# Patient Record
Sex: FEMALE | Race: WHITE | NOT HISPANIC OR LATINO | Employment: FULL TIME | URBAN - NONMETROPOLITAN AREA
[De-identification: names, ages, dates, MRNs, and addresses within clinical notes are randomized per-mention and may not be internally consistent; named-entity substitution may affect disease eponyms.]

---

## 2017-03-06 ENCOUNTER — HOSPITAL ENCOUNTER (EMERGENCY)
Facility: HOSPITAL | Age: 37
Discharge: HOME | End: 2017-03-06
Attending: NURSE PRACTITIONER | Admitting: NURSE PRACTITIONER
Payer: COMMERCIAL

## 2017-03-06 VITALS
DIASTOLIC BLOOD PRESSURE: 105 MMHG | TEMPERATURE: 97 F | HEART RATE: 95 BPM | OXYGEN SATURATION: 98 % | SYSTOLIC BLOOD PRESSURE: 152 MMHG | RESPIRATION RATE: 20 BRPM

## 2017-03-06 DIAGNOSIS — J01.10 ACUTE NON-RECURRENT FRONTAL SINUSITIS: ICD-10-CM

## 2017-03-06 LAB
DEPRECATED S PYO AG THROAT QL EIA: NORMAL
FLUAV+FLUBV AG SPEC QL: NEGATIVE
FLUAV+FLUBV AG SPEC QL: NORMAL
MICRO REPORT STATUS: NORMAL
SPECIMEN SOURCE: NORMAL
SPECIMEN SOURCE: NORMAL

## 2017-03-06 PROCEDURE — 87804 INFLUENZA ASSAY W/OPTIC: CPT | Mod: 59 | Performed by: NURSE PRACTITIONER

## 2017-03-06 PROCEDURE — 87880 STREP A ASSAY W/OPTIC: CPT | Performed by: FAMILY MEDICINE

## 2017-03-06 PROCEDURE — 87081 CULTURE SCREEN ONLY: CPT | Performed by: FAMILY MEDICINE

## 2017-03-06 PROCEDURE — 99213 OFFICE O/P EST LOW 20 MIN: CPT | Performed by: NURSE PRACTITIONER

## 2017-03-06 PROCEDURE — 99213 OFFICE O/P EST LOW 20 MIN: CPT

## 2017-03-06 ASSESSMENT — ENCOUNTER SYMPTOMS
VOMITING: 0
RHINORRHEA: 1
TROUBLE SWALLOWING: 0
PSYCHIATRIC NEGATIVE: 1
WHEEZING: 0
SORE THROAT: 1
APPETITE CHANGE: 0
FEVER: 1
CHILLS: 1
NAUSEA: 0
SHORTNESS OF BREATH: 0
COUGH: 1
ACTIVITY CHANGE: 0
STRIDOR: 0
DYSURIA: 0
HEADACHES: 1
SINUS PRESSURE: 1

## 2017-03-06 NOTE — ED AVS SNAPSHOT
[facility]    [address]    [facility] [address]    Phone:  [phone]                                       Elisha Nunez   MRN: [id number]    Department:  [facility]   Date of Visit:  3/6/2017           Patient Information     Date Of Birth          1980        Your diagnoses for this visit were:     Acute non-recurrent frontal sinusitis        You were seen by Nilsa Rodriguez NP.      Follow-up Information     Follow up with Julia Beltran.    Why:  As needed, If symptoms worsen    Contact information:    [facility]  [address] [place] [address] [phone]          Follow up with [facility].    Specialty:  EMERGENCY MEDICINE    Why:  As needed, If symptoms worsen    Contact information:    [address]  [facility] [address] [phone]    Additional information:    From [facility] ([place], [place]): Take [address]. Turn left at [address]. Turn left at the first stoplight on [address]. At the first stop sign, take a right onto [address]. Take a left into the parking lot and continue through until you reach the North enterance of the building.       From [place]: Take [address]. Take the [address] ramp towards [place]. Turn left onto [address]. Take a slight right onto [address]. Turn left at the first stoplight on [address]. At the first stop sign, take a right onto [address]. Take a left into the parking lot and continue through until you reach the North enterance of the building.       From [place]: Take [address]. Take a right at [address]. At the first stop sign, take a right onto [address]. Take a left into the parking lot and continue through until you reach the North enterance of the building.         Discharge Instructions       Take antibiotics as directed.   Eat a yogurt a day while taking antibiotics.   Take Zyrtec  daily.   Use Flonase 1 spray to each nostril daily for 10 days.   Increase fluid intake.   Sleep with extra pillows under your head or sleep in a recliner until symptoms resolve.   Follow up with PCP with any increase in symptoms or concerns.   Return to urgent care or emergency department with any increase in symptoms or concerns.     Discharge References/Attachments     SINUSITIS (ANTIBIOTIC TREATMENT) (ENGLISH)         Review of your medicines      START taking        Dose / Directions Last dose taken    amoxicillin-clavulanate 875-125 MG per tablet   Commonly known as:  AUGMENTIN   Dose:  1 tablet   Quantity:  20 tablet        Take 1 tablet by mouth 2 times daily for 10 days   Refills:  0          Our records show that you are taking the medicines listed below. If these are incorrect, please call your family doctor or clinic.        Dose / Directions Last dose taken    CITALOPRAM HYDROBROMIDE PO   Dose:  40 mg        Take 40 mg by mouth daily   Refills:  0        etonogestrel-ethinyl estradiol 0.12-0.015 MG/24HR vaginal ring   Commonly known as:  NUVARING   Dose:  1 each        Place 1 each vaginally every 28 days Place for 3 weeks and then remove for one week   Refills:  0        HYDROXYZINE HCL PO   Dose:  25 mg        Take 25 mg by mouth daily as needed for itching   Refills:  0        oxyCODONE-acetaminophen 5-325 MG per tablet   Commonly known as:  PERCOCET   Dose:  1-2 tablet   Quantity:  15 tablet        Take 1-2 tablets by mouth every 4 hours as needed for pain   Refills:  0        TYLENOL PO   Dose:  650 mg        Take 650 mg by mouth every 8 hours as needed for mild pain or fever   Refills:  0                Prescriptions were sent or printed at these locations (1 Prescription)                   [facility] [id number]  [place] - [address] AT [facility] [address] [place] [address]    Telephone:  [phone]   Fax:  [phone]   Hours:                  E-Prescribed  "(1 of 1)         amoxicillin-clavulanate (AUGMENTIN) 875-125 MG per tablet                Procedures and tests performed during your visit     Beta strep group A culture    Influenza A/B antigen    Rapid strep screen      Orders Needing Specimen Collection     None      Pending Results     Date and Time Order Name Status Description    3/6/2017 1153 Beta strep group A culture In process             Pending Culture Results     Date and Time Order Name Status Description    3/6/2017 1153 Beta strep group A culture In process             Thank you for choosing [facility]       Thank you for choosing [facility] for your care. Our goal is always to provide you with excellent care. Hearing back from our patients is one way we can continue to improve our services. Please take a few minutes to complete the written survey that you may receive in the mail after you visit with us. Thank you!        [facility] Information     [employer] lets you send messages to your doctor, view your test results, renew your prescriptions, schedule appointments and more. To sign up, go to [email or web address] . Click on \"Log in\" on the left side of the screen, which will take you to the Welcome page. Then click on \"Sign up Now\" on the right side of the page.     You will be asked to enter the access code listed below, as well as some personal information. Please follow the directions to create your username and password.     Your access code is: [email or web address]  Expires: 2017 12:57 PM     Your access code will  in 90 days. If you need help or a new code, please call your [facility] clinic or [phone].        Care EveryWhere ID     This is your Care EveryWhere ID. This could be used by other organizations to access your [facility] medical records  [id number]        After Visit Summary       This is your record. Keep this with you and show to your community pharmacist(s) and doctor(s) at your next visit.                  "

## 2017-03-06 NOTE — ED PROVIDER NOTES
History     Chief Complaint   Patient presents with     Pharyngitis     today     The history is provided by the patient. No  was used.     Elisha Nunez is a 36 year old female who presents with a headache, sinus pressure, and sore throat for the past 6 days. She has taken [place] cold with mild effectiveness. Positive for fever, chills, and night sweats. Eating and drinking well. Bowel and bladder are working well.     I have reviewed the Medications, Allergies, Past Medical and Surgical History, and Social History in the Epic system.    Review of Systems   Constitutional: Positive for chills and fever. Negative for activity change and appetite change.   HENT: Positive for congestion, postnasal drip, rhinorrhea, sinus pressure and sore throat. Negative for ear discharge, ear pain and trouble swallowing.    Respiratory: Positive for cough. Negative for shortness of breath, wheezing and stridor.    Gastrointestinal: Negative for nausea and vomiting.   Genitourinary: Negative for dysuria.   Skin: Negative for rash.   Neurological: Positive for headaches.        Frontal sinus headache. Headache feels like a throb and pressure.    Psychiatric/Behavioral: Negative.        Physical Exam   BP: (!) 152/105  Pulse: 95  Temp: 97  F (36.1  C)  Resp: 20  SpO2: 98 %  Physical Exam   Constitutional: She is oriented to person, place, and time. She appears well-developed and well-nourished. No distress.   HENT:   Right Ear: External ear normal.   Left Ear: External ear normal.   Mouth/Throat: Oropharynx is clear and moist. No oropharyngeal exudate.   Frontal sinus tenderness and fullness on palpation.    Neck: Normal range of motion. Neck supple.   Cardiovascular: Normal rate, regular rhythm and normal heart sounds.    Pulmonary/Chest: Effort normal. No respiratory distress. She has no wheezes. She has no rales.   Abdominal: Soft. She exhibits no distension.   Musculoskeletal: Normal range of motion.    Lymphadenopathy:     She has no cervical adenopathy.   Neurological: She is alert and oriented to person, place, and time.   Skin: Skin is warm and dry. No rash noted. She is not diaphoretic.   Psychiatric: She has a normal mood and affect. Her behavior is normal.   Nursing note and vitals reviewed.      ED Course     ED Course     Procedures    Labs Ordered and Resulted from Time of ED Arrival Up to the Time of Departure from the ED   RAPID STREP SCREEN   BETA STREP GROUP A CULTURE   INFLUENZA A/B ANTIGEN     Results for orders placed or performed during the hospital encounter of 03/06/17   Rapid strep screen   Result Value Ref Range    Specimen Description Throat     Rapid Strep A Screen       NEGATIVE: No Group A streptococcal antigen detected by immunoassay, await   culture report.      Micro Report Status FINAL 03/06/2017    Influenza A/B antigen   Result Value Ref Range    Influenza A/B Agn Specimen Nares     Influenza A Negative NEG    Influenza B  NEG     Negative   Test results must be correlated with clinical data. If necessary, results   should be confirmed by a molecular assay or viral culture.         Assessments & Plan (with Medical Decision Making)     Discussed plan of care. She verbalized understanding. All questions answered.     I have reviewed the nursing notes.    I have reviewed the findings, diagnosis, plan and need for follow up with the patient.  Discharged in stable condition.     New Prescriptions    AMOXICILLIN-CLAVULANATE (AUGMENTIN) 875-125 MG PER TABLET    Take 1 tablet by mouth 2 times daily for 10 days       Final diagnoses:   Acute non-recurrent frontal sinusitis     Take antibiotics as directed.   Eat a yogurt a day while taking antibiotics.   Take Zyrtec daily.   Use Flonase 1 spray to each nostril daily for 10 days.   Increase fluid intake.   Sleep with extra pillows under your head or sleep in a recliner until symptoms resolve.   Follow up with PCP with any increase in  symptoms or concerns.   Return to urgent care or emergency department with any increase in symptoms or concerns.     PRAVEZ Carney  3/6/2017  12:12 PM  [facility]       Nilsa Rodriguez NP  03/07/17 8839

## 2017-03-06 NOTE — DISCHARGE INSTRUCTIONS
Take antibiotics as directed.   Eat a yogurt a day while taking antibiotics.   Take Zyrtec daily.   Use Flonase 1 spray to each nostril daily for 10 days.   Increase fluid intake.   Sleep with extra pillows under your head or sleep in a recliner until symptoms resolve.   Follow up with PCP with any increase in symptoms or concerns.   Return to urgent care or emergency department with any increase in symptoms or concerns.

## 2017-03-06 NOTE — ED NOTES
Pt presents today alone for c/o sore throat and exposure to influenza, she has not been feeling good since Wednesday or Thursday of last week.

## 2017-03-06 NOTE — ED AVS SNAPSHOT
[facility]    [address] [address]    Phone:  [phone]                                       Elisha Nunez   MRN: [id number]    Department:  [facility]   Date of Visit:  3/6/2017           After Visit Summary Signature Page     I have received my discharge instructions, and my questions have been answered. I have discussed any challenges I see with this plan with the nurse or doctor.    ..........................................................................................................................................  Patient/Patient Representative Signature      ..........................................................................................................................................  Patient Representative Print Name and Relationship to Patient    ..................................................               ................................................  Date                                            Time    ..........................................................................................................................................  Reviewed by Signature/Title    ...................................................              ..............................................  Date                                                            Time

## 2017-03-08 LAB
BACTERIA SPEC CULT: NORMAL
MICRO REPORT STATUS: NORMAL
SPECIMEN SOURCE: NORMAL

## 2017-08-22 ENCOUNTER — TRANSFERRED RECORDS (OUTPATIENT)
Dept: HEALTH INFORMATION MANAGEMENT | Facility: CLINIC | Age: 37
End: 2017-08-22

## 2017-08-24 ENCOUNTER — HOSPITAL ENCOUNTER (OUTPATIENT)
Dept: CT IMAGING | Facility: CLINIC | Age: 37
Discharge: HOME | End: 2017-08-24
Attending: OTOLARYNGOLOGY | Admitting: OTOLARYNGOLOGY
Payer: COMMERCIAL

## 2017-08-24 ENCOUNTER — HOSPITAL ENCOUNTER (OUTPATIENT)
Dept: GENERAL RADIOLOGY | Facility: CLINIC | Age: 37
End: 2017-08-24
Attending: OTOLARYNGOLOGY
Payer: COMMERCIAL

## 2017-08-24 DIAGNOSIS — H91.90 HEARING LOSS, UNSPECIFIED HEARING LOSS TYPE, UNSPECIFIED LATERALITY: ICD-10-CM

## 2017-08-24 DIAGNOSIS — H91.90 HEARING LOSS: ICD-10-CM

## 2017-08-24 PROCEDURE — 70120 X-RAY EXAM OF MASTOIDS: CPT

## 2017-08-24 PROCEDURE — 70480 CT ORBIT/EAR/FOSSA W/O DYE: CPT

## 2017-10-11 ENCOUNTER — ANESTHESIA EVENT (OUTPATIENT)
Dept: SURGERY | Facility: CLINIC | Age: 37
End: 2017-10-11
Payer: COMMERCIAL

## 2017-10-12 ENCOUNTER — ANESTHESIA (OUTPATIENT)
Dept: SURGERY | Facility: CLINIC | Age: 37
End: 2017-10-12
Payer: COMMERCIAL

## 2017-10-12 ENCOUNTER — HOSPITAL ENCOUNTER (OUTPATIENT)
Facility: CLINIC | Age: 37
Discharge: HOME | End: 2017-10-12
Attending: OTOLARYNGOLOGY | Admitting: OTOLARYNGOLOGY
Payer: COMMERCIAL

## 2017-10-12 VITALS
HEART RATE: 105 BPM | TEMPERATURE: 98.1 F | DIASTOLIC BLOOD PRESSURE: 77 MMHG | RESPIRATION RATE: 16 BRPM | BODY MASS INDEX: 50.59 KG/M2 | OXYGEN SATURATION: 95 % | WEIGHT: 285.5 LBS | SYSTOLIC BLOOD PRESSURE: 139 MMHG | HEIGHT: 63 IN

## 2017-10-12 DIAGNOSIS — H66.3X1 CHRONIC SUPPURATIVE OTITIS MEDIA OF RIGHT EAR, UNSPECIFIED OTITIS MEDIA LOCATION: Primary | Chronic | ICD-10-CM

## 2017-10-12 LAB
GLUCOSE BLDC GLUCOMTR-MCNC: 135 MG/DL (ref 70–99)
HCG UR QL: NEGATIVE

## 2017-10-12 PROCEDURE — 71000015 ZZH RECOVERY PHASE 1 LEVEL 2 EA ADDTL HR: Performed by: OTOLARYNGOLOGY

## 2017-10-12 PROCEDURE — 27210794 ZZH OR GENERAL SUPPLY STERILE: Performed by: OTOLARYNGOLOGY

## 2017-10-12 PROCEDURE — 25000128 H RX IP 250 OP 636: Performed by: ANESTHESIOLOGY

## 2017-10-12 PROCEDURE — 36000062 ZZH SURGERY LEVEL 4 1ST 30 MIN - UMMC: Performed by: OTOLARYNGOLOGY

## 2017-10-12 PROCEDURE — 25000132 ZZH RX MED GY IP 250 OP 250 PS 637: Performed by: NURSE ANESTHETIST, CERTIFIED REGISTERED

## 2017-10-12 PROCEDURE — 25000125 ZZHC RX 250: Performed by: NURSE ANESTHETIST, CERTIFIED REGISTERED

## 2017-10-12 PROCEDURE — 25000128 H RX IP 250 OP 636: Performed by: NURSE ANESTHETIST, CERTIFIED REGISTERED

## 2017-10-12 PROCEDURE — 71000014 ZZH RECOVERY PHASE 1 LEVEL 2 FIRST HR: Performed by: OTOLARYNGOLOGY

## 2017-10-12 PROCEDURE — 25000132 ZZH RX MED GY IP 250 OP 250 PS 637: Performed by: OTOLARYNGOLOGY

## 2017-10-12 PROCEDURE — 40000170 ZZH STATISTIC PRE-PROCEDURE ASSESSMENT II: Performed by: OTOLARYNGOLOGY

## 2017-10-12 PROCEDURE — 25000566 ZZH SEVOFLURANE, EA 15 MIN: Performed by: OTOLARYNGOLOGY

## 2017-10-12 PROCEDURE — 25000128 H RX IP 250 OP 636: Performed by: OTOLARYNGOLOGY

## 2017-10-12 PROCEDURE — 82962 GLUCOSE BLOOD TEST: CPT

## 2017-10-12 PROCEDURE — C9399 UNCLASSIFIED DRUGS OR BIOLOG: HCPCS | Performed by: NURSE ANESTHETIST, CERTIFIED REGISTERED

## 2017-10-12 PROCEDURE — 37000009 ZZH ANESTHESIA TECHNICAL FEE, EACH ADDTL 15 MIN: Performed by: OTOLARYNGOLOGY

## 2017-10-12 PROCEDURE — 71000027 ZZH RECOVERY PHASE 2 EACH 15 MINS: Performed by: OTOLARYNGOLOGY

## 2017-10-12 PROCEDURE — 37000008 ZZH ANESTHESIA TECHNICAL FEE, 1ST 30 MIN: Performed by: OTOLARYNGOLOGY

## 2017-10-12 PROCEDURE — 81025 URINE PREGNANCY TEST: CPT | Performed by: ANESTHESIOLOGY

## 2017-10-12 PROCEDURE — 25000125 ZZHC RX 250: Performed by: ANESTHESIOLOGY

## 2017-10-12 PROCEDURE — 27210995 ZZH RX 272: Performed by: OTOLARYNGOLOGY

## 2017-10-12 PROCEDURE — 25000125 ZZHC RX 250: Performed by: OTOLARYNGOLOGY

## 2017-10-12 PROCEDURE — 36000064 ZZH SURGERY LEVEL 4 EA 15 ADDTL MIN - UMMC: Performed by: OTOLARYNGOLOGY

## 2017-10-12 DEVICE — SHEET SILICONE 38MMX51MMX0.13MM  20-10685: Type: IMPLANTABLE DEVICE | Site: EAR | Status: FUNCTIONAL

## 2017-10-12 RX ORDER — NALOXONE HYDROCHLORIDE 0.4 MG/ML
.1-.4 INJECTION, SOLUTION INTRAMUSCULAR; INTRAVENOUS; SUBCUTANEOUS
Status: DISCONTINUED | OUTPATIENT
Start: 2017-10-12 | End: 2017-10-12 | Stop reason: HOSPADM

## 2017-10-12 RX ORDER — OXYCODONE HYDROCHLORIDE 5 MG/1
5-10 TABLET ORAL EVERY 4 HOURS PRN
Qty: 70 TABLET | Refills: 0 | Status: ON HOLD | OUTPATIENT
Start: 2017-10-12 | End: 2017-10-23

## 2017-10-12 RX ORDER — FENTANYL CITRATE 50 UG/ML
25-50 INJECTION, SOLUTION INTRAMUSCULAR; INTRAVENOUS
Status: DISCONTINUED | OUTPATIENT
Start: 2017-10-12 | End: 2017-10-12 | Stop reason: HOSPADM

## 2017-10-12 RX ORDER — SODIUM CHLORIDE, SODIUM LACTATE, POTASSIUM CHLORIDE, AND CALCIUM CHLORIDE .6; .31; .03; .02 G/100ML; G/100ML; G/100ML; G/100ML
IRRIGANT IRRIGATION PRN
Status: DISCONTINUED | OUTPATIENT
Start: 2017-10-12 | End: 2017-10-12 | Stop reason: HOSPADM

## 2017-10-12 RX ORDER — ONDANSETRON 4 MG/1
4 TABLET, ORALLY DISINTEGRATING ORAL EVERY 30 MIN PRN
Status: DISCONTINUED | OUTPATIENT
Start: 2017-10-12 | End: 2017-10-12 | Stop reason: HOSPADM

## 2017-10-12 RX ORDER — SULFACETAMIDE SODIUM AND PREDNISOLONE SODIUM PHOSPHATE 100; 2.3 MG/ML; MG/ML
SOLUTION/ DROPS OPHTHALMIC PRN
Status: DISCONTINUED | OUTPATIENT
Start: 2017-10-12 | End: 2017-10-12 | Stop reason: HOSPADM

## 2017-10-12 RX ORDER — SCOLOPAMINE TRANSDERMAL SYSTEM 1 MG/1
1 PATCH, EXTENDED RELEASE TRANSDERMAL ONCE
Status: COMPLETED | OUTPATIENT
Start: 2017-10-12 | End: 2017-10-12

## 2017-10-12 RX ORDER — GLYCOPYRROLATE 0.2 MG/ML
INJECTION, SOLUTION INTRAMUSCULAR; INTRAVENOUS PRN
Status: DISCONTINUED | OUTPATIENT
Start: 2017-10-12 | End: 2017-10-12

## 2017-10-12 RX ORDER — ONDANSETRON 2 MG/ML
INJECTION INTRAMUSCULAR; INTRAVENOUS PRN
Status: DISCONTINUED | OUTPATIENT
Start: 2017-10-12 | End: 2017-10-12

## 2017-10-12 RX ORDER — AMOXICILLIN 875 MG
875 TABLET ORAL 2 TIMES DAILY
Qty: 20 TABLET | Refills: 0 | Status: ON HOLD | OUTPATIENT
Start: 2017-10-12 | End: 2017-10-23

## 2017-10-12 RX ORDER — CEFAZOLIN SODIUM 2 G/100ML
2 INJECTION, SOLUTION INTRAVENOUS
Status: COMPLETED | OUTPATIENT
Start: 2017-10-12 | End: 2017-10-12

## 2017-10-12 RX ORDER — ALBUTEROL SULFATE 90 UG/1
AEROSOL, METERED RESPIRATORY (INHALATION) PRN
Status: DISCONTINUED | OUTPATIENT
Start: 2017-10-12 | End: 2017-10-12

## 2017-10-12 RX ORDER — BACITRACIN ZINC 500 [USP'U]/G
OINTMENT TOPICAL PRN
Status: DISCONTINUED | OUTPATIENT
Start: 2017-10-12 | End: 2017-10-12 | Stop reason: HOSPADM

## 2017-10-12 RX ORDER — LIDOCAINE 40 MG/G
CREAM TOPICAL
Status: DISCONTINUED | OUTPATIENT
Start: 2017-10-12 | End: 2017-10-12 | Stop reason: HOSPADM

## 2017-10-12 RX ORDER — CEFAZOLIN SODIUM 1 G/50ML
3 SOLUTION INTRAVENOUS
Status: DISCONTINUED | OUTPATIENT
Start: 2017-10-12 | End: 2017-10-12 | Stop reason: HOSPADM

## 2017-10-12 RX ORDER — DEXAMETHASONE SODIUM PHOSPHATE 4 MG/ML
INJECTION, SOLUTION INTRA-ARTICULAR; INTRALESIONAL; INTRAMUSCULAR; INTRAVENOUS; SOFT TISSUE PRN
Status: DISCONTINUED | OUTPATIENT
Start: 2017-10-12 | End: 2017-10-12

## 2017-10-12 RX ORDER — ONDANSETRON 2 MG/ML
4 INJECTION INTRAMUSCULAR; INTRAVENOUS EVERY 30 MIN PRN
Status: DISCONTINUED | OUTPATIENT
Start: 2017-10-12 | End: 2017-10-12 | Stop reason: HOSPADM

## 2017-10-12 RX ORDER — ALBUTEROL SULFATE 0.83 MG/ML
2.5 SOLUTION RESPIRATORY (INHALATION) EVERY 4 HOURS PRN
Status: DISCONTINUED | OUTPATIENT
Start: 2017-10-12 | End: 2017-10-12 | Stop reason: HOSPADM

## 2017-10-12 RX ORDER — KETOROLAC TROMETHAMINE 30 MG/ML
INJECTION, SOLUTION INTRAMUSCULAR; INTRAVENOUS PRN
Status: DISCONTINUED | OUTPATIENT
Start: 2017-10-12 | End: 2017-10-12

## 2017-10-12 RX ORDER — MEPERIDINE HYDROCHLORIDE 25 MG/ML
12.5 INJECTION INTRAMUSCULAR; INTRAVENOUS; SUBCUTANEOUS
Status: DISCONTINUED | OUTPATIENT
Start: 2017-10-12 | End: 2017-10-12 | Stop reason: HOSPADM

## 2017-10-12 RX ORDER — HYDRALAZINE HYDROCHLORIDE 20 MG/ML
2.5-5 INJECTION INTRAMUSCULAR; INTRAVENOUS EVERY 10 MIN PRN
Status: DISCONTINUED | OUTPATIENT
Start: 2017-10-12 | End: 2017-10-12 | Stop reason: HOSPADM

## 2017-10-12 RX ORDER — EPHEDRINE SULFATE 50 MG/ML
INJECTION, SOLUTION INTRAMUSCULAR; INTRAVENOUS; SUBCUTANEOUS PRN
Status: DISCONTINUED | OUTPATIENT
Start: 2017-10-12 | End: 2017-10-12

## 2017-10-12 RX ORDER — EPINEPHRINE NASAL SOLUTION 1 MG/ML
SOLUTION NASAL PRN
Status: DISCONTINUED | OUTPATIENT
Start: 2017-10-12 | End: 2017-10-12 | Stop reason: HOSPADM

## 2017-10-12 RX ORDER — LIDOCAINE HYDROCHLORIDE 20 MG/ML
INJECTION, SOLUTION INFILTRATION; PERINEURAL PRN
Status: DISCONTINUED | OUTPATIENT
Start: 2017-10-12 | End: 2017-10-12

## 2017-10-12 RX ORDER — AMOXICILLIN 250 MG
1-2 CAPSULE ORAL 2 TIMES DAILY
Qty: 30 TABLET | Refills: 0 | Status: SHIPPED | OUTPATIENT
Start: 2017-10-12 | End: 2018-09-14

## 2017-10-12 RX ORDER — FENTANYL CITRATE 50 UG/ML
INJECTION, SOLUTION INTRAMUSCULAR; INTRAVENOUS PRN
Status: DISCONTINUED | OUTPATIENT
Start: 2017-10-12 | End: 2017-10-12

## 2017-10-12 RX ORDER — BUPIVACAINE HYDROCHLORIDE AND EPINEPHRINE 5; 5 MG/ML; UG/ML
INJECTION, SOLUTION PERINEURAL PRN
Status: DISCONTINUED | OUTPATIENT
Start: 2017-10-12 | End: 2017-10-12 | Stop reason: HOSPADM

## 2017-10-12 RX ORDER — CEFAZOLIN SODIUM 1 G/3ML
1 INJECTION, POWDER, FOR SOLUTION INTRAMUSCULAR; INTRAVENOUS SEE ADMIN INSTRUCTIONS
Status: DISCONTINUED | OUTPATIENT
Start: 2017-10-12 | End: 2017-10-12 | Stop reason: HOSPADM

## 2017-10-12 RX ORDER — SODIUM CHLORIDE, SODIUM LACTATE, POTASSIUM CHLORIDE, CALCIUM CHLORIDE 600; 310; 30; 20 MG/100ML; MG/100ML; MG/100ML; MG/100ML
INJECTION, SOLUTION INTRAVENOUS CONTINUOUS
Status: DISCONTINUED | OUTPATIENT
Start: 2017-10-12 | End: 2017-10-12 | Stop reason: HOSPADM

## 2017-10-12 RX ORDER — LABETALOL HYDROCHLORIDE 5 MG/ML
10 INJECTION, SOLUTION INTRAVENOUS
Status: DISCONTINUED | OUTPATIENT
Start: 2017-10-12 | End: 2017-10-12 | Stop reason: HOSPADM

## 2017-10-12 RX ORDER — PROPOFOL 10 MG/ML
INJECTION, EMULSION INTRAVENOUS PRN
Status: DISCONTINUED | OUTPATIENT
Start: 2017-10-12 | End: 2017-10-12

## 2017-10-12 RX ADMIN — HYDROMORPHONE HYDROCHLORIDE 0.3 MG: 1 INJECTION, SOLUTION INTRAMUSCULAR; INTRAVENOUS; SUBCUTANEOUS at 12:24

## 2017-10-12 RX ADMIN — Medication: at 14:39

## 2017-10-12 RX ADMIN — HYDROMORPHONE HYDROCHLORIDE 0.25 MG: 1 INJECTION, SOLUTION INTRAMUSCULAR; INTRAVENOUS; SUBCUTANEOUS at 09:44

## 2017-10-12 RX ADMIN — FENTANYL CITRATE 25 MCG: 50 INJECTION, SOLUTION INTRAMUSCULAR; INTRAVENOUS at 12:11

## 2017-10-12 RX ADMIN — Medication: at 13:13

## 2017-10-12 RX ADMIN — PHENYLEPHRINE HYDROCHLORIDE 100 MCG: 10 INJECTION, SOLUTION INTRAMUSCULAR; INTRAVENOUS; SUBCUTANEOUS at 08:53

## 2017-10-12 RX ADMIN — HYDROMORPHONE HYDROCHLORIDE 0.25 MG: 1 INJECTION, SOLUTION INTRAMUSCULAR; INTRAVENOUS; SUBCUTANEOUS at 11:27

## 2017-10-12 RX ADMIN — PHENYLEPHRINE HYDROCHLORIDE 200 MCG: 10 INJECTION, SOLUTION INTRAMUSCULAR; INTRAVENOUS; SUBCUTANEOUS at 09:02

## 2017-10-12 RX ADMIN — Medication 10 MG: at 09:48

## 2017-10-12 RX ADMIN — PHENYLEPHRINE HYDROCHLORIDE 100 MCG: 10 INJECTION, SOLUTION INTRAMUSCULAR; INTRAVENOUS; SUBCUTANEOUS at 09:15

## 2017-10-12 RX ADMIN — DEXAMETHASONE SODIUM PHOSPHATE 10 MG: 4 INJECTION, SOLUTION INTRAMUSCULAR; INTRAVENOUS at 09:06

## 2017-10-12 RX ADMIN — PHENYLEPHRINE HYDROCHLORIDE 100 MCG: 10 INJECTION, SOLUTION INTRAMUSCULAR; INTRAVENOUS; SUBCUTANEOUS at 09:08

## 2017-10-12 RX ADMIN — CEFAZOLIN SODIUM 2 G: 2 INJECTION, SOLUTION INTRAVENOUS at 09:09

## 2017-10-12 RX ADMIN — PHENYLEPHRINE HYDROCHLORIDE 100 MCG: 10 INJECTION, SOLUTION INTRAMUSCULAR; INTRAVENOUS; SUBCUTANEOUS at 08:50

## 2017-10-12 RX ADMIN — KETOROLAC TROMETHAMINE 30 MG: 30 INJECTION, SOLUTION INTRAMUSCULAR at 11:25

## 2017-10-12 RX ADMIN — HYDROMORPHONE HYDROCHLORIDE 0.3 MG: 1 INJECTION, SOLUTION INTRAMUSCULAR; INTRAVENOUS; SUBCUTANEOUS at 12:46

## 2017-10-12 RX ADMIN — Medication 100 MG: at 08:36

## 2017-10-12 RX ADMIN — Medication 10 MG: at 09:15

## 2017-10-12 RX ADMIN — HYDROMORPHONE HYDROCHLORIDE 0.25 MG: 1 INJECTION, SOLUTION INTRAMUSCULAR; INTRAVENOUS; SUBCUTANEOUS at 09:47

## 2017-10-12 RX ADMIN — PHENYLEPHRINE HYDROCHLORIDE 100 MCG: 10 INJECTION, SOLUTION INTRAMUSCULAR; INTRAVENOUS; SUBCUTANEOUS at 08:56

## 2017-10-12 RX ADMIN — MIDAZOLAM HYDROCHLORIDE 2 MG: 1 INJECTION, SOLUTION INTRAMUSCULAR; INTRAVENOUS at 08:27

## 2017-10-12 RX ADMIN — ALBUTEROL SULFATE 8 PUFF: 90 AEROSOL, METERED RESPIRATORY (INHALATION) at 08:47

## 2017-10-12 RX ADMIN — PROPOFOL 50 MG: 10 INJECTION, EMULSION INTRAVENOUS at 08:47

## 2017-10-12 RX ADMIN — Medication 5 MG: at 08:56

## 2017-10-12 RX ADMIN — FENTANYL CITRATE 50 MCG: 50 INJECTION, SOLUTION INTRAMUSCULAR; INTRAVENOUS at 09:19

## 2017-10-12 RX ADMIN — Medication 5 MG: at 09:02

## 2017-10-12 RX ADMIN — SODIUM CHLORIDE, POTASSIUM CHLORIDE, SODIUM LACTATE AND CALCIUM CHLORIDE: 600; 310; 30; 20 INJECTION, SOLUTION INTRAVENOUS at 08:30

## 2017-10-12 RX ADMIN — PROPOFOL 200 MG: 10 INJECTION, EMULSION INTRAVENOUS at 08:36

## 2017-10-12 RX ADMIN — FENTANYL CITRATE 50 MCG: 50 INJECTION, SOLUTION INTRAMUSCULAR; INTRAVENOUS at 12:16

## 2017-10-12 RX ADMIN — HYDROMORPHONE HYDROCHLORIDE 0.3 MG: 1 INJECTION, SOLUTION INTRAMUSCULAR; INTRAVENOUS; SUBCUTANEOUS at 12:57

## 2017-10-12 RX ADMIN — HYDROMORPHONE HYDROCHLORIDE 0.2 MG: 1 INJECTION, SOLUTION INTRAMUSCULAR; INTRAVENOUS; SUBCUTANEOUS at 13:23

## 2017-10-12 RX ADMIN — PHENYLEPHRINE HYDROCHLORIDE 200 MCG: 10 INJECTION, SOLUTION INTRAMUSCULAR; INTRAVENOUS; SUBCUTANEOUS at 09:11

## 2017-10-12 RX ADMIN — FENTANYL CITRATE 25 MCG: 50 INJECTION, SOLUTION INTRAMUSCULAR; INTRAVENOUS at 12:23

## 2017-10-12 RX ADMIN — ALBUTEROL SULFATE 6 PUFF: 90 AEROSOL, METERED RESPIRATORY (INHALATION) at 11:54

## 2017-10-12 RX ADMIN — Medication 0.2 MG: at 08:53

## 2017-10-12 RX ADMIN — HYDROMORPHONE HYDROCHLORIDE 0.4 MG: 1 INJECTION, SOLUTION INTRAMUSCULAR; INTRAVENOUS; SUBCUTANEOUS at 12:33

## 2017-10-12 RX ADMIN — SCOLOPAMINE TRANSDERMAL SYSTEM 1 PATCH: 1 PATCH, EXTENDED RELEASE TRANSDERMAL at 07:58

## 2017-10-12 RX ADMIN — SUGAMMADEX 160 MG: 100 INJECTION, SOLUTION INTRAVENOUS at 11:34

## 2017-10-12 RX ADMIN — Medication 5 MG: at 08:53

## 2017-10-12 RX ADMIN — Medication 20 MG: at 08:56

## 2017-10-12 RX ADMIN — PROPOFOL 50 MG: 10 INJECTION, EMULSION INTRAVENOUS at 08:50

## 2017-10-12 RX ADMIN — Medication 5 MG: at 09:16

## 2017-10-12 RX ADMIN — ONDANSETRON 4 MG: 2 INJECTION INTRAMUSCULAR; INTRAVENOUS at 12:38

## 2017-10-12 RX ADMIN — PHENYLEPHRINE HYDROCHLORIDE 0.4 MCG/KG/MIN: 10 INJECTION, SOLUTION INTRAMUSCULAR; INTRAVENOUS; SUBCUTANEOUS at 09:11

## 2017-10-12 RX ADMIN — SODIUM CHLORIDE, POTASSIUM CHLORIDE, SODIUM LACTATE AND CALCIUM CHLORIDE: 600; 310; 30; 20 INJECTION, SOLUTION INTRAVENOUS at 11:26

## 2017-10-12 RX ADMIN — CEFAZOLIN SODIUM 1 G: 2 INJECTION, SOLUTION INTRAVENOUS at 11:09

## 2017-10-12 RX ADMIN — LIDOCAINE HYDROCHLORIDE 60 MG: 20 INJECTION, SOLUTION INFILTRATION; PERINEURAL at 08:36

## 2017-10-12 RX ADMIN — ALBUTEROL SULFATE 6 PUFF: 90 AEROSOL, METERED RESPIRATORY (INHALATION) at 11:41

## 2017-10-12 RX ADMIN — ONDANSETRON 4 MG: 2 INJECTION INTRAMUSCULAR; INTRAVENOUS at 11:28

## 2017-10-12 RX ADMIN — FENTANYL CITRATE 150 MCG: 50 INJECTION, SOLUTION INTRAMUSCULAR; INTRAVENOUS at 08:36

## 2017-10-12 NOTE — BRIEF OP NOTE
[facility] Otolaryngology Brief Operative Note    Pre-operative diagnosis: Otitis Media Right Ear    Post-operative diagnosis: Same   Procedure: Right flexible endaural tympanoplasty; canalplasty; meatoplasty; insertion of Paparella #2 tube   Surgeon: Stephan Morgan MD; Stephan Crowley MD   Assistant(s): None   Anesthesia: General endotracheal anesthesia   Estimated blood loss: * No values recorded between 10/12/2017  9:17 AM and 10/12/2017 11:39 AM *   Total IV fluids: (See anesthesia record)   Blood transfusion: No transfusion was given during surgery   Total urine output: (See anesthesia record)   Drains: None   Specimens: None   Implants: None   Findings: 1. 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 Granulation tissue fills middle ear cleft, obstructs eustachian tube, extends to attic region; 3. Ossicular chain intact; 4. Chorda sacrificed for exposure; 5. Tensor lysed; 6. Paparella #2 tube inserted; 7. Silastic on promontory   Complications: None   Condition: Stable   Comments: See dictated operative report for full details

## 2017-10-12 NOTE — IP AVS SNAPSHOT
[facility]    [address] [address]    Phone:  [phone]                                       After Visit Summary   10/12/2017    Elisha Nunez    MRN: [id number]           After Visit Summary Signature Page     I have received my discharge instructions, and my questions have been answered. I have discussed any challenges I see with this plan with the nurse or doctor.    ..........................................................................................................................................  Patient/Patient Representative Signature      ..........................................................................................................................................  Patient Representative Print Name and Relationship to Patient    ..................................................               ................................................  Date                                            Time    ..........................................................................................................................................  Reviewed by Signature/Title    ...................................................              ..............................................  Date                                                            Time

## 2017-10-12 NOTE — OP NOTE
DATE OF VISIT:  10/12/2017.      ATTENDING SURGEON:  Stephan Crowley MD        FELLOW:  Stephan Morgan MD       NAME OF PROCEDURE:   1.  Right flexible endaural tympanoplasty.   2.  Canaloplasty.   3.  Meatoplasty.   4.  Insertion of Paparella #2 tube.      PREOPERATIVE DIAGNOSIS:  Chronic otitis media.      POSTOPERATIVE DIAGNOSIS:  Chronic otitis media.      INDICATIONS:  Elisha Nunez is a 37-year-old woman with history of chronic otitis media of the right ear.  She has undergone medical treatment, has not responded adequately.  The risks, benefits and alternatives of exploratory tympanotomy with treatment of the granulation tissue in the middle ear were discussed and she elects to proceed.      OPERATIVE FINDINGS:  The patient has a stenotic canal on the right with a narrow meatus.  There is granulation tissue that fills the middle ear cleft and obstructs the eustachian tube.  This extends to the attic region and encases the tube.  The ossicular chain was palpated and intact.  The chorda tympani was sacrificed for exposure.  The tensor tympani was lysed.  The Paparella #2 tube was inserted and Silastic was placed on the promontory.  A limited atticotomy was performed.      ANESTHESIA:  General inhalational.      DESCRIPTION OF PROCEDURE:  Informed consent was obtained, the patient was taken to the operative suite.  Surgical site was prepped and draped in the usual standard sterile fashion.  A timeout was conducted per protocol.  Lempert 1 and 2 incisions were conducted.  The skin flaps were then elevated.  Incisions were made down to the tympanic membrane and the retractors were inserted.  The canal was aggressively drilled with a series of cutting burs to widely open the canal.  The burs were used to drill the tympanic bone in the attic region to facilitate view of the  attic region.  The middle ear space was then carefully examined.  Chorda tympani nerve was sacrificed and there was found to be thick  scar tissue as well as granulation within the middle ear cleft.  This was carefully dissected and removed.  The preexisting tube was removed and granulation tissue was removed from the tract.  Granulation tissue was all around the incus and the malleus and the promontory into the eustachian tube.  This was all aggressively debrided.  The drilling continued a little bit further posteriorly.  We did not appreciate significant granular disease heading back towards the antrum of the mastoid and so we left this alone at this present time.  Once this was completed, a Gelfoam was placed over the round window and Silastic sheath was inserted in the middle ear cleft.  The tympanomeatal flap was laid back into position and packing was applied.  The meatus was advanced to widen it and a packing was used to stent it.  She was originally set up for a Thiersch graft and will determine the necessity of this at the next followup visit.  Will see her on Friday to make a decision.         ESTEPHANIA KNOWLES MD       As dictated by ESTEPHANIA MARTINS MD            D: 10/12/2017 12:27   T: 10/12/2017 12:58   MT: PATRICK      Name:     INGRIS GUTIERREZ   MRN:      [id number]        Account:        [id number]   :      1980           Procedure Date: 10/12/2017      Document: [id number]

## 2017-10-12 NOTE — ANESTHESIA POSTPROCEDURE EVALUATION
Patient: Elisha Nunez    Procedure(s):  Right Endural Flexiable Tympanoplasty, Middle Ear Reconstruction,Mastoidectomy, Myringotomy and Tube (#2 Paparella tube), Meatoplasty  - Wound Class: II-Clean Contaminated   - Wound Class: II-Clean Contaminated   - Wound Class: I-Clean    Diagnosis:Otitis Media Right Ear   Diagnosis Additional Information: No value filed.    Anesthesia Type:  General, RSI, ETT    Note:  Anesthesia Post Evaluation    Patient location during evaluation: PACU  Patient participation: Able to fully participate in evaluation  Level of consciousness: awake and alert  Pain management: adequate  Airway patency: patent  Cardiovascular status: acceptable  Respiratory status: acceptable  Hydration status: acceptable  PONV: none     Anesthetic complications: None          Last vitals:  Vitals:    10/12/17 1200 10/12/17 1230 10/12/17 1300   BP: 154/66  130/76   Pulse: 105     Resp: 14  10   Temp: 37.6  C (99.7  F) 36.9  C (98.4  F) 36.8  C (98.2  F)   SpO2:            Electronically Signed By: Xenia Avalos MD  October 12, 2017  1:31 PM

## 2017-10-12 NOTE — IP AVS SNAPSHOT
MRN:[id number]                      After Visit Summary   10/12/2017    Elisha Nunez    MRN: [id number]           Thank you!     Thank you for choosing [facility] for your care. Our goal is always to provide you with excellent care. Hearing back from our patients is one way we can continue to improve our services. Please take a few minutes to complete the written survey that you may receive in the mail after you visit with us. Thank you!        Patient Information     Date Of Birth          1980        About your hospital stay     You were admitted on:  October 12, 2017 You last received care in the:   PACU    You were discharged on:  October 12, 2017       Who to Call     For medical emergencies, please call 911.  For non-urgent questions about your medical care, please call your primary care provider or clinic, [phone]  For questions related to your surgery, please call your surgery clinic        Attending Provider     Provider Specialty    Stephan Crowley MD Otolaryngology       Primary Care Provider Office Phone # Fax #    Julia Beltran [phone]      After Care Instructions     Check with Provider when to start anticoagulants           Diet Instructions       Resume pre procedure diet            Discharge Instructions       Patient to follow up with surgeon in 8 days            Discharge Instructions - Lifting restrictions       Lifting Restrictions 10 pounds until seen at Post-op follow up appointment            No Alcohol       For 24 hours following procedure            No Aspirin, Ibuprofen or Naproxen products       for 7 - 10 days following surgery            No blowing nose       No popping of the ears or airline travel until approved by your doctor.            No driving or operating machinery       until the day after procedure            Notify Physician        If bleeding or dressing becomes loose or dislodged            Wound care       Keep  dressing clean and dry and intact. Ok to remove large dressing the day after surgery. Replace the cotton ball as needed for drainage.                  Your next 10 appointments already scheduled     Oct 23, 2017   Procedure with Stephan Crowley MD   [facility], [place], Same Day Surgery (--)    [address]  [place] [id number] [phone]              Further instructions from your care team            INFORMATION FOR PATIENTS WHO HAVE HAD EAR SURGERY  DO NOT ALLOW WATER OR MOISTURE IN OR AROUND THE EAR CANAL    OR INCISION  DO NOT WASH YOUR HAIR UNTIL AFTER YOUR FIRST VISIT FOLLOWING SURGERY unless your doctor gives you approval. Precautions must be taken to avoid any kind of water or moisture in your ear incision.   DO NOT BLOW YOUR NOSE. Avoid sneezing, if unavoidable, sneeze with your  mouth open.  SLEEP WITH YOUR HEAD PROPPED UP on two pillows for the first few nights after surgery or until the sutures are removed. This will help reduce swelling and promote drainage. Good sleep also promotes rapid healing.  Firm pillows give the best comfort for sleeping. Some find non-rocking, overstuffed chairs provide comfort. As long as your head is above your feet, a comfortable chair can be used for sleeping.  ACTIVITY:     DO NOT LIFT ANYTHING heavier than 5-10 pounds for 1 week.     Then you may increase on a weekly basis. Example: 2nd week - 10-20 pounds; 3rd week - 20-30 pounds; 4th week - usual normal activity. STOP physical exercises such as aerobics, push-ups, sit-ups, etc, depending on the nature of the surgery performed.      Usually normal activity can be resumed after 1 month.   DO NOT STRAIN. If constipation is a problem, use a laxative.   AVOID QUICK HEAD AND BODY MOVEMENTS, THEY CAN CAUSE DIZZINESS. Some imbalance after surgery is normal, however spinning dizziness (vertigo) should be reported.   IF THE EAR IS PACKED, DO NOT REMOVE THE PACKING   CHANGE THE DRESSING OVER THE INCISION at least twice a  day. Make sure you have CLEAN, DRY HANDS when changing the dressing (you will be given supplies including dressings and tape).     Use a cotton-tipped applicator to clean the incision, first with hydrogen peroxide followed by 70% alcohol solution.     Apply a thin layer of Bacitracin ointment twice daily.     Cover with the appropriate dressing. A nurse or physician will advise you on the proper dressing.   LOOK FOR SIGNS OF INFECTION if you have an incision, inspect it daily. Report increased pain, redness, swelling, or drainage to your doctor.   SOME DRAINAGE FROM YOUR EAR IS NORMAL AFTER SURGERY.  You will probably hear unusual sounds until total hearing is complete, generally 4-6 weeks after surgery.    AIRPLANE TRAVEL IS USUALLY RESTRICTED FOR 1 MONTH. Discuss  flying plans with your doctor to discuss measures to protect your ears.    CALL YOUR DOCTOR IF:     Temperature rises to 101 degrees or greater     Ear drainage increases rather than decreases     If ear drainage develops an odor  FOLLOW UP APPOINTMENT: Unless otherwise instructed by your doctor, return to the clinic in 1 week for packing and/or suture removal. You should be given an appointment when you leave the hospital after surgery, If not, please call for an appointment at [phone]. Depending on the nature of the ear procedure, post-operative appointments may be scheduled every 2-3 weeks after surgery to insure proper healing. Our staff doctors are assisted by two associate doctors to promote quality care.    GENERAL LONG TERM INFORMATION  o Do not fly if you have an upper respiratory infection.  o Patients who have had mastoidectomy or canalplasty will need appointments every 6-12 months for ear cleanings for the remainder of their lives as their ears are no longer self cleaning as normal ears usually are.   o All patients who have had an ear surgery should come for appointments at least once yearly.       IF YOU HAVE QUESTIONS OR CONCERNS,  PLEASE CALL OUR OFFICE  AT [phone] (24 hours/day, 7 days/week)             Rev. 2014      Same-Day Surgery   Adult Discharge Orders & Instructions     For 24 hours after surgery:  1. Get plenty of rest.  A responsible adult must stay with you for at least 24 hours after you leave the hospital.   2. Pain medication can slow your reflexes. Do not drive or use heavy equipment.  If you have weakness or tingling, don't drive or use heavy equipment until this feeling goes away.  3. Mixing alcohol and pain medication can cause dizziness and slow your breathing. It can even be fatal. Do not drink alcohol while taking pain medication.  4. Avoid strenuous or risky activities.  Ask for help when climbing stairs.   5. You may feel lightheaded.  If so, sit for a few minutes before standing.  Have someone help you get up.   6. If you have nausea (feel sick to your stomach), drink only clear liquids such as apple juice, ginger ale, broth or 7-Up.  Rest may also help.  Be sure to drink enough fluids.  Move to a regular diet as you feel able. Take pain medications with a small amount of solid food, such as toast or crackers, to avoid nausea.   7. A slight fever is normal. Call the doctor if your fever is over 100 F (37.7 C) (taken under the tongue) or lasts longer than 24 hours.  8. You may have a dry mouth, muscle aches, trouble sleeping or a sore throat.  These symptoms should go away after 24 hours.  9. Do not make important or legal decisions.   Pain Management:      1. Take pain medication (if prescribed) for pain as directed by your physician.        2. WARNING: If the pain medication you have been prescribed contains Tylenol  (acetaminophen), DO NOT take additional doses of Tylenol (acetaminophen).     Call your doctor for any of the followin.  Signs of infection (fever, growing tenderness at the surgery site, severe pain, a large amount of drainage or bleeding, foul-smelling drainage, redness, swelling).    2.   "It has been over 8 to 10 hours since surgery and you are still not able to urinate (pee).    3.  Headache for over 24 hours.    4.  Numbness, tingling or weakness the day after surgery (if you had spinal anesthesia).  To contact a doctor, call _____________________________________ or:      [phone] and ask for the Resident On Call for:          __________________________________________ (answered 24 hours a day)      Emergency Department:  [facility]: [phone]  [facility]: [phone]               Rev. 10/2014                             Additional Information     If you use hormonal birth control (such as the pill, patch, ring or implants): You'll need a second form of birth control for 7 days (condoms, a diaphragm or contraceptive foam). While in the hospital, you received a medicine called Bridion. Your normal birth control will not work as well for a week after taking this medicine.          Pending Results     No orders found from 10/10/2017 to 10/13/2017.            Admission Information     Date & Time Provider Department Dept. Phone    10/12/2017 Stephan Crowley MD  PACU [phone]      Your Vitals Were     Blood Pressure Pulse Temperature Respirations Height Weight    130/76 105 98.2  F (36.8  C) (Oral) 10 1.6 m (5' 3\") 129.5 kg (285 lb 7.9 oz)    Last Period Pulse Oximetry BMI (Body Mass Index)             10/07/2017 (Exact Date) 97% 50.57 kg/m2         [employer] Information     [employer] lets you send messages to your doctor, view your test results, renew your prescriptions, schedule appointments and more. To sign up, go to [email or web address] . Click on \"Log in\" on the left side of the screen, which will take you to the Welcome page. Then click on \"Sign up Now\" on the right side of the page.     You will be asked to enter the access code listed below, as well as some personal information. Please follow the directions to create your username and " password.     Your access code is: [email or web address]  Expires: 1/10/2018  1:05 PM     Your access code will  in 90 days. If you need help or a new code, please call your [facility] clinic or [phone].        Care EveryWhere ID     This is your Care EveryWhere ID. This could be used by other organizations to access your [facility] medical records  [id number]        Equal Access to Services     CYN JAMES : Hadii roger rodriguez hadasho Soomaali, waaxda luqadaha, qaybta kaalmada adeegyada, waxkeysha jer farnazkhang caputo estella lakeisha . So [facility] [phone].    ATENCIÓN: Si normanla espnathan, tiene a sharif disposición servicios gratuitos de asistencia lingüística. Llame al [phone].    We comply with applicable federal civil rights laws and [place] laws. We do not discriminate on the basis of race, color, national origin, age, disability, sex, sexual orientation, or gender identity.               Review of your medicines      START taking        Dose / Directions    amoxicillin 875 MG tablet   Commonly known as:  AMOXIL        Dose:  875 mg   Take 1 tablet (875 mg) by mouth 2 times daily   Quantity:  20 tablet   Refills:  0       oxyCODONE 5 MG IR tablet   Commonly known as:  ROXICODONE        Dose:  5-10 mg   Take 1-2 tablets (5-10 mg) by mouth every 4 hours as needed for pain   Quantity:  70 tablet   Refills:  0       senna-docusate 8.6-50 MG per tablet   Commonly known as:  SENOKOT-S;PERICOLACE        Dose:  1-2 tablet   Take 1-2 tablets by mouth 2 times daily Take while on oral narcotics to prevent or treat constipation.   Quantity:  30 tablet   Refills:  0         CONTINUE these medicines which have NOT CHANGED        Dose / Directions    BENADRYL PO        Dose:  50 mg   Take 50 mg by mouth nightly as needed   Refills:  0       CITALOPRAM HYDROBROMIDE PO        Dose:  40 mg   Take 40 mg by mouth daily   Refills:  0       etonogestrel-ethinyl estradiol 0.12-0.015 MG/24HR vaginal ring   Commonly known as:  NUVARING         Dose:  1 each   Place 1 each vaginally every 28 days Place for 3 weeks and then remove for one week   Refills:  0       HYDROXYZINE HCL PO        Dose:  25 mg   Take 25 mg by mouth daily as needed for itching   Refills:  0       TYLENOL PO        Dose:  650 mg   Take 650 mg by mouth every 8 hours as needed for mild pain or fever   Refills:  0         STOP taking     oxyCODONE-acetaminophen 5-325 MG per tablet   Commonly known as:  PERCOCET                Where to get your medicines      These medications were sent to [place], [place] - [address]  [address] [facility] [address], [facility] [address]     Phone:  [phone]     amoxicillin 875 MG tablet    senna-docusate 8.6-50 MG per tablet         Some of these will need a paper prescription and others can be bought over the counter. Ask your nurse if you have questions.     Bring a paper prescription for each of these medications     oxyCODONE 5 MG IR tablet               ANTIBIOTIC INSTRUCTION     You've Been Prescribed an Antibiotic - Now What?  Your healthcare team thinks that you or your loved one might have an infection. Some infections can be treated with antibiotics, which are powerful, life-saving drugs. Like all medications, antibiotics have side effects and should only be used when necessary. There are some important things you should know about your antibiotic treatment.      Your healthcare team may run tests before you start taking an antibiotic.    Your team may take samples (e.g., from your blood, urine or other areas) to run tests to look for bacteria. These test can be important to determine if you need an antibiotic at all and, if you do, which antibiotic will work best.      Within a few days, your healthcare team might change or even stop your antibiotic.    Your team may start you on an antibiotic while they are working to find out what is making you sick.    Your team might change your antibiotic because test results  show that a different antibiotic would be better to treat your infection.    In some cases, once your team has more information, they learn that you do not need an antibiotic at all. They may find out that you don't have an infection, or that the antibiotic you're taking won't work against your infection. For example, an infection caused by a virus can't be treated with antibiotics. Staying on an antibiotic when you don't need it is more likely to be harmful than helpful.      You may experience side effects from your antibiotic.    Like all medications, antibiotics have side effects. Some of these can be serious.    Let you healthcare team know if you have any known allergies when you are admitted to the hospital.    One significant side effect of nearly all antibiotics is the risk of severe and sometimes deadly diarrhea caused by Clostridium difficile (C. Difficile). This occurs when a person takes antibiotics because some good germs are destroyed. Antibiotic use allows C. diificile to take over, putting patients at high risk for this serious infection.    As a patient or caregiver, it is important to understand your or your loved one's antibiotic treatment. It is especially important for caregivers to speak up when patients can't speak for themselves. Here are some important questions to ask your healthcare team.    What infection is this antibiotic treating and how do you know I have that infection?    What side effects might occur from this antibiotic?    How long will I need to take this antibiotic?    Is it safe to take this antibiotic with other medications or supplements (e.g., vitamins) that I am taking?     Are there any special directions I need to know about taking this antibiotic? For example, should I take it with food?    How will I be monitored to know whether my infection is responding to the antibiotic?    What tests may help to make sure the right antibiotic is prescribed for me?      Information  provided by:  www.cdc.gov/getsmart  U.S. Department of Health and Human Services  Centers for disease Control and Prevention  National Center for Emerging and Zoonotic Infectious Diseases  Division of Healthcare Quality Promotion         Protect others around you: Learn how to safely use, store and throw away your medicines at www.disposemymeds.org.             Medication List: This is a list of all your medications and when to take them. Check marks below indicate your daily home schedule. Keep this list as a reference.      Medications           Morning Afternoon Evening Bedtime As Needed    amoxicillin 875 MG tablet   Commonly known as:  AMOXIL   Take 1 tablet (875 mg) by mouth 2 times daily                                BENADRYL PO   Take 50 mg by mouth nightly as needed                                CITALOPRAM HYDROBROMIDE PO   Take 40 mg by mouth daily                                etonogestrel-ethinyl estradiol 0.12-0.015 MG/24HR vaginal ring   Commonly known as:  NUVARING   Place 1 each vaginally every 28 days Place for 3 weeks and then remove for one week                                HYDROXYZINE HCL PO   Take 25 mg by mouth daily as needed for itching                                oxyCODONE 5 MG IR tablet   Commonly known as:  ROXICODONE   Take 1-2 tablets (5-10 mg) by mouth every 4 hours as needed for pain                                senna-docusate 8.6-50 MG per tablet   Commonly known as:  SENOKOT-S;PERICOLACE   Take 1-2 tablets by mouth 2 times daily Take while on oral narcotics to prevent or treat constipation.                                TYLENOL PO   Take 650 mg by mouth every 8 hours as needed for mild pain or fever                                          More Information             [facility]  INFORMATION FOR PATIENTS WHO HAVE HAD EAR SURGERY  Dr. Salgado    DO NOT ALLOW WATER OR MOISTURE IN OR AROUND THE EAR CANAL OR INCISION.    DO NOT WASH YOUR  HAIR UNTIL AFTER YOUR FIRST VISIT, FOLLOWING SURGERY, unless your physician gives you approval. Precautions must be taken to avoid any kind of water or moisture in your ear or incision. Keep your hair away from your incision.    DO NOT BLOW YOUR NOSE.  AVOID SNEEZING, IF UNAVOIDABLE, SNEEZE WITH YOUR MOUTH OPEN.    SLEEP WITH YOUR HEAD PROPPED UP on 2 pillows for the first few nights after surgery or until sutures are removed.  This will help reduce swelling and promote drainage.  Good sleep also promotes rapid healing.  *Firm pillows give the best comfort for sleeping.  Some find a non-rocking, overstuffed chair provides comfort.  As long as your head is above your feet a comfortable chair can be used for sleeping.    DO NOT LIFT ANYTHING heavier than 5 to 10 pounds for one week.  Then you may increase on a weekly basis, for example, 2nd week, 10 to 20 pounds; 3rd week 20 to 30 pounds; 4th week usually normal activity.  Stop physical exercises such as aerobics, push ups, sit ups, etc., depending on the nature of the surgery performed.  Usually normal activity can be resumed after one month.    DO NOT STRAIN:  If constipation is a problem, use a laxative.    AVOID QUICK HEAD AND BODY MOVEMENTS, THEY CAN CAUSE DIZZINESS.  Some imbalance after ear surgery is normal, however, spinning dizziness (vertigo) should be reported.    IF THE EAR IS PACKED, DO NOT REMOVE PACKING.    AIRPLANE TRAVEL IS USUALLY RESTRICTED FOR ONE MONTH.  Discuss flying plans with your doctor and measures to protect your ears.    CHANGE THE DRESSING OVER YOUR EAR OR INCISION at least twice each day.  Make sure you or your assistant have CLEAN, DRY HANDS when changing dressing.  Use a cotton tipped applicator to clean incision, first with hydrogen peroxide followed by 70% alcohol.  Then apply a thin layer of Bacitracin Ointment twice daily.  Cover with appropriate dressing, nurse or physician will advise.    LOOK FOR SIGNS OF INFECTION if you have  an incision.  Inspect it each day.  Report increased pain, redness, swelling of drainage to your physician.    SOME DRAINAGE FROM YOUR EAR IS NORMAL AFTER SURGERY.  You will probably hear unusual sounds until total healing is complete, generally in 4 to 6 weeks.    CALL YOUR PHYSICIAN IF:  fever develops, ear drainage increases rather than decreases, or if drainage develops an odor.     Unless instructed otherwise by your physician, return to clinic in one week for packing and/or suture removal  Depending upon the nature of the ear procedure, post-op appointments may be scheduled every 2 to 3 weeks for the first 6 weeks after surgery to insure proper healing.    GENERAL LONG TERM INFORMATION:   *Do not fly if you have an upper respiratory infection   *Patients who have had mastoidectomy or canalplasty will need to make appointments every 6 to 12 months for ear cleaning for the remainder of their lives as their ears are not self-cleaning as normal ears usually are.   *All patients who have had ear surgery should come for appoints at least once yearly.    IF YOU HAVE ANY QUESTIONS OR PROBLEMS PLEASE CALL US AT [phone].  YOU WILL REACH A DOCTOR AT THIS NUMBER 24 HOURS A DAY OR CALL [facility] AT [phone].

## 2017-10-12 NOTE — DISCHARGE INSTRUCTIONS
INFORMATION FOR PATIENTS WHO HAVE HAD EAR SURGERY  DO NOT ALLOW WATER OR MOISTURE IN OR AROUND THE EAR CANAL    OR INCISION  DO NOT WASH YOUR HAIR UNTIL AFTER YOUR FIRST VISIT FOLLOWING SURGERY unless your doctor gives you approval. Precautions must be taken to avoid any kind of water or moisture in your ear incision.   DO NOT BLOW YOUR NOSE. Avoid sneezing, if unavoidable, sneeze with your  mouth open.  SLEEP WITH YOUR HEAD PROPPED UP on two pillows for the first few nights after surgery or until the sutures are removed. This will help reduce swelling and promote drainage. Good sleep also promotes rapid healing.  Firm pillows give the best comfort for sleeping. Some find non-rocking, overstuffed chairs provide comfort. As long as your head is above your feet, a comfortable chair can be used for sleeping.  ACTIVITY:     DO NOT LIFT ANYTHING heavier than 5-10 pounds for 1 week.     Then you may increase on a weekly basis. Example: 2nd week - 10-20 pounds; 3rd week - 20-30 pounds; 4th week - usual normal activity. STOP physical exercises such as aerobics, push-ups, sit-ups, etc, depending on the nature of the surgery performed.      Usually normal activity can be resumed after 1 month.   DO NOT STRAIN. If constipation is a problem, use a laxative.   AVOID QUICK HEAD AND BODY MOVEMENTS, THEY CAN CAUSE DIZZINESS. Some imbalance after surgery is normal, however spinning dizziness (vertigo) should be reported.   IF THE EAR IS PACKED, DO NOT REMOVE THE PACKING   CHANGE THE DRESSING OVER THE INCISION at least twice a day. Make sure you have CLEAN, DRY HANDS when changing the dressing (you will be given supplies including dressings and tape).     Use a cotton-tipped applicator to clean the incision, first with hydrogen peroxide followed by 70% alcohol solution.     Apply a thin layer of Bacitracin ointment twice daily.     Cover with the appropriate dressing. A nurse or physician will advise you on the proper dressing.    LOOK FOR SIGNS OF INFECTION if you have an incision, inspect it daily. Report increased pain, redness, swelling, or drainage to your doctor.   SOME DRAINAGE FROM YOUR EAR IS NORMAL AFTER SURGERY.  You will probably hear unusual sounds until total hearing is complete, generally 4-6 weeks after surgery.    AIRPLANE TRAVEL IS USUALLY RESTRICTED FOR 1 MONTH. Discuss  flying plans with your doctor to discuss measures to protect your ears.    CALL YOUR DOCTOR IF:     Temperature rises to 101 degrees or greater     Ear drainage increases rather than decreases     If ear drainage develops an odor  FOLLOW UP APPOINTMENT: Unless otherwise instructed by your doctor, return to the clinic in 1 week for packing and/or suture removal. You should be given an appointment when you leave the hospital after surgery, If not, please call for an appointment at [phone]. Depending on the nature of the ear procedure, post-operative appointments may be scheduled every 2-3 weeks after surgery to insure proper healing. Our staff doctors are assisted by two associate doctors to promote quality care.    GENERAL LONG TERM INFORMATION  o Do not fly if you have an upper respiratory infection.  o Patients who have had mastoidectomy or canalplasty will need appointments every 6-12 months for ear cleanings for the remainder of their lives as their ears are no longer self cleaning as normal ears usually are.   o All patients who have had an ear surgery should come for appointments at least once yearly.       IF YOU HAVE QUESTIONS OR CONCERNS, PLEASE CALL OUR OFFICE  AT [phone] (24 hours/day, 7 days/week)             Rev. 5/2014      Same-Day Surgery   Adult Discharge Orders & Instructions     For 24 hours after surgery:  1. Get plenty of rest.  A responsible adult must stay with you for at least 24 hours after you leave the hospital.   2. Pain medication can slow your reflexes. Do not drive or use heavy equipment.  If you have weakness  or tingling, don't drive or use heavy equipment until this feeling goes away.  3. Mixing alcohol and pain medication can cause dizziness and slow your breathing. It can even be fatal. Do not drink alcohol while taking pain medication.  4. Avoid strenuous or risky activities.  Ask for help when climbing stairs.   5. You may feel lightheaded.  If so, sit for a few minutes before standing.  Have someone help you get up.   6. If you have nausea (feel sick to your stomach), drink only clear liquids such as apple juice, ginger ale, broth or 7-Up.  Rest may also help.  Be sure to drink enough fluids.  Move to a regular diet as you feel able. Take pain medications with a small amount of solid food, such as toast or crackers, to avoid nausea.   7. A slight fever is normal. Call the doctor if your fever is over 100 F (37.7 C) (taken under the tongue) or lasts longer than 24 hours.  8. You may have a dry mouth, muscle aches, trouble sleeping or a sore throat.  These symptoms should go away after 24 hours.  9. Do not make important or legal decisions.   Pain Management:      1. Take pain medication (if prescribed) for pain as directed by your physician.        2. WARNING: If the pain medication you have been prescribed contains Tylenol  (acetaminophen), DO NOT take additional doses of Tylenol (acetaminophen).     Call your doctor for any of the followin.  Signs of infection (fever, growing tenderness at the surgery site, severe pain, a large amount of drainage or bleeding, foul-smelling drainage, redness, swelling).    2.  It has been over 8 to 10 hours since surgery and you are still not able to urinate (pee).    3.  Headache for over 24 hours.    4.  Numbness, tingling or weakness the day after surgery (if you had spinal anesthesia).  To contact a doctor, call _____________________________________ or:      [phone] and ask for the Resident On Call for:          __________________________________________ (answered 24  hours a day)      Emergency Department:  [place] [facility]: [phone]  [facility]: [phone]               Rev. 10/2014

## 2017-10-12 NOTE — ANESTHESIA CARE TRANSFER NOTE
Patient: Elisha Nunez    Procedure(s):  Right Endural Flexiable Tympanoplasty, Middle Ear Reconstruction,Mastoidectomy, Myringotomy and Tube (#2 Paparella tube), Meatoplasty  - Wound Class: II-Clean Contaminated   - Wound Class: II-Clean Contaminated   - Wound Class: I-Clean    Diagnosis: Otitis Media Right Ear   Diagnosis Additional Information: No value filed.    Anesthesia Type:   General, RSI, ETT     Note:  Airway :Face Mask  Patient transferred to:PACU  Comments: Awake and alert. Regular respirations and patent airway. VSS. Temp 37.6. Dilaudid given for mild pain. Report given to NENA NgoHandoff Report: Identifed the Patient, Identified the Reponsible Provider, Reviewed the pertinent medical history, Discussed the surgical course, Reviewed Intra-OP anesthesia mangement and issues during anesthesia, Set expectations for post-procedure period and Allowed opportunity for questions and acknowledgement of understanding      Vitals: (Last set prior to Anesthesia Care Transfer)    CRNA VITALS  10/12/2017 1129 - 10/12/2017 1203      10/12/2017             Pulse: 101    SpO2: 100 %    Resp Rate (observed): 9                Electronically Signed By: MARKUS Linares CRNA  October 12, 2017  12:03 PM

## 2017-10-22 ENCOUNTER — ANESTHESIA EVENT (OUTPATIENT)
Dept: SURGERY | Facility: CLINIC | Age: 37
End: 2017-10-22
Payer: COMMERCIAL

## 2017-10-23 ENCOUNTER — ANESTHESIA (OUTPATIENT)
Dept: SURGERY | Facility: CLINIC | Age: 37
End: 2017-10-23
Payer: COMMERCIAL

## 2017-10-23 ENCOUNTER — HOSPITAL ENCOUNTER (OUTPATIENT)
Facility: CLINIC | Age: 37
Discharge: HOME | End: 2017-10-23
Attending: OTOLARYNGOLOGY | Admitting: OTOLARYNGOLOGY
Payer: COMMERCIAL

## 2017-10-23 VITALS
RESPIRATION RATE: 16 BRPM | DIASTOLIC BLOOD PRESSURE: 54 MMHG | OXYGEN SATURATION: 96 % | TEMPERATURE: 98.5 F | SYSTOLIC BLOOD PRESSURE: 102 MMHG | HEIGHT: 63 IN | BODY MASS INDEX: 49.69 KG/M2 | WEIGHT: 280.43 LBS

## 2017-10-23 DIAGNOSIS — H66.3X1 CHRONIC SUPPURATIVE OTITIS MEDIA OF RIGHT EAR, UNSPECIFIED OTITIS MEDIA LOCATION: Primary | Chronic | ICD-10-CM

## 2017-10-23 LAB
GLUCOSE BLDC GLUCOMTR-MCNC: 106 MG/DL (ref 70–99)
HCG UR QL: NEGATIVE

## 2017-10-23 PROCEDURE — 25000125 ZZHC RX 250: Performed by: NURSE ANESTHETIST, CERTIFIED REGISTERED

## 2017-10-23 PROCEDURE — 71000015 ZZH RECOVERY PHASE 1 LEVEL 2 EA ADDTL HR: Performed by: OTOLARYNGOLOGY

## 2017-10-23 PROCEDURE — 40000170 ZZH STATISTIC PRE-PROCEDURE ASSESSMENT II: Performed by: OTOLARYNGOLOGY

## 2017-10-23 PROCEDURE — 27210794 ZZH OR GENERAL SUPPLY STERILE: Performed by: OTOLARYNGOLOGY

## 2017-10-23 PROCEDURE — 82962 GLUCOSE BLOOD TEST: CPT

## 2017-10-23 PROCEDURE — 37000009 ZZH ANESTHESIA TECHNICAL FEE, EACH ADDTL 15 MIN: Performed by: OTOLARYNGOLOGY

## 2017-10-23 PROCEDURE — 25000566 ZZH SEVOFLURANE, EA 15 MIN: Performed by: OTOLARYNGOLOGY

## 2017-10-23 PROCEDURE — 36000062 ZZH SURGERY LEVEL 4 1ST 30 MIN - UMMC: Performed by: OTOLARYNGOLOGY

## 2017-10-23 PROCEDURE — 71000027 ZZH RECOVERY PHASE 2 EACH 15 MINS: Performed by: OTOLARYNGOLOGY

## 2017-10-23 PROCEDURE — 25000128 H RX IP 250 OP 636: Performed by: NURSE ANESTHETIST, CERTIFIED REGISTERED

## 2017-10-23 PROCEDURE — 25000132 ZZH RX MED GY IP 250 OP 250 PS 637: Performed by: OTOLARYNGOLOGY

## 2017-10-23 PROCEDURE — 71000014 ZZH RECOVERY PHASE 1 LEVEL 2 FIRST HR: Performed by: OTOLARYNGOLOGY

## 2017-10-23 PROCEDURE — 25000128 H RX IP 250 OP 636: Performed by: OTOLARYNGOLOGY

## 2017-10-23 PROCEDURE — 25000125 ZZHC RX 250: Performed by: ANESTHESIOLOGY

## 2017-10-23 PROCEDURE — 36000064 ZZH SURGERY LEVEL 4 EA 15 ADDTL MIN - UMMC: Performed by: OTOLARYNGOLOGY

## 2017-10-23 PROCEDURE — 25000128 H RX IP 250 OP 636: Performed by: ANESTHESIOLOGY

## 2017-10-23 PROCEDURE — 37000008 ZZH ANESTHESIA TECHNICAL FEE, 1ST 30 MIN: Performed by: OTOLARYNGOLOGY

## 2017-10-23 PROCEDURE — 81025 URINE PREGNANCY TEST: CPT | Performed by: ANESTHESIOLOGY

## 2017-10-23 PROCEDURE — 25000132 ZZH RX MED GY IP 250 OP 250 PS 637: Performed by: NURSE ANESTHETIST, CERTIFIED REGISTERED

## 2017-10-23 PROCEDURE — 25000125 ZZHC RX 250: Performed by: OTOLARYNGOLOGY

## 2017-10-23 RX ORDER — EPHEDRINE SULFATE 50 MG/ML
INJECTION, SOLUTION INTRAMUSCULAR; INTRAVENOUS; SUBCUTANEOUS PRN
Status: DISCONTINUED | OUTPATIENT
Start: 2017-10-23 | End: 2017-10-23

## 2017-10-23 RX ORDER — OXYCODONE HYDROCHLORIDE 5 MG/1
5-10 TABLET ORAL EVERY 6 HOURS PRN
Qty: 30 TABLET | Refills: 0 | Status: SHIPPED | OUTPATIENT
Start: 2017-10-23 | End: 2018-03-01

## 2017-10-23 RX ORDER — MINERAL OIL
OIL (ML) MISCELLANEOUS PRN
Status: DISCONTINUED | OUTPATIENT
Start: 2017-10-23 | End: 2017-10-23 | Stop reason: HOSPADM

## 2017-10-23 RX ORDER — MEPERIDINE HYDROCHLORIDE 25 MG/ML
12.5 INJECTION INTRAMUSCULAR; INTRAVENOUS; SUBCUTANEOUS
Status: DISCONTINUED | OUTPATIENT
Start: 2017-10-23 | End: 2017-10-23 | Stop reason: HOSPADM

## 2017-10-23 RX ORDER — KETOROLAC TROMETHAMINE 30 MG/ML
INJECTION, SOLUTION INTRAMUSCULAR; INTRAVENOUS PRN
Status: DISCONTINUED | OUTPATIENT
Start: 2017-10-23 | End: 2017-10-23

## 2017-10-23 RX ORDER — SCOLOPAMINE TRANSDERMAL SYSTEM 1 MG/1
1 PATCH, EXTENDED RELEASE TRANSDERMAL ONCE
Status: COMPLETED | OUTPATIENT
Start: 2017-10-23 | End: 2017-10-23

## 2017-10-23 RX ORDER — SODIUM CHLORIDE, SODIUM LACTATE, POTASSIUM CHLORIDE, CALCIUM CHLORIDE 600; 310; 30; 20 MG/100ML; MG/100ML; MG/100ML; MG/100ML
INJECTION, SOLUTION INTRAVENOUS CONTINUOUS
Status: DISCONTINUED | OUTPATIENT
Start: 2017-10-23 | End: 2017-10-23 | Stop reason: HOSPADM

## 2017-10-23 RX ORDER — FENTANYL CITRATE 50 UG/ML
INJECTION, SOLUTION INTRAMUSCULAR; INTRAVENOUS PRN
Status: DISCONTINUED | OUTPATIENT
Start: 2017-10-23 | End: 2017-10-23

## 2017-10-23 RX ORDER — ONDANSETRON 4 MG/1
4 TABLET, ORALLY DISINTEGRATING ORAL EVERY 30 MIN PRN
Status: DISCONTINUED | OUTPATIENT
Start: 2017-10-23 | End: 2017-10-23 | Stop reason: HOSPADM

## 2017-10-23 RX ORDER — ONDANSETRON 2 MG/ML
4 INJECTION INTRAMUSCULAR; INTRAVENOUS EVERY 30 MIN PRN
Status: DISCONTINUED | OUTPATIENT
Start: 2017-10-23 | End: 2017-10-23 | Stop reason: HOSPADM

## 2017-10-23 RX ORDER — CEFAZOLIN SODIUM 1 G/3ML
1 INJECTION, POWDER, FOR SOLUTION INTRAMUSCULAR; INTRAVENOUS SEE ADMIN INSTRUCTIONS
Status: DISCONTINUED | OUTPATIENT
Start: 2017-10-23 | End: 2017-10-23 | Stop reason: HOSPADM

## 2017-10-23 RX ORDER — AMOXICILLIN 875 MG
875 TABLET ORAL 2 TIMES DAILY
Qty: 20 TABLET | Refills: 0 | Status: SHIPPED | OUTPATIENT
Start: 2017-10-23 | End: 2017-10-23

## 2017-10-23 RX ORDER — ALBUTEROL SULFATE 90 UG/1
AEROSOL, METERED RESPIRATORY (INHALATION) PRN
Status: DISCONTINUED | OUTPATIENT
Start: 2017-10-23 | End: 2017-10-23

## 2017-10-23 RX ORDER — HYDRALAZINE HYDROCHLORIDE 20 MG/ML
2.5-5 INJECTION INTRAMUSCULAR; INTRAVENOUS EVERY 10 MIN PRN
Status: DISCONTINUED | OUTPATIENT
Start: 2017-10-23 | End: 2017-10-23 | Stop reason: HOSPADM

## 2017-10-23 RX ORDER — SODIUM CHLORIDE, SODIUM LACTATE, POTASSIUM CHLORIDE, CALCIUM CHLORIDE 600; 310; 30; 20 MG/100ML; MG/100ML; MG/100ML; MG/100ML
INJECTION, SOLUTION INTRAVENOUS CONTINUOUS PRN
Status: DISCONTINUED | OUTPATIENT
Start: 2017-10-23 | End: 2017-10-23

## 2017-10-23 RX ORDER — GLYCOPYRROLATE 0.2 MG/ML
INJECTION, SOLUTION INTRAMUSCULAR; INTRAVENOUS PRN
Status: DISCONTINUED | OUTPATIENT
Start: 2017-10-23 | End: 2017-10-23

## 2017-10-23 RX ORDER — LIDOCAINE HYDROCHLORIDE 20 MG/ML
INJECTION, SOLUTION INFILTRATION; PERINEURAL PRN
Status: DISCONTINUED | OUTPATIENT
Start: 2017-10-23 | End: 2017-10-23

## 2017-10-23 RX ORDER — ONDANSETRON 2 MG/ML
INJECTION INTRAMUSCULAR; INTRAVENOUS PRN
Status: DISCONTINUED | OUTPATIENT
Start: 2017-10-23 | End: 2017-10-23

## 2017-10-23 RX ORDER — NALOXONE HYDROCHLORIDE 0.4 MG/ML
.1-.4 INJECTION, SOLUTION INTRAMUSCULAR; INTRAVENOUS; SUBCUTANEOUS
Status: DISCONTINUED | OUTPATIENT
Start: 2017-10-23 | End: 2017-10-23 | Stop reason: HOSPADM

## 2017-10-23 RX ORDER — PROPOFOL 10 MG/ML
INJECTION, EMULSION INTRAVENOUS PRN
Status: DISCONTINUED | OUTPATIENT
Start: 2017-10-23 | End: 2017-10-23

## 2017-10-23 RX ORDER — FENTANYL CITRATE 50 UG/ML
25-50 INJECTION, SOLUTION INTRAMUSCULAR; INTRAVENOUS
Status: DISCONTINUED | OUTPATIENT
Start: 2017-10-23 | End: 2017-10-23 | Stop reason: HOSPADM

## 2017-10-23 RX ORDER — NEOSTIGMINE METHYLSULFATE 1 MG/ML
VIAL (ML) INJECTION PRN
Status: DISCONTINUED | OUTPATIENT
Start: 2017-10-23 | End: 2017-10-23

## 2017-10-23 RX ORDER — EPINEPHRINE NASAL SOLUTION 1 MG/ML
SOLUTION NASAL PRN
Status: DISCONTINUED | OUTPATIENT
Start: 2017-10-23 | End: 2017-10-23 | Stop reason: HOSPADM

## 2017-10-23 RX ORDER — OXYCODONE HYDROCHLORIDE 5 MG/1
10 TABLET ORAL ONCE
Status: COMPLETED | OUTPATIENT
Start: 2017-10-23 | End: 2017-10-23

## 2017-10-23 RX ORDER — CEFAZOLIN SODIUM 1 G/50ML
3 SOLUTION INTRAVENOUS
Status: COMPLETED | OUTPATIENT
Start: 2017-10-23 | End: 2017-10-23

## 2017-10-23 RX ORDER — OXYCODONE HYDROCHLORIDE 5 MG/1
5-10 TABLET ORAL EVERY 6 HOURS PRN
Qty: 50 TABLET | Refills: 0 | Status: SHIPPED | OUTPATIENT
Start: 2017-10-23 | End: 2017-10-23

## 2017-10-23 RX ORDER — AMOXICILLIN 875 MG
875 TABLET ORAL 2 TIMES DAILY
Qty: 20 TABLET | Refills: 0 | Status: SHIPPED | OUTPATIENT
Start: 2017-10-23 | End: 2018-03-01

## 2017-10-23 RX ORDER — LIDOCAINE HYDROCHLORIDE AND EPINEPHRINE 5; 5 MG/ML; UG/ML
INJECTION, SOLUTION INFILTRATION; PERINEURAL PRN
Status: DISCONTINUED | OUTPATIENT
Start: 2017-10-23 | End: 2017-10-23 | Stop reason: HOSPADM

## 2017-10-23 RX ORDER — CETIRIZINE HYDROCHLORIDE 10 MG/1
10 TABLET ORAL DAILY
COMMUNITY
End: 2024-09-09

## 2017-10-23 RX ORDER — HYDROMORPHONE HYDROCHLORIDE 1 MG/ML
.3-.5 INJECTION, SOLUTION INTRAMUSCULAR; INTRAVENOUS; SUBCUTANEOUS EVERY 10 MIN PRN
Status: DISCONTINUED | OUTPATIENT
Start: 2017-10-23 | End: 2017-10-23 | Stop reason: HOSPADM

## 2017-10-23 RX ADMIN — FENTANYL CITRATE 100 MCG: 50 INJECTION, SOLUTION INTRAMUSCULAR; INTRAVENOUS at 07:45

## 2017-10-23 RX ADMIN — FENTANYL CITRATE 50 MCG: 50 INJECTION, SOLUTION INTRAMUSCULAR; INTRAVENOUS at 08:17

## 2017-10-23 RX ADMIN — Medication 35 MG: at 07:48

## 2017-10-23 RX ADMIN — Medication 4 MG: at 08:39

## 2017-10-23 RX ADMIN — FENTANYL CITRATE 50 MCG: 50 INJECTION, SOLUTION INTRAMUSCULAR; INTRAVENOUS at 09:19

## 2017-10-23 RX ADMIN — FENTANYL CITRATE 50 MCG: 50 INJECTION, SOLUTION INTRAMUSCULAR; INTRAVENOUS at 09:06

## 2017-10-23 RX ADMIN — Medication 0.8 MG: at 08:39

## 2017-10-23 RX ADMIN — ALBUTEROL SULFATE 6 PUFF: 90 AEROSOL, METERED RESPIRATORY (INHALATION) at 08:09

## 2017-10-23 RX ADMIN — ONDANSETRON 4 MG: 2 INJECTION INTRAMUSCULAR; INTRAVENOUS at 08:39

## 2017-10-23 RX ADMIN — Medication 5 MG: at 07:56

## 2017-10-23 RX ADMIN — Medication 3 G: at 08:09

## 2017-10-23 RX ADMIN — PHENYLEPHRINE HYDROCHLORIDE 100 MCG: 10 INJECTION, SOLUTION INTRAMUSCULAR; INTRAVENOUS; SUBCUTANEOUS at 08:33

## 2017-10-23 RX ADMIN — SCOLOPAMINE TRANSDERMAL SYSTEM 1 PATCH: 1 PATCH, EXTENDED RELEASE TRANSDERMAL at 07:02

## 2017-10-23 RX ADMIN — KETOROLAC TROMETHAMINE 30 MG: 30 INJECTION, SOLUTION INTRAMUSCULAR at 08:39

## 2017-10-23 RX ADMIN — PROPOFOL 150 MG: 10 INJECTION, EMULSION INTRAVENOUS at 07:47

## 2017-10-23 RX ADMIN — FENTANYL CITRATE 50 MCG: 50 INJECTION, SOLUTION INTRAMUSCULAR; INTRAVENOUS at 08:51

## 2017-10-23 RX ADMIN — PHENYLEPHRINE HYDROCHLORIDE 100 MCG: 10 INJECTION, SOLUTION INTRAMUSCULAR; INTRAVENOUS; SUBCUTANEOUS at 07:57

## 2017-10-23 RX ADMIN — HYDROMORPHONE HYDROCHLORIDE 0.5 MG: 1 INJECTION, SOLUTION INTRAMUSCULAR; INTRAVENOUS; SUBCUTANEOUS at 09:30

## 2017-10-23 RX ADMIN — SODIUM CHLORIDE, POTASSIUM CHLORIDE, SODIUM LACTATE AND CALCIUM CHLORIDE: 600; 310; 30; 20 INJECTION, SOLUTION INTRAVENOUS at 07:34

## 2017-10-23 RX ADMIN — OXYCODONE HYDROCHLORIDE 10 MG: 5 TABLET ORAL at 09:57

## 2017-10-23 RX ADMIN — LIDOCAINE HYDROCHLORIDE 100 MG: 20 INJECTION, SOLUTION INFILTRATION; PERINEURAL at 07:46

## 2017-10-23 RX ADMIN — HYDROMORPHONE HYDROCHLORIDE 0.5 MG: 1 INJECTION, SOLUTION INTRAMUSCULAR; INTRAVENOUS; SUBCUTANEOUS at 09:43

## 2017-10-23 NOTE — ANESTHESIA CARE TRANSFER NOTE
Patient: Elisha Nunez    Procedure(s):  Right Removal of Granulation Tissue, Removal of Packing and Sutures, Thiersch Graft - Wound Class: I-Clean    Diagnosis: Post Mastoiditis  Diagnosis Additional Information: No value filed.    Anesthesia Type:   General, ETT     Note:  Airway :Face Mask  Patient transferred to:PACU  Comments: .Anesthesia Care Transfer Note    Patient: Elisha Nunez    Transferred to: PACU    Patient vital signs: stable    Airway: none    Monitors applied, VSS.  Patient awake and comfortable, breathing spontaneously.  Report given to RN with transfer of care.        Elisha Ramos CRNA  10/23/2017  8:52 AM  Handoff Report: Identifed the Patient, Identified the Reponsible Provider, Reviewed the pertinent medical history, Discussed the surgical course, Reviewed Intra-OP anesthesia mangement and issues during anesthesia, Set expectations for post-procedure period and Allowed opportunity for questions and acknowledgement of understanding      Vitals: (Last set prior to Anesthesia Care Transfer)    CRNA VITALS  10/23/2017 0816 - 10/23/2017 0852      10/23/2017             Resp Rate (observed): (!)  2                Electronically Signed By: MARKUS Garcia CRNA  October 23, 2017  8:52 AM

## 2017-10-23 NOTE — ANESTHESIA PREPROCEDURE EVALUATION
Anesthesia Plan      History & Physical Review  History and physical reviewed and following examination; no interval change.    ASA Status:  2 .    NPO Status:  > 6 hours    Plan for General and ETT with Intravenous induction. Maintenance will be Balanced.    PONV prophylaxis:  Ondansetron (or other 5HT-3) and Dexamethasone or Solumedrol       Postoperative Care  Postoperative pain management:  Multi-modal analgesia.      Consents  Anesthetic plan, risks, benefits and alternatives discussed with:  Patient..        ANESTHESIA PREOP EVALUATION    Procedure: GRAFT THIERSCH STATUS POST TYMPANOMASTOIDECTOMY     HPI: patient with chronic ear infections and mastoiditis, s/p mastoidectomy    PMHx/PSHx/ROS:  Past Medical History:   Diagnosis Date     Anxiety      Depression      Elevated liver enzymes 9/2015    ultrasound showed enlarged liver.      Hearing loss        Past Surgical History:   Procedure Laterality Date     MASTOIDECTOMY Right 10/12/2017    Procedure: MASTOIDECTOMY;;  Surgeon: Stephan Crowley MD;  Location: [facility]     MEATOPLASTY EAR Right 10/12/2017    Procedure: MEATOPLASTY EAR;;  Surgeon: Stephan Crowley MD;  Location: [facility]     MYRINGOTOMY, INSERT TUBE BILATERAL, COMBINED Bilateral 5/14/2015    Procedure: COMBINED MYRINGOTOMY, INSERT TUBE BILATERAL;  Surgeon: Stephan Crowley MD;  Location: [facility]     PE TUBES       TONSILLECTOMY       TYMPANOPLASTY Left 5/14/2015    Procedure: TYMPANOPLASTY;  Surgeon: Stephan Crowley MD;  Location: [facility]     TYMPANOPLASTY Right 10/12/2017    Procedure: TYMPANOPLASTY;  Right Endural Flexiable Tympanoplasty, Middle Ear Reconstruction,Mastoidectomy, Myringotomy and Tube (#2 Paparella tube), Meatoplasty ;  Surgeon: Stephan Crowley MD;  Location: [facility]     Gen: obesity  CV: neg  Pulm: neg  GI: GERD  : neg  Endo: neg  CNS/Psych: anxiety  MSK: neg  Heme: neg  HEENT: as above    Past Anes Hx: No personal or family h/o anesthesia  problems    Soc Hx:   Tobacco: neg  EtOH: neg    Allergies: No Known Allergies    Meds:   No prescriptions prior to admission.       Current Outpatient Prescriptions   Medication Sig Dispense Refill     DiphenhydrAMINE HCl (BENADRYL PO) Take 50 mg by mouth nightly as needed       senna-docusate (SENOKOT-S;PERICOLACE) 8.6-50 MG per tablet Take 1-2 tablets by mouth 2 times daily Take while on oral narcotics to prevent or treat constipation. 30 tablet 0     amoxicillin (AMOXIL) 875 MG tablet Take 1 tablet (875 mg) by mouth 2 times daily 20 tablet 0     oxyCODONE (ROXICODONE) 5 MG IR tablet Take 1-2 tablets (5-10 mg) by mouth every 4 hours as needed for pain 70 tablet 0     HYDROXYZINE HCL PO Take 25 mg by mouth daily as needed for itching       Acetaminophen (TYLENOL PO) Take 650 mg by mouth every 8 hours as needed for mild pain or fever       etonogestrel-ethinyl estradiol (NUVARING) 0.12-0.015 MG/24HR vaginal ring Place 1 each vaginally every 28 days Place for 3 weeks and then remove for one week       CITALOPRAM HYDROBROMIDE PO Take 40 mg by mouth daily       Physical Exam:  Weight 130 kg.  Airway: easy intubation in the past, easy mask ventilation in the past  Dentition: noted  Heart: rrr  Lungs: ctab    NPO Status:OK    BMP:  Recent Labs   Lab Test  06/28/16 1910   NA  140   POTASSIUM  3.8   CHLORIDE  108   CO2  22   BUN  8   CR  0.59   GLC  75   SARINA  9.0     LFTs:   Recent Labs   Lab Test  06/28/16 1910   PROTTOTAL  8.2   ALBUMIN  4.0   BILITOTAL  0.3   ALKPHOS  94   AST  72*   ALT  49     CBC:   Recent Labs   Lab Test  06/28/16 1910   WBC  8.0   RBC  4.54   HGB  13.9   HCT  40.6   MCV  89   MCH  30.6   MCHC  34.2   RDW  12.8   PLT  214     Assessment/Plan:  - ASA 2  - GETA with standard ASA monitors, IV induction, balanced anesthetic  - PIVx1  - Antibiotics per surgery  - PONV prophylaxis  - Relevant risks, benefits, alternatives and the anesthetic plan were discussed with patient/family or family  representative.  All questions were answered and there was agreement to proceed.      Yudelka James MD  Staff Anesthesiologist  [phone]    10/23/2017  4:56 AM

## 2017-10-23 NOTE — INTERVAL H&P NOTE
Ms Nunez was seen on 10/20/2017 and there is no change in clinical status. Will proceed with right ear thiersch graft as planned.

## 2017-10-23 NOTE — ANESTHESIA POSTPROCEDURE EVALUATION
Patient: Elisha Nunez    Procedure(s):  Right Removal of Granulation Tissue, Removal of Packing and Sutures, Thiersch Graft - Wound Class: I-Clean    Diagnosis:Post Mastoiditis  Diagnosis Additional Information: No value filed.    Anesthesia Type:  General, ETT    Note:  Anesthesia Post Evaluation    Patient location during evaluation: bedside  Patient participation: Able to fully participate in evaluation  Level of consciousness: awake and alert  Pain management: adequate  Airway patency: patent  Cardiovascular status: hemodynamically stable  Respiratory status: spontaneous ventilation  Hydration status: euvolemic  PONV: none     Anesthetic complications: None          Last vitals:  Vitals:    10/23/17 1000 10/23/17 1015 10/23/17 1030   BP: 99/56  102/54   Resp: 16  16   Temp: 36.9  C (98.5  F)  36.9  C (98.5  F)   SpO2: 93% 95% 96%         Electronically Signed By: Yudelka James MD  October 23, 2017  10:44 AM

## 2017-10-23 NOTE — BRIEF OP NOTE
[facility] Brief Operative Note    Pre-operative diagnosis: Right mastoiditis   Post-operative diagnosis: Same   Procedure: Right removal of packing and granulation tissue; Thiersch graft to canal and meatus   Surgeon: Stephan Morgan MD; Stephan Crowley MD   Assistant(s): None   Anesthesia: General endotracheal anesthesia   Estimated blood loss: * No values recorded between 10/23/2017  8:12 AM and 10/23/2017  8:46 AM *   Total IV fluids: (See anesthesia record)   Blood transfusion: No transfusion was given during surgery   Total urine output: (See anesthesia record)   Drains: None   Specimens: None   Implants: None   Findings: 1. Meatus large - ok; 2. EAC somewhat obstructive and posterior canal sloping; 3. Bare bone dry and adjacent to TM and posterior canal; 4. Need to follow obstructive EAC may need open cavity mastoid in the future.   Complications: None   Condition: Stable   Comments: See dictated operative report for full details

## 2017-10-23 NOTE — OP NOTE
DATE OF VISIT:  10/23/2017      ATTENDING:  Estephania Crowley MD.      FELLOW:  Estephania Martins MD.      NAME OF PROCEDURE:  Right ear removal of granulation tissue, packing and sutures with a Thiersch graft to canal and meatus.      PREOPERATIVE DIAGNOSIS:  Chronic mastoiditis.      POSTOPERATIVE DIAGNOSIS:  Chronic mastoiditis.      INDICATIONS:  Elisha Nunez underwent an ear surgery about 1 week ago with an atticotomy and a meatoplasty.  She has some exposed bone and and granulation and desires this to be covered with a skin graft to improve the healing process.        OPERATIVE FINDINGS:     1.  Meatus is large and looks okay.   2.  The ear canal is somewhat obstructive at the posterior canal skin flap.   3.  There is bare bone that is dry adjacent to the ear drum and on the posterior canal.   4.  There is need to follow this obstructed ear canal and may need an open cavity mastoidectomy in the future.      ANESTHESIA:  General inhalational.      DESCRIPTION OF PROCEDURE:  Informed consent was obtained.  The patient was taken to the operative suite.  Surgical site was prepped and draped in the usual standard sterile fashion.  A timeout was conducted per protocol.  Packing is removed from the ear and adrenaline and cotton is placed in the ear canal to help with hemostasis.  Once this is a sufficient, the arm is examined and a series of split thickness skin grafts were shaved from the right upper arm.  These were placed with the epithelial side down against silk and then are cut in customized fashion to fit the precise needs of her ear canal.  The skin graft and silk was then placed in the ear canal at the sites of exposed bone and granulation tissue.  Once this was completed, the patient was prepared for emergence.  Estimated blood loss for the operation is minimal.  Dr. Crowley was present and performed the procedure.  Specimens are none.  Complications are none.         ESTEPHANIA MARTINS MD              D: 10/23/2017 11:23   T: 10/23/2017 11:57   MT: SUKI      Name:     INGRIS GUTIERREZ   MRN:      [id number]        Account:        [id number]   :      1980           Procedure Date: 10/23/2017      Document: [id number]

## 2017-10-23 NOTE — IP AVS SNAPSHOT
[facility]    [address] [address]    Phone:  [phone]                                       After Visit Summary   10/23/2017    Elisha Nunez    MRN: [id number]           After Visit Summary Signature Page     I have received my discharge instructions, and my questions have been answered. I have discussed any challenges I see with this plan with the nurse or doctor.    ..........................................................................................................................................  Patient/Patient Representative Signature      ..........................................................................................................................................  Patient Representative Print Name and Relationship to Patient    ..................................................               ................................................  Date                                            Time    ..........................................................................................................................................  Reviewed by Signature/Title    ...................................................              ..............................................  Date                                                            Time

## 2017-10-23 NOTE — DISCHARGE INSTRUCTIONS
Same-Day Surgery   Adult Discharge Orders & Instructions     For 24 hours after surgery:  1. Get plenty of rest.  A responsible adult must stay with you for at least 24 hours after you leave the hospital.   2. Pain medication can slow your reflexes. Do not drive or use heavy equipment.  If you have weakness or tingling, don't drive or use heavy equipment until this feeling goes away.  3. Mixing alcohol and pain medication can cause dizziness and slow your breathing. It can even be fatal. Do not drink alcohol while taking pain medication.  4. Avoid strenuous or risky activities.  Ask for help when climbing stairs.   5. You may feel lightheaded.  If so, sit for a few minutes before standing.  Have someone help you get up.   6. If you have nausea (feel sick to your stomach), drink only clear liquids such as apple juice, ginger ale, broth or 7-Up.  Rest may also help.  Be sure to drink enough fluids.  Move to a regular diet as you feel able. Take pain medications with a small amount of solid food, such as toast or crackers, to avoid nausea.   7. A slight fever is normal. Call the doctor if your fever is over 100 F (37.7 C) (taken under the tongue) or lasts longer than 24 hours.  8. You may have a dry mouth, muscle aches, trouble sleeping or a sore throat.  These symptoms should go away after 24 hours.  9. Do not make important or legal decisions.   Pain Management:      1. Take pain medication (if prescribed) for pain as directed by your physician.        2. WARNING: If the pain medication you have been prescribed contains Tylenol  (acetaminophen), DO NOT take additional doses of Tylenol (acetaminophen).     Call your doctor for any of the followin.  Signs of infection (fever, growing tenderness at the surgery site, severe pain, a large amount of drainage or bleeding, foul-smelling drainage, redness, swelling).    2.  It has been over 8 to 10 hours since surgery and you are still not able to urinate (pee).    3.   Headache for over 24 hours.    4.  Numbness, tingling or weakness the day after surgery (if you had spinal anesthesia).  To contact a doctor, call _____________________________________ or:      [phone] and ask for the Resident On Call for:          __________________________________________ (answered 24 hours a day)      Emergency Department:  [place] [facility]: [phone]  [place] [facility]: [phone]               Rev. 10/2014

## 2017-10-23 NOTE — IP AVS SNAPSHOT
MRN:[id number]                      After Visit Summary   10/23/2017    Elisha Nunez    MRN: [id number]           Thank you!     Thank you for choosing [facility] for your care. Our goal is always to provide you with excellent care. Hearing back from our patients is one way we can continue to improve our services. Please take a few minutes to complete the written survey that you may receive in the mail after you visit with us. Thank you!        Patient Information     Date Of Birth          1980        About your hospital stay     You were admitted on:  October 23, 2017 You last received care in the:   PACU    You were discharged on:  October 23, 2017       Who to Call     For medical emergencies, please call 911.  For non-urgent questions about your medical care, please call your primary care provider or clinic, [phone]  For questions related to your surgery, please call your surgery clinic        Attending Provider     Provider Specialty    Stephan Crowley MD Otolaryngology       Primary Care Provider Office Phone # Fax #    Julia Beltran [phone]      After Care Instructions     Check with Provider when to start anticoagulants           Diet Instructions       Resume pre procedure diet            Discharge Instructions       Patient to follow up with surgeon in 8 days            Discharge Instructions - Lifting restrictions       Lifting Restrictions 10 pounds until seen at Post-op follow up appointment            No Alcohol       For 24 hours following procedure            No Aspirin, Ibuprofen or Naproxen products       for 7 - 10 days following surgery            No blowing nose       No popping of the ears or airline travel until approved by your doctor.            No driving or operating machinery       until the day after procedure            Notify Physician        If bleeding or dressing becomes loose or dislodged            Wound care       Keep  dressing clean and dry and intact. Ok to remove large dressing the day after surgery. Replace the cotton ball as needed for drainage.                  Further instructions from your care team       Same-Day Surgery   Adult Discharge Orders & Instructions     For 24 hours after surgery:  1. Get plenty of rest.  A responsible adult must stay with you for at least 24 hours after you leave the hospital.   2. Pain medication can slow your reflexes. Do not drive or use heavy equipment.  If you have weakness or tingling, don't drive or use heavy equipment until this feeling goes away.  3. Mixing alcohol and pain medication can cause dizziness and slow your breathing. It can even be fatal. Do not drink alcohol while taking pain medication.  4. Avoid strenuous or risky activities.  Ask for help when climbing stairs.   5. You may feel lightheaded.  If so, sit for a few minutes before standing.  Have someone help you get up.   6. If you have nausea (feel sick to your stomach), drink only clear liquids such as apple juice, ginger ale, broth or 7-Up.  Rest may also help.  Be sure to drink enough fluids.  Move to a regular diet as you feel able. Take pain medications with a small amount of solid food, such as toast or crackers, to avoid nausea.   7. A slight fever is normal. Call the doctor if your fever is over 100 F (37.7 C) (taken under the tongue) or lasts longer than 24 hours.  8. You may have a dry mouth, muscle aches, trouble sleeping or a sore throat.  These symptoms should go away after 24 hours.  9. Do not make important or legal decisions.   Pain Management:      1. Take pain medication (if prescribed) for pain as directed by your physician.        2. WARNING: If the pain medication you have been prescribed contains Tylenol  (acetaminophen), DO NOT take additional doses of Tylenol (acetaminophen).     Call your doctor for any of the followin.  Signs of infection (fever, growing tenderness at the surgery site,  "severe pain, a large amount of drainage or bleeding, foul-smelling drainage, redness, swelling).    2.  It has been over 8 to 10 hours since surgery and you are still not able to urinate (pee).    3.  Headache for over 24 hours.    4.  Numbness, tingling or weakness the day after surgery (if you had spinal anesthesia).  To contact a doctor, call _____________________________________ or:      [phone] and ask for the Resident On Call for:          __________________________________________ (answered 24 hours a day)      Emergency Department:  [facility]: [phone]  [facility]: [phone]               Rev. 10/2014       Pending Results     No orders found from 10/21/2017 to 10/24/2017.            Admission Information     Date & Time Provider Department Dept. Phone    10/23/2017 Stephan Crowley MD  PACU [phone]      Your Vitals Were     Blood Pressure Temperature Respirations Height Weight Last Period     98.6  F (37  C) (Axillary) 17 1.6 m (5' 3\") 127.2 kg (280 lb 6.8 oz) 10/07/2017 (Exact Date)    Pulse Oximetry BMI (Body Mass Index)                100% 49.68 kg/m2          [employer] Information     [employer] lets you send messages to your doctor, view your test results, renew your prescriptions, schedule appointments and more. To sign up, go to [email or web address] . Click on \"Log in\" on the left side of the screen, which will take you to the Welcome page. Then click on \"Sign up Now\" on the right side of the page.     You will be asked to enter the access code listed below, as well as some personal information. Please follow the directions to create your username and password.     Your access code is: [email or web address]  Expires: 1/10/2018  1:05 PM     Your access code will  in 90 days. If you need help or a new code, please call your [facility] or [phone].        Care EveryWhere ID     This is your Care EveryWhere ID. This could be used by " other organizations to access your [facility] medical records  [id number]        Equal Access to Services     CYN JAMES : Axel Son, adwoa ureña, tessa martínez, jailene oneill. So [facility] [phone].    ATENCIÓN: Si habla español, tiene a sharif disposición servicios gratuitos de asistencia lingüística. Llame al [phone].    We comply with applicable federal civil rights laws and [place] laws. We do not discriminate on the basis of race, color, national origin, age, disability, sex, sexual orientation, or gender identity.               Review of your medicines      START taking        Dose / Directions    amoxicillin 875 MG tablet   Commonly known as:  AMOXIL        Dose:  875 mg   Take 1 tablet (875 mg) by mouth 2 times daily   Quantity:  20 tablet   Refills:  0       oxyCODONE 5 MG IR tablet   Commonly known as:  ROXICODONE        Dose:  5-10 mg   Take 1-2 tablets (5-10 mg) by mouth every 6 hours as needed for pain or other (Moderate to Severe)   Quantity:  30 tablet   Refills:  0         CONTINUE these medicines which have NOT CHANGED        Dose / Directions    BENADRYL PO        Dose:  50 mg   Take 50 mg by mouth nightly as needed   Refills:  0       cetirizine 10 MG tablet   Commonly known as:  zyrTEC        Dose:  10 mg   Take 10 mg by mouth daily   Refills:  0       CITALOPRAM HYDROBROMIDE PO        Dose:  40 mg   Take 40 mg by mouth daily   Refills:  0       etonogestrel-ethinyl estradiol 0.12-0.015 MG/24HR vaginal ring   Commonly known as:  NUVARING        Dose:  1 each   Place 1 each vaginally every 28 days Place for 3 weeks and then remove for one week   Refills:  0       HYDROXYZINE HCL PO        Dose:  25 mg   Take 25 mg by mouth daily as needed for itching   Refills:  0       senna-docusate 8.6-50 MG per tablet   Commonly known as:  SENOKOT-S;PERICOLACE        Dose:  1-2 tablet   Take 1-2 tablets by mouth 2 times daily Take while on  oral narcotics to prevent or treat constipation.   Quantity:  30 tablet   Refills:  0         STOP taking     TYLENOL PO                Where to get your medicines      These medications were sent to [facility] [place], [place] - [address]  [address], [facility] [address]     Phone:  [phone]     amoxicillin 875 MG tablet         Some of these will need a paper prescription and others can be bought over the counter. Ask your nurse if you have questions.     Bring a paper prescription for each of these medications     oxyCODONE 5 MG IR tablet               ANTIBIOTIC INSTRUCTION     You've Been Prescribed an Antibiotic - Now What?  Your healthcare team thinks that you or your loved one might have an infection. Some infections can be treated with antibiotics, which are powerful, life-saving drugs. Like all medications, antibiotics have side effects and should only be used when necessary. There are some important things you should know about your antibiotic treatment.      Your healthcare team may run tests before you start taking an antibiotic.    Your team may take samples (e.g., from your blood, urine or other areas) to run tests to look for bacteria. These test can be important to determine if you need an antibiotic at all and, if you do, which antibiotic will work best.      Within a few days, your healthcare team might change or even stop your antibiotic.    Your team may start you on an antibiotic while they are working to find out what is making you sick.    Your team might change your antibiotic because test results show that a different antibiotic would be better to treat your infection.    In some cases, once your team has more information, they learn that you do not need an antibiotic at all. They may find out that you don't have an infection, or that the antibiotic you're taking won't work against your infection. For example, an infection caused by a virus can't be  treated with antibiotics. Staying on an antibiotic when you don't need it is more likely to be harmful than helpful.      You may experience side effects from your antibiotic.    Like all medications, antibiotics have side effects. Some of these can be serious.    Let you healthcare team know if you have any known allergies when you are admitted to the hospital.    One significant side effect of nearly all antibiotics is the risk of severe and sometimes deadly diarrhea caused by Clostridium difficile (C. Difficile). This occurs when a person takes antibiotics because some good germs are destroyed. Antibiotic use allows C. diificile to take over, putting patients at high risk for this serious infection.    As a patient or caregiver, it is important to understand your or your loved one's antibiotic treatment. It is especially important for caregivers to speak up when patients can't speak for themselves. Here are some important questions to ask your healthcare team.    What infection is this antibiotic treating and how do you know I have that infection?    What side effects might occur from this antibiotic?    How long will I need to take this antibiotic?    Is it safe to take this antibiotic with other medications or supplements (e.g., vitamins) that I am taking?     Are there any special directions I need to know about taking this antibiotic? For example, should I take it with food?    How will I be monitored to know whether my infection is responding to the antibiotic?    What tests may help to make sure the right antibiotic is prescribed for me?      Information provided by:  www.cdc.gov/getsmart  U.S. Department of Health and Human Services  Centers for disease Control and Prevention  National Center for Emerging and Zoonotic Infectious Diseases  Division of Healthcare Quality Promotion         Protect others around you: Learn how to safely use, store and throw away your medicines at www.disposemymeds.org.              Medication List: This is a list of all your medications and when to take them. Check marks below indicate your daily home schedule. Keep this list as a reference.      Medications           Morning Afternoon Evening Bedtime As Needed    amoxicillin 875 MG tablet   Commonly known as:  AMOXIL   Take 1 tablet (875 mg) by mouth 2 times daily                                BENADRYL PO   Take 50 mg by mouth nightly as needed                                cetirizine 10 MG tablet   Commonly known as:  zyrTEC   Take 10 mg by mouth daily                                CITALOPRAM HYDROBROMIDE PO   Take 40 mg by mouth daily                                etonogestrel-ethinyl estradiol 0.12-0.015 MG/24HR vaginal ring   Commonly known as:  NUVARING   Place 1 each vaginally every 28 days Place for 3 weeks and then remove for one week                                HYDROXYZINE HCL PO   Take 25 mg by mouth daily as needed for itching                                oxyCODONE 5 MG IR tablet   Commonly known as:  ROXICODONE   Take 1-2 tablets (5-10 mg) by mouth every 6 hours as needed for pain or other (Moderate to Severe)                                senna-docusate 8.6-50 MG per tablet   Commonly known as:  SENOKOT-S;PERICOLACE   Take 1-2 tablets by mouth 2 times daily Take while on oral narcotics to prevent or treat constipation.                                          More Information             [facility]  Home Care Instructions Following Thiersch Grafting  Dr. Salgado  EXPLANATION  During Thiersch grafting, a transparent piece of skin is taken from the upper arm and placed inside the ear canal over the exposed tissue.  A silk material and ointment are placed over the graft to hold it in place.  The silk material is removed one week following procedure.  A small, clear cellophane dressing is placed over the skin graft site.  This is usually removed on your post-op visit in the  office.    CARE OF THE EAR    Do not allow water or any moisture into the ear    Change eye pad over ear at least once a day.  Inspect ear for drainage or an odor.    Do not put cotton into the ear.    CARE OF THE DONOR SITE    Keep the area dry.    Do not remove the clear cellophane dressing.  If blood collects under it, you may sterilize a needle with rubbing alcohol, puncture the cellophane in one area and milk out the blood collection.  Change the gauze wrap as needed.    CALL PHYSICIAN IF:    Ear or donor site bleeds, drains or develops an odor.    Fever develops.    Excessive pain develops.  Usually there is a little ear discomfort.   There may be some donor site discomfort the first few days.  Tylenol is usually sufficient to relieve it.     Patients should make an appointment to return to the clinic in one week unless other arrangements are made with their physician.    If you have any questions or problems, please call us at [phone].  You will reach a doctor at that number 24 hours a day.  [facility] at [phone].

## 2018-02-13 ENCOUNTER — HOSPITAL ENCOUNTER (EMERGENCY)
Facility: HOSPITAL | Age: 38
Discharge: HOME | End: 2018-02-13
Attending: PHYSICIAN ASSISTANT | Admitting: PHYSICIAN ASSISTANT
Payer: COMMERCIAL

## 2018-02-13 VITALS
RESPIRATION RATE: 18 BRPM | HEIGHT: 64 IN | TEMPERATURE: 97.5 F | DIASTOLIC BLOOD PRESSURE: 92 MMHG | SYSTOLIC BLOOD PRESSURE: 171 MMHG | OXYGEN SATURATION: 98 %

## 2018-02-13 DIAGNOSIS — R07.0 THROAT PAIN: ICD-10-CM

## 2018-02-13 DIAGNOSIS — B34.9 VIRAL SYNDROME: ICD-10-CM

## 2018-02-13 DIAGNOSIS — Z13.9 SCREENING FOR CONDITION: ICD-10-CM

## 2018-02-13 LAB
DEPRECATED S PYO AG THROAT QL EIA: NORMAL
FLUAV+FLUBV AG SPEC QL: NEGATIVE
FLUAV+FLUBV AG SPEC QL: NEGATIVE
SPECIMEN SOURCE: NORMAL
SPECIMEN SOURCE: NORMAL

## 2018-02-13 PROCEDURE — 87081 CULTURE SCREEN ONLY: CPT | Performed by: FAMILY MEDICINE

## 2018-02-13 PROCEDURE — G0463 HOSPITAL OUTPT CLINIC VISIT: HCPCS

## 2018-02-13 PROCEDURE — 87880 STREP A ASSAY W/OPTIC: CPT | Performed by: FAMILY MEDICINE

## 2018-02-13 PROCEDURE — 87804 INFLUENZA ASSAY W/OPTIC: CPT | Performed by: FAMILY MEDICINE

## 2018-02-13 PROCEDURE — 99213 OFFICE O/P EST LOW 20 MIN: CPT | Performed by: PHYSICIAN ASSISTANT

## 2018-02-13 ASSESSMENT — ENCOUNTER SYMPTOMS
COUGH: 0
ABDOMINAL PAIN: 0
SINUS PRESSURE: 0
FATIGUE: 1
DIZZINESS: 0
FEVER: 0
HEADACHES: 0
EYE DISCHARGE: 0
SORE THROAT: 1
VOICE CHANGE: 0
CARDIOVASCULAR NEGATIVE: 1
EYE REDNESS: 0
NECK STIFFNESS: 0
VOMITING: 0
PSYCHIATRIC NEGATIVE: 1
DIARRHEA: 0
APPETITE CHANGE: 1
NAUSEA: 0
LIGHT-HEADEDNESS: 0
TROUBLE SWALLOWING: 0
NECK PAIN: 0

## 2018-02-13 NOTE — ED AVS SNAPSHOT
[facility]    [address] [address]    Phone:  [phone]                                       Elisha Nunez   MRN: [id number]    Department:  [facility]   Date of Visit:  2/13/2018           After Visit Summary Signature Page     I have received my discharge instructions, and my questions have been answered. I have discussed any challenges I see with this plan with the nurse or doctor.    ..........................................................................................................................................  Patient/Patient Representative Signature      ..........................................................................................................................................  Patient Representative Print Name and Relationship to Patient    ..................................................               ................................................  Date                                            Time    ..........................................................................................................................................  Reviewed by Signature/Title    ...................................................              ..............................................  Date                                                            Time

## 2018-02-13 NOTE — ED PROVIDER NOTES
History     Chief Complaint   Patient presents with     Pharyngitis     Started on Saturday.     Flu Symptoms     Started with congestion last week, sound started on Sunday, fever started today.     The history is provided by the patient. No  was used.     Elisha Nunez is a 37 year old female who has 3 days of sore throat, body aches, congestion. Has decreased energy. No n/v/d. Has low fever. No ear/sinus pain/pressure. No rash. No change in b/b habits    Problem List:    Patient Active Problem List    Diagnosis Date Noted     Chronic suppurative otitis media of right ear 10/12/2017     Priority: Medium     Elevated liver enzymes 09/01/2015     Priority: Medium     ultrasound showed enlarged liver.           Past Medical History:    Past Medical History:   Diagnosis Date     Anxiety      Depression      Elevated liver enzymes 9/2015     Hearing loss        Past Surgical History:    Past Surgical History:   Procedure Laterality Date     GRAFT THIERSCH STATUS POST TYMPANOMASTOIDECTOMY Right 10/23/2017    Procedure: GRAFT THIERSCH STATUS POST TYMPANOMASTOIDECTOMY;  Right Removal of Granulation Tissue, Removal of Packing and Sutures, Thiersch Graft;  Surgeon: Stephan Crowley MD;  Location: [facility]     MASTOIDECTOMY Right 10/12/2017    Procedure: MASTOIDECTOMY;;  Surgeon: Stephan Crowley MD;  Location: [facility] OR     MEATOPLASTY EAR Right 10/12/2017    Procedure: MEATOPLASTY EAR;;  Surgeon: Stephan Crowley MD;  [facility]     MYRINGOTOMY, INSERT TUBE BILATERAL, COMBINED Bilateral 5/14/2015    Procedure: COMBINED MYRINGOTOMY, INSERT TUBE BILATERAL;  Surgeon: Stephan Crowley MD;  Location: [facility] OR     PE TUBES       TONSILLECTOMY       TYMPANOPLASTY Left 5/14/2015    Procedure: TYMPANOPLASTY;  Surgeon: Stephan Crowley MD;  [facility]     TYMPANOPLASTY Right 10/12/2017    Procedure: TYMPANOPLASTY;  Right Endural Flexiable Tympanoplasty, Middle Ear Reconstruction,Mastoidectomy,  "Myringotomy and Tube (#2 Paparella tube), Meatoplasty ;  Surgeon: Stephan Crowley MD;  Location: [facility] OR       Family History:    Not pertinent      Social History:  Marital Status:  Single [1]  Social History   Substance Use Topics     Smoking status: Never Smoker     Smokeless tobacco: Never Used     Alcohol use Yes      Comment: rare        Medications:      cetirizine (ZYRTEC) 10 MG tablet   oxyCODONE (ROXICODONE) 5 MG IR tablet   amoxicillin (AMOXIL) 875 MG tablet   DiphenhydrAMINE HCl (BENADRYL PO)   senna-docusate (SENOKOT-S;PERICOLACE) 8.6-50 MG per tablet   HYDROXYZINE HCL PO   etonogestrel-ethinyl estradiol (NUVARING) 0.12-0.015 MG/24HR vaginal ring   CITALOPRAM HYDROBROMIDE PO         Review of Systems   Constitutional: Positive for appetite change and fatigue. Negative for fever.   HENT: Positive for congestion and sore throat. Negative for ear pain, sinus pressure, trouble swallowing and voice change.    Eyes: Negative for discharge and redness.   Respiratory: Negative for cough.    Cardiovascular: Negative.    Gastrointestinal: Negative for abdominal pain, diarrhea, nausea and vomiting.   Genitourinary: Negative.    Musculoskeletal: Negative for neck pain and neck stiffness.   Skin: Negative for rash.   Neurological: Negative for dizziness, light-headedness and headaches.   Psychiatric/Behavioral: Negative.        Physical Exam   BP: 171/92  Heart Rate: 93  Temp: 97.5  F (36.4  C)  Resp: 18  Height: 162.6 cm (5' 4\")  SpO2: 98 %      Physical Exam   Constitutional: She is oriented to person, place, and time. She appears well-developed and well-nourished. No distress.   HENT:   Head: Normocephalic and atraumatic.   Right Ear: External ear normal.   Left Ear: External ear normal.   Mouth/Throat: Oropharynx is clear and moist.   Bilateral TMs/canals clear/wnl  No sinus TTP     Eyes: Conjunctivae and EOM are normal. Right eye exhibits no discharge. Left eye exhibits no discharge.   Neck: Normal range of " motion. Neck supple.   Cardiovascular: Normal rate, regular rhythm and normal heart sounds.    Pulmonary/Chest: Effort normal and breath sounds normal. No respiratory distress.   Abdominal: Soft. Bowel sounds are normal. She exhibits no distension. There is no tenderness.   Neurological: She is alert and oriented to person, place, and time.   Skin: Skin is warm and dry. No rash noted. She is not diaphoretic.   Psychiatric: She has a normal mood and affect.   Nursing note and vitals reviewed.      ED Course     ED Course     Procedures          Labs Ordered and Resulted from Time of ED Arrival Up to the Time of Departure from the ED   RAPID STREP SCREEN   INFLUENZA A/B ANTIGEN   BETA STREP GROUP A CULTURE       Assessments & Plan (with Medical Decision Making)     I have reviewed the nursing notes.    I have reviewed the findings, diagnosis, plan and need for follow up with the patient.    Final diagnoses:   Viral syndrome   Screening for condition - Influenza A/B negative   Throat pain - Rapid strep negative  Culture pending           Patient verbally educated and given appropriate education sheets for each of the diagnoses and has no questions.  Take OTC motrin or tylenol as directed on the bottle as needed.  Take prescription medications as directed.  Increase fluids, wash hands often.  Sleep in a recliner or with multiple pillows until this has resolved.  Follow up with your provider if symptoms increase or if concerns develop, return to the ER.  Court Oneill Certified   Physician Assistant  2/13/2018  7:33 PM  [facility]    2/13/2018   [facility]     Court Oneill PA  02/13/18 1933

## 2018-02-13 NOTE — ED NOTES
Patient presents with cough and sore throat X 3 days, fever, congestion and fatigue X 1 week and chills last night.  Throat and nasal swab done.

## 2018-02-13 NOTE — ED AVS SNAPSHOT
[facility]    [address]    [facility] [address]    Phone:  [phone]                                       Elisha Nunez   MRN: [id number]    Department:  [facility]   Date of Visit:  2/13/2018           Patient Information     Date Of Birth          1980        Your diagnoses for this visit were:     Viral syndrome     Screening for condition Influenza A/B negative    Throat pain Rapid strep negative  Culture pending       You were seen by Court Oneill PA.      Follow-up Information     Follow up with Julia Beltran.    Why:  If symptoms worsen    Contact information:    [facility]  [address] [place] [address] [phone]          Follow up with [facility].    Specialty:  EMERGENCY MEDICINE    Why:  If further concerns develop    Contact information:    [address]  [facility] [address] [phone]    Additional information:    From [place]: Take [address]. Turn left at [address]. Turn left at the first stoplight on [address]. At the first stop sign, take a right onto [address]. Take a left into the parking lot and continue through until you reach the North enterance of the building.       From [place]: Take [address]. Take the [address] ramp towards [place]. Turn left onto [address]. Take a slight right onto [address]. Turn left at the first stoplight on [address]. At the first stop sign, take a right onto [address]. Take a left into the parking lot and continue through until you reach the North enterance of the building.       From [place]: Take [address]. Take a right at [address]. At the first stop sign, take a right onto [address]. Take a left into the parking lot and continue through until you reach the North enterance of the building.       Discharge References/Attachments     VIRAL SYNDROME (ADULT)  (ENGLISH)    SORE THROATS, SELF-CARE FOR (ENGLISH)         Review of your medicines      Our records show that you are taking the medicines listed below. If these are incorrect, please call your family doctor or clinic.        Dose / Directions Last dose taken    amoxicillin 875 MG tablet   Commonly known as:  AMOXIL   Dose:  875 mg   Quantity:  20 tablet        Take 1 tablet (875 mg) by mouth 2 times daily   Refills:  0        BENADRYL PO   Dose:  50 mg        Take 50 mg by mouth nightly as needed   Refills:  0        cetirizine 10 MG tablet   Commonly known as:  zyrTEC   Dose:  10 mg        Take 10 mg by mouth daily   Refills:  0        CITALOPRAM HYDROBROMIDE PO   Dose:  40 mg        Take 40 mg by mouth daily   Refills:  0        etonogestrel-ethinyl estradiol 0.12-0.015 MG/24HR vaginal ring   Commonly known as:  NUVARING   Dose:  1 each        Place 1 each vaginally every 28 days Place for 3 weeks and then remove for one week   Refills:  0        HYDROXYZINE HCL PO   Dose:  25 mg        Take 25 mg by mouth daily as needed for itching   Refills:  0        oxyCODONE IR 5 MG tablet   Commonly known as:  ROXICODONE   Dose:  5-10 mg   Quantity:  30 tablet        Take 1-2 tablets (5-10 mg) by mouth every 6 hours as needed for pain or other (Moderate to Severe)   Refills:  0        senna-docusate 8.6-50 MG per tablet   Commonly known as:  SENOKOT-S;PERICOLACE   Dose:  1-2 tablet   Quantity:  30 tablet        Take 1-2 tablets by mouth 2 times daily Take while on oral narcotics to prevent or treat constipation.   Refills:  0                Procedures and tests performed during your visit     Beta strep group A culture    Influenza A/B antigen    Rapid strep screen      Orders Needing Specimen Collection     None      Pending Results     Date and Time Order Name Status Description    2/13/2018 1718 Beta strep group A culture In process             Pending Culture Results     Date and Time Order Name Status Description     "2018 1718 Beta strep group A culture In process             Thank you for choosing [facility]       Thank you for choosing [facility] for your care. Our goal is always to provide you with excellent care. Hearing back from our patients is one way we can continue to improve our services. Please take a few minutes to complete the written survey that you may receive in the mail after you visit with us. Thank you!        [employer] Information     [employer] lets you send messages to your doctor, view your test results, renew your prescriptions, schedule appointments and more. To sign up, go to [email or web address] . Click on \"Log in\" on the left side of the screen, which will take you to the Welcome page. Then click on \"Sign up Now\" on the right side of the page.     You will be asked to enter the access code listed below, as well as some personal information. Please follow the directions to create your username and password.     Your access code is: [email or web address]  Expires: 2018  6:14 PM     Your access code will  in 90 days. If you need help or a new code, please call your [facility] clinic or [phone].        Care EveryWhere ID     This is your Care EveryWhere ID. This could be used by other organizations to access your [facility] medical records  [id number]        Equal Access to Services     CYN JAMES : Axel Son, waaxda luqadaha, qaybta kaalmada mauricio, jailene suazo . So [facility] [phone].    ATENCIÓN: Si habla español, tiene a sharif disposición servicios gratuitos de asistencia lingüística. Llame al [phone].    We comply with applicable federal civil rights laws and [place] laws. We do not discriminate on the basis of race, color, national origin, age, disability, sex, sexual orientation, or gender identity.            After Visit Summary       This is your record. Keep this with you and show to your community pharmacist(s) and doctor(s) at your " next visit.

## 2018-02-15 LAB
BACTERIA SPEC CULT: NORMAL
SPECIMEN SOURCE: NORMAL

## 2018-03-01 ENCOUNTER — HOSPITAL ENCOUNTER (EMERGENCY)
Facility: HOSPITAL | Age: 38
Discharge: HOME | End: 2018-03-01
Attending: NURSE PRACTITIONER | Admitting: NURSE PRACTITIONER
Payer: COMMERCIAL

## 2018-03-01 VITALS
DIASTOLIC BLOOD PRESSURE: 77 MMHG | HEART RATE: 84 BPM | OXYGEN SATURATION: 97 % | SYSTOLIC BLOOD PRESSURE: 163 MMHG | TEMPERATURE: 98.3 F | RESPIRATION RATE: 16 BRPM

## 2018-03-01 DIAGNOSIS — K13.70 LESION OF UVULA: ICD-10-CM

## 2018-03-01 DIAGNOSIS — H92.11 OTORRHEA, RIGHT: ICD-10-CM

## 2018-03-01 DIAGNOSIS — J22 LOWER RESPIRATORY INFECTION (E.G., BRONCHITIS, PNEUMONIA, PNEUMONITIS, PULMONITIS): ICD-10-CM

## 2018-03-01 DIAGNOSIS — J01.90 ACUTE SINUSITIS WITH SYMPTOMS > 10 DAYS: ICD-10-CM

## 2018-03-01 DIAGNOSIS — J02.9 ACUTE PHARYNGITIS, UNSPECIFIED ETIOLOGY: ICD-10-CM

## 2018-03-01 PROCEDURE — G0463 HOSPITAL OUTPT CLINIC VISIT: HCPCS

## 2018-03-01 PROCEDURE — 99214 OFFICE O/P EST MOD 30 MIN: CPT | Performed by: NURSE PRACTITIONER

## 2018-03-01 PROCEDURE — 25000125 ZZHC RX 250: Performed by: NURSE PRACTITIONER

## 2018-03-01 PROCEDURE — 40000275 ZZH STATISTIC RCP TIME EA 10 MIN

## 2018-03-01 PROCEDURE — G0463 HOSPITAL OUTPT CLINIC VISIT: HCPCS | Mod: 25

## 2018-03-01 PROCEDURE — 94664 DEMO&/EVAL PT USE INHALER: CPT

## 2018-03-01 PROCEDURE — 87804 INFLUENZA ASSAY W/OPTIC: CPT | Mod: 59 | Performed by: FAMILY MEDICINE

## 2018-03-01 PROCEDURE — 87880 STREP A ASSAY W/OPTIC: CPT | Performed by: FAMILY MEDICINE

## 2018-03-01 PROCEDURE — 94640 AIRWAY INHALATION TREATMENT: CPT

## 2018-03-01 PROCEDURE — 87081 CULTURE SCREEN ONLY: CPT | Performed by: FAMILY MEDICINE

## 2018-03-01 RX ORDER — IPRATROPIUM BROMIDE AND ALBUTEROL SULFATE 2.5; .5 MG/3ML; MG/3ML
3 SOLUTION RESPIRATORY (INHALATION) ONCE
Status: COMPLETED | OUTPATIENT
Start: 2018-03-01 | End: 2018-03-01

## 2018-03-01 RX ORDER — BENZONATATE 200 MG/1
200 CAPSULE ORAL 3 TIMES DAILY PRN
Qty: 21 CAPSULE | Refills: 0 | Status: SHIPPED | OUTPATIENT
Start: 2018-03-01 | End: 2019-05-08

## 2018-03-01 RX ORDER — CODEINE PHOSPHATE AND GUAIFENESIN 10; 100 MG/5ML; MG/5ML
1-2 SOLUTION ORAL EVERY 4 HOURS PRN
Qty: 120 ML | Refills: 0 | Status: SHIPPED | OUTPATIENT
Start: 2018-03-01 | End: 2019-05-08

## 2018-03-01 RX ADMIN — IPRATROPIUM BROMIDE AND ALBUTEROL SULFATE 3 ML: .5; 3 SOLUTION RESPIRATORY (INHALATION) at 12:06

## 2018-03-01 ASSESSMENT — ENCOUNTER SYMPTOMS
SORE THROAT: 1
FEVER: 1
ARTHRALGIAS: 1
FATIGUE: 1
SINUS PAIN: 1
CHILLS: 1
COUGH: 1
HEADACHES: 1
MYALGIAS: 1
APPETITE CHANGE: 1
SHORTNESS OF BREATH: 1
SINUS PRESSURE: 1

## 2018-03-01 NOTE — ED PROVIDER NOTES
History     Chief Complaint   Patient presents with     Pharyngitis     The history is provided by the patient. No  was used.     Elisha Nunez is a 37 year old female who presents today with a CC of sore throat, sinus congestion, sinus pressure, headache, chills, fatigue, cough, shortness of breath x 3+ weeks.  She started with low grade fevers in the past week.  She denies PMH of asthma.  No chest pain.  She has been taking dayquil, nyquil, motrin, tylenol for symptoms with minimal relief.      Problem List:    Patient Active Problem List    Diagnosis Date Noted     Chronic suppurative otitis media of right ear 10/12/2017     Priority: Medium     Elevated liver enzymes 09/01/2015     Priority: Medium     ultrasound showed enlarged liver.           Past Medical History:    Past Medical History:   Diagnosis Date     Anxiety      Depression      Elevated liver enzymes 9/2015     Hearing loss        Past Surgical History:    Past Surgical History:   Procedure Laterality Date     GRAFT THIERSCH STATUS POST TYMPANOMASTOIDECTOMY Right 10/23/2017    Procedure: GRAFT THIERSCH STATUS POST TYMPANOMASTOIDECTOMY;  Right Removal of Granulation Tissue, Removal of Packing and Sutures, Thiersch Graft;  Surgeon: Stephan Crowley MD;  [facility]     MASTOIDECTOMY Right 10/12/2017    Procedure: MASTOIDECTOMY;;  Surgeon: Stephan Crowley MD;  [facility]     MEATOPLASTY EAR Right 10/12/2017    Procedure: MEATOPLASTY EAR;;  Surgeon: Stephan Crowley MD;  [facility]     MYRINGOTOMY, INSERT TUBE BILATERAL, COMBINED Bilateral 5/14/2015    Procedure: COMBINED MYRINGOTOMY, INSERT TUBE BILATERAL;  Surgeon: Stephan Crowley MD;  [facility]     PE TUBES       TONSILLECTOMY       TYMPANOPLASTY Left 5/14/2015    Procedure: TYMPANOPLASTY;  Surgeon: Stephan Crowley MD;  [facility]     TYMPANOPLASTY Right 10/12/2017    Procedure: TYMPANOPLASTY;  Right Endural Flexiable Tympanoplasty,  Middle Ear Reconstruction,Mastoidectomy, Myringotomy and Tube (#2 Paparella tube), Meatoplasty ;  Surgeon: Stephan Crowley MD;  [facility]       Family History:    No family history on file.    Social History:  Marital Status:  Single [1]  Social History   Substance Use Topics     Smoking status: Never Smoker     Smokeless tobacco: Never Used     Alcohol use Yes      Comment: rare        Medications:      amoxicillin-clavulanate (AUGMENTIN) 875-125 MG per tablet   Ipratropium-Albuterol (COMBIVENT RESPIMAT)  MCG/ACT inhaler   benzonatate (TESSALON) 200 MG capsule   guaiFENesin-codeine (ROBITUSSIN AC) 100-10 MG/5ML SOLN solution   cetirizine (ZYRTEC) 10 MG tablet   DiphenhydrAMINE HCl (BENADRYL PO)   HYDROXYZINE HCL PO   etonogestrel-ethinyl estradiol (NUVARING) 0.12-0.015 MG/24HR vaginal ring   CITALOPRAM HYDROBROMIDE PO   senna-docusate (SENOKOT-S;PERICOLACE) 8.6-50 MG per tablet         Review of Systems   Constitutional: Positive for appetite change (comes and goes), chills, fatigue and fever.   HENT: Positive for congestion, ear pain (right, has a history of middle ear reconstructive surgery and PE tube), sinus pain, sinus pressure and sore throat.    Respiratory: Positive for cough and shortness of breath.    Musculoskeletal: Positive for arthralgias and myalgias.   Skin: Negative for rash.   Neurological: Positive for headaches.       Physical Exam   BP: 163/77  Pulse: 84  Temp: 98.3  F (36.8  C)  Resp: 16  SpO2: 98 %      Physical Exam   Constitutional: She is oriented to person, place, and time. Vital signs are normal. She appears well-developed. She is cooperative.  Non-toxic appearance. No distress.   HENT:   Head: Normocephalic and atraumatic.   Right Ear: There is drainage (PE tube with serous drainage).   Nose: Mucosal edema present. Right sinus exhibits maxillary sinus tenderness. Left sinus exhibits maxillary sinus tenderness.   Mouth/Throat: Uvula is midline. No trismus in the jaw. No  uvula swelling (approximately 0.5 cm x 0.25 cm dark red non-raised lesion noted on tip of uvula). Posterior oropharyngeal erythema present. No posterior oropharyngeal edema.       Left ear with surgical changes, green PE tube noted   Eyes: Conjunctivae are normal.   Neck: Normal range of motion. Neck supple.   Cardiovascular: Normal rate and regular rhythm.    Pulmonary/Chest: Effort normal and breath sounds normal.   Lymphadenopathy:     She has no cervical adenopathy.   Neurological: She is alert and oriented to person, place, and time.   Skin: Skin is warm and dry.   Psychiatric: She has a normal mood and affect. Her behavior is normal.   Nursing note and vitals reviewed.      ED Course     ED Course     Procedures    Results for orders placed or performed during the hospital encounter of 03/01/18   Rapid strep screen   Result Value Ref Range    Specimen Description Throat     Rapid Strep A Screen       NEGATIVE: No Group A streptococcal antigen detected by immunoassay, await culture report.   Influenza A/B antigen   Result Value Ref Range    Influenza A/B Agn Specimen Nasopharyngeal     Influenza A Negative NEG^Negative    Influenza B Negative NEG^Negative   Beta strep group A culture   Result Value Ref Range    Specimen Description Throat     Culture Micro Culture negative monitoring continues      Medications   ipratropium - albuterol 0.5 mg/2.5 mg/3 mL (DUONEB) neb solution 3 mL (3 mLs Nebulization Given 3/1/18 1206)     Improved air movement post neb  Assessments & Plan (with Medical Decision Making)     I have reviewed the nursing notes.    I have reviewed the findings, diagnosis, plan and need for follow up with the patient.  ASSESSMENT / PLAN:  (J01.90) Acute sinusitis with symptoms > 10 days  Comment: symptomatic  Plan:   Augmentin as prescribed   Patient verbally educated and given appropriate education sheets for each of their diagnoses and has no questions.   Symptomatic treatments such as those  "listed below are recommended as needed:   Take OTC motrin or tylenol as directed on the bottle as needed.   Cool mist humidifier at bedside    May try safely elevating HOB or sleeping in recliner   Take OTC cold medicine as directed on bottle - check ingredients, many are multi symptom and may contain tylenol or motrin   Frequent sips of non-caffeine fluids, rest, wash hands often   Sinus rinses such as a netti pot may help with sinus symptoms   Return to ED/UC if symptoms worsen or concerns develop: shortness of breath,     chest pain, unable to control fever < 103 with medications, persistent vomiting, signs/symptoms of dehydration.   If symptoms persist follow up with PCP for re-evaluation.      (J02.9) Acute pharyngitis, unspecified etiology  Comment: rapid strep neg  Plan:  The rapid strep was negative, the strep culture is pending, we will call you with positive results only and treat with antibiotics as needed   Warm salt water gargles several times per day   Ibuprofen and tylenol per package directions for pain/fever   Cepacol lozenges OTC per package directions   Increase fluids, wash hands frequently, rest   Patient verbally educated and given appropriate education sheets for their diagnoses and has no questions.   Return to ED/UC if symptoms increase or concerns develop, red flag symptoms as discussed and per discharge instructions, increasing throat pain, trouble swallowing,  \"hot potato voice\", drooling, shortness of breath/airway compromise   Follow up with your Primary Care provider if symptoms do not improve in 2-3 days    (K13.70) Lesion of uvula  Comment: red non-raised lesion tip of uvula  Plan:  Patient has an appointment with ENT provider coming up, will have him re-check on next appointment    (J22) Lower respiratory infection (e.g., bronchitis, pneumonia, pneumonitis, pulmonitis)  Comment: symptomatic x 3+ weeks  Plan:  See above   Combivent as prescribed   Robitussin AC as " prescribed   Tessalon as prescribed    (H92.11) Otorrhea, right  Comment: patient reports chronic otorrhea, has appointment with ENT provider coming up  Plan:  Follow up with ENT provider first available appointment      Discharge Medication List as of 3/1/2018 12:31 PM      START taking these medications    Details   amoxicillin-clavulanate (AUGMENTIN) 875-125 MG per tablet Take 1 tablet by mouth 2 times daily for 10 days, Disp-20 tablet, R-0, E-Prescribe      Ipratropium-Albuterol (COMBIVENT RESPIMAT)  MCG/ACT inhaler Inhale 1 puff into the lungs 4 times daily, Disp-1 Inhaler, R-1, E-Prescribe      benzonatate (TESSALON) 200 MG capsule Take 1 capsule (200 mg) by mouth 3 times daily as needed for cough, Disp-21 capsule, R-0, E-Prescribe      guaiFENesin-codeine (ROBITUSSIN AC) 100-10 MG/5ML SOLN solution Take 5-10 mLs by mouth every 4 hours as needed for cough, Disp-120 mL, R-0, Local Print             Final diagnoses:   Acute sinusitis with symptoms > 10 days   Acute pharyngitis, unspecified etiology   Lesion of uvula - red non-raised lesion tip of uvula   Lower respiratory infection (e.g., bronchitis, pneumonia, pneumonitis, pulmonitis)   Otorrhea, right       3/1/2018   [facility]     Marleen Paul NP  03/02/18 [id number]

## 2018-03-01 NOTE — ED NOTES
Pt has a sore throat for 3 weeks,headache, fever- highest being 99.4 that started on Saturday, chills, SOB, cough bringing up a white colored phlegm, muscle aches.

## 2018-03-01 NOTE — ED AVS SNAPSHOT
[facility]    [address] [address]    Phone:  [phone]                                       Elisha Nunez   MRN: [id number]    Department:  [facility]   Date of Visit:  3/1/2018           Patient Information     Date Of Birth          1980        Your diagnoses for this visit were:     Acute sinusitis with symptoms > 10 days     Acute pharyngitis, unspecified etiology     Lesion of uvula red non-raised lesion tip of uvula    Lower respiratory infection (e.g., bronchitis, pneumonia, pneumonitis, pulmonitis)     Otorrhea, right        You were seen by Marleen Paul, NP.      Follow-up Information     Follow up with [facility].    Specialty:  EMERGENCY MEDICINE    Why:  As needed, If symptoms worsen, or concerns develop    Contact information:    [address] [address] [phone]    Additional information:    From [place]: Take [address]. Turn left at [address]. Turn left at the first stoplight on [address]. At the first stop sign, take a right onto [address]. Take a left into the parking lot and continue through until you reach the North enterance of the building.       From [place]: Take [address]. Take the [address] ramp towards [place]. Turn left onto [address]. Take a slight right onto [address]. Turn left at the first stoplight on [address]. At the first stop sign, take a right onto [address]. Take a left into the parking lot and continue through until you reach the North enterance of the building.       From [place]: Take [address]. Take a right at [address]. At the first stop sign, take a right onto [address]. Take a left into the parking lot and continue through until you reach the North enterance of the building.         Follow up with Julia Beltran.    Why:  As needed, if symptoms do not  improve    Contact information:    [facility]  [address] [place] [address]  [phone]        Discharge References/Attachments     SINUSITIS (ANTIBIOTIC TREATMENT) (ENGLISH)         Review of your medicines      START taking        Dose / Directions Last dose taken    amoxicillin-clavulanate 875-125 MG per tablet   Commonly known as:  AUGMENTIN   Dose:  1 tablet   Quantity:  20 tablet        Take 1 tablet by mouth 2 times daily for 10 days   Refills:  0        benzonatate 200 MG capsule   Commonly known as:  TESSALON   Dose:  200 mg   Quantity:  21 capsule        Take 1 capsule (200 mg) by mouth 3 times daily as needed for cough   Refills:  0        guaiFENesin-codeine 100-10 MG/5ML Soln solution   Commonly known as:  ROBITUSSIN AC   Dose:  1-2 tsp.   Quantity:  120 mL        Take 5-10 mLs by mouth every 4 hours as needed for cough   Refills:  0        Ipratropium-Albuterol  MCG/ACT inhaler   Commonly known as:  COMBIVENT RESPIMAT   Dose:  1 puff   Quantity:  1 Inhaler        Inhale 1 puff into the lungs 4 times daily   Refills:  1          Our records show that you are taking the medicines listed below. If these are incorrect, please call your family doctor or clinic.        Dose / Directions Last dose taken    BENADRYL PO   Dose:  50 mg        Take 50 mg by mouth nightly as needed   Refills:  0        cetirizine 10 MG tablet   Commonly known as:  zyrTEC   Dose:  10 mg        Take 10 mg by mouth daily   Refills:  0        CITALOPRAM HYDROBROMIDE PO   Dose:  40 mg        Take 40 mg by mouth daily   Refills:  0        etonogestrel-ethinyl estradiol 0.12-0.015 MG/24HR vaginal ring   Commonly known as:  NUVARING   Dose:  1 each        Place 1 each vaginally every 28 days Place for 3 weeks and then remove for one week   Refills:  0        HYDROXYZINE HCL PO   Dose:  25 mg        Take 25 mg by mouth daily as needed for itching   Refills:  0        senna-docusate 8.6-50 MG per tablet  "  Commonly known as:  SENOKOT-S;PERICOLACE   Dose:  1-2 tablet   Quantity:  30 tablet        Take 1-2 tablets by mouth 2 times daily Take while on oral narcotics to prevent or treat constipation.   Refills:  0                Prescriptions were sent or printed at these locations (4 Prescriptions)                   [id number] - [place], [place] - [address] AT [facility] [address]   [address], [place] [address]    Telephone:  [phone]   Fax:  [phone]   Hours:                  E-Prescribed (3 of 4)         amoxicillin-clavulanate (AUGMENTIN) 875-125 MG per tablet               Ipratropium-Albuterol (COMBIVENT RESPIMAT)  MCG/ACT inhaler               benzonatate (TESSALON) 200 MG capsule                 Printed at Department/Unit printer (1 of 4)         guaiFENesin-codeine (ROBITUSSIN AC) 100-10 MG/5ML SOLN solution                Procedures and tests performed during your visit     Beta strep group A culture    Influenza A/B antigen    Rapid strep screen      Orders Needing Specimen Collection     None      Pending Results     Date and Time Order Name Status Description    3/1/2018 1053 Beta strep group A culture In process             Pending Culture Results     Date and Time Order Name Status Description    3/1/2018 1053 Beta strep group A culture In process             Thank you for choosing [facility]       Thank you for choosing [facility] for your care. Our goal is always to provide you with excellent care. Hearing back from our patients is one way we can continue to improve our services. Please take a few minutes to complete the written survey that you may receive in the mail after you visit with us. Thank you!        [employer] Information     [employer] lets you send messages to your doctor, view your test results, renew your prescriptions, schedule appointments and more. To sign up, go to [email or web address] . Click on \"Log in\" on the left side of the screen, which will " "take you to the Welcome page. Then click on \"Sign up Now\" on the right side of the page.     You will be asked to enter the access code listed below, as well as some personal information. Please follow the directions to create your username and password.     Your access code is: [email or web address]  Expires: 2018  6:14 PM     Your access code will  in 90 days. If you need help or a new code, please call your [facility] clinic or [phone].        Care EveryWhere ID     This is your Care EveryWhere ID. This could be used by other organizations to access your [facility] medical records  [id number]        Equal Access to Services     CYN JAMES : Axel Son, adwoa ureña, tessa martínez, jailene oneill. So [facility] [phone].    ATENCIÓN: Si habla español, tiene a sharif disposición servicios gratuitos de asistencia lingüística. Llame al [phone].    We comply with applicable federal civil rights laws and [place] laws. We do not discriminate on the basis of race, color, national origin, age, disability, sex, sexual orientation, or gender identity.            After Visit Summary       This is your record. Keep this with you and show to your community pharmacist(s) and doctor(s) at your next visit.                  "

## 2018-03-01 NOTE — ED AVS SNAPSHOT
[facility]    [address] [address]    Phone:  [phone]                                       Elisha Nunez   MRN: [id number]    Department:  [facility]   Date of Visit:  3/1/2018           After Visit Summary Signature Page     I have received my discharge instructions, and my questions have been answered. I have discussed any challenges I see with this plan with the nurse or doctor.    ..........................................................................................................................................  Patient/Patient Representative Signature      ..........................................................................................................................................  Patient Representative Print Name and Relationship to Patient    ..................................................               ................................................  Date                                            Time    ..........................................................................................................................................  Reviewed by Signature/Title    ...................................................              ..............................................  Date                                                            Time

## 2018-03-03 LAB
BACTERIA SPEC CULT: NORMAL
SPECIMEN SOURCE: NORMAL

## 2018-07-31 ENCOUNTER — TRANSFERRED RECORDS (OUTPATIENT)
Dept: HEALTH INFORMATION MANAGEMENT | Facility: CLINIC | Age: 38
End: 2018-07-31

## 2018-09-10 ENCOUNTER — TRANSFERRED RECORDS (OUTPATIENT)
Dept: HEALTH INFORMATION MANAGEMENT | Facility: CLINIC | Age: 38
End: 2018-09-10

## 2018-09-10 LAB
CREAT SERPL-MCNC: 0.78 MG/DL (ref 0.7–1.2)
GFR SERPL CREATININE-BSD FRML MDRD: >60 ML/MIN/1.73M2
GLUCOSE SERPL-MCNC: 104 MG/DL (ref 60–99)
HBA1C MFR BLD: 5.4 % (ref 4–8)
POTASSIUM SERPL-SCNC: 4.5 MEQ/L (ref 3.5–5.1)

## 2018-09-17 ENCOUNTER — ANESTHESIA EVENT (OUTPATIENT)
Dept: SURGERY | Facility: CLINIC | Age: 38
End: 2018-09-17
Payer: COMMERCIAL

## 2018-09-17 ENCOUNTER — HOSPITAL ENCOUNTER (OUTPATIENT)
Facility: CLINIC | Age: 38
Discharge: HOME | End: 2018-09-17
Attending: OTOLARYNGOLOGY | Admitting: OTOLARYNGOLOGY
Payer: COMMERCIAL

## 2018-09-17 ENCOUNTER — ANESTHESIA (OUTPATIENT)
Dept: SURGERY | Facility: CLINIC | Age: 38
End: 2018-09-17
Payer: COMMERCIAL

## 2018-09-17 VITALS
OXYGEN SATURATION: 97 % | TEMPERATURE: 98.1 F | SYSTOLIC BLOOD PRESSURE: 119 MMHG | WEIGHT: 255.29 LBS | RESPIRATION RATE: 14 BRPM | DIASTOLIC BLOOD PRESSURE: 67 MMHG | BODY MASS INDEX: 45.23 KG/M2 | HEIGHT: 63 IN

## 2018-09-17 DIAGNOSIS — H66.3X1 CHRONIC SUPPURATIVE OTITIS MEDIA OF RIGHT EAR, UNSPECIFIED OTITIS MEDIA LOCATION: Primary | Chronic | ICD-10-CM

## 2018-09-17 LAB
GLUCOSE BLDC GLUCOMTR-MCNC: 87 MG/DL (ref 70–99)
HCG UR QL: NEGATIVE

## 2018-09-17 PROCEDURE — 25000132 ZZH RX MED GY IP 250 OP 250 PS 637: Performed by: OTOLARYNGOLOGY

## 2018-09-17 PROCEDURE — 37000008 ZZH ANESTHESIA TECHNICAL FEE, 1ST 30 MIN: Performed by: OTOLARYNGOLOGY

## 2018-09-17 PROCEDURE — 71000014 ZZH RECOVERY PHASE 1 LEVEL 2 FIRST HR: Performed by: OTOLARYNGOLOGY

## 2018-09-17 PROCEDURE — 25000125 ZZHC RX 250: Performed by: NURSE ANESTHETIST, CERTIFIED REGISTERED

## 2018-09-17 PROCEDURE — 25000132 ZZH RX MED GY IP 250 OP 250 PS 637: Performed by: ANESTHESIOLOGY

## 2018-09-17 PROCEDURE — 25000125 ZZHC RX 250: Performed by: OTOLARYNGOLOGY

## 2018-09-17 PROCEDURE — 25000128 H RX IP 250 OP 636: Performed by: NURSE ANESTHETIST, CERTIFIED REGISTERED

## 2018-09-17 PROCEDURE — 82962 GLUCOSE BLOOD TEST: CPT

## 2018-09-17 PROCEDURE — 25000128 H RX IP 250 OP 636: Performed by: ANESTHESIOLOGY

## 2018-09-17 PROCEDURE — 40000170 ZZH STATISTIC PRE-PROCEDURE ASSESSMENT II: Performed by: OTOLARYNGOLOGY

## 2018-09-17 PROCEDURE — 81025 URINE PREGNANCY TEST: CPT | Performed by: ANESTHESIOLOGY

## 2018-09-17 PROCEDURE — 25000128 H RX IP 250 OP 636: Performed by: OTOLARYNGOLOGY

## 2018-09-17 PROCEDURE — 71000027 ZZH RECOVERY PHASE 2 EACH 15 MINS: Performed by: OTOLARYNGOLOGY

## 2018-09-17 PROCEDURE — 36000062 ZZH SURGERY LEVEL 4 1ST 30 MIN - UMMC: Performed by: OTOLARYNGOLOGY

## 2018-09-17 PROCEDURE — 37000009 ZZH ANESTHESIA TECHNICAL FEE, EACH ADDTL 15 MIN: Performed by: OTOLARYNGOLOGY

## 2018-09-17 PROCEDURE — 36000064 ZZH SURGERY LEVEL 4 EA 15 ADDTL MIN - UMMC: Performed by: OTOLARYNGOLOGY

## 2018-09-17 PROCEDURE — 27210794 ZZH OR GENERAL SUPPLY STERILE: Performed by: OTOLARYNGOLOGY

## 2018-09-17 PROCEDURE — 25000125 ZZHC RX 250: Performed by: ANESTHESIOLOGY

## 2018-09-17 RX ORDER — SCOLOPAMINE TRANSDERMAL SYSTEM 1 MG/1
1 PATCH, EXTENDED RELEASE TRANSDERMAL ONCE
Status: COMPLETED | OUTPATIENT
Start: 2018-09-17 | End: 2018-09-17

## 2018-09-17 RX ORDER — MAGNESIUM HYDROXIDE 1200 MG/15ML
LIQUID ORAL PRN
Status: DISCONTINUED | OUTPATIENT
Start: 2018-09-17 | End: 2018-09-17 | Stop reason: HOSPADM

## 2018-09-17 RX ORDER — SODIUM CHLORIDE, SODIUM LACTATE, POTASSIUM CHLORIDE, CALCIUM CHLORIDE 600; 310; 30; 20 MG/100ML; MG/100ML; MG/100ML; MG/100ML
INJECTION, SOLUTION INTRAVENOUS CONTINUOUS
Status: DISCONTINUED | OUTPATIENT
Start: 2018-09-17 | End: 2018-09-17 | Stop reason: HOSPADM

## 2018-09-17 RX ORDER — EPHEDRINE SULFATE 50 MG/ML
INJECTION, SOLUTION INTRAMUSCULAR; INTRAVENOUS; SUBCUTANEOUS PRN
Status: DISCONTINUED | OUTPATIENT
Start: 2018-09-17 | End: 2018-09-17

## 2018-09-17 RX ORDER — ONDANSETRON 4 MG/1
4 TABLET, ORALLY DISINTEGRATING ORAL EVERY 30 MIN PRN
Status: DISCONTINUED | OUTPATIENT
Start: 2018-09-17 | End: 2018-09-17 | Stop reason: HOSPADM

## 2018-09-17 RX ORDER — OXYCODONE HYDROCHLORIDE 10 MG/1
5 TABLET ORAL EVERY 6 HOURS PRN
Qty: 12 TABLET | Refills: 0 | Status: SHIPPED | OUTPATIENT
Start: 2018-09-17 | End: 2019-05-08

## 2018-09-17 RX ORDER — CEFAZOLIN SODIUM 1 G/50ML
3 SOLUTION INTRAVENOUS
Status: COMPLETED | OUTPATIENT
Start: 2018-09-17 | End: 2018-09-17

## 2018-09-17 RX ORDER — GLYCOPYRROLATE 0.2 MG/ML
INJECTION, SOLUTION INTRAMUSCULAR; INTRAVENOUS PRN
Status: DISCONTINUED | OUTPATIENT
Start: 2018-09-17 | End: 2018-09-17

## 2018-09-17 RX ORDER — DEXAMETHASONE SODIUM PHOSPHATE 4 MG/ML
INJECTION, SOLUTION INTRA-ARTICULAR; INTRALESIONAL; INTRAMUSCULAR; INTRAVENOUS; SOFT TISSUE PRN
Status: DISCONTINUED | OUTPATIENT
Start: 2018-09-17 | End: 2018-09-17

## 2018-09-17 RX ORDER — ONDANSETRON 2 MG/ML
INJECTION INTRAMUSCULAR; INTRAVENOUS PRN
Status: DISCONTINUED | OUTPATIENT
Start: 2018-09-17 | End: 2018-09-17

## 2018-09-17 RX ORDER — FENTANYL CITRATE 50 UG/ML
INJECTION, SOLUTION INTRAMUSCULAR; INTRAVENOUS PRN
Status: DISCONTINUED | OUTPATIENT
Start: 2018-09-17 | End: 2018-09-17

## 2018-09-17 RX ORDER — GINSENG 100 MG
CAPSULE ORAL PRN
Status: DISCONTINUED | OUTPATIENT
Start: 2018-09-17 | End: 2018-09-17 | Stop reason: HOSPADM

## 2018-09-17 RX ORDER — PROPOFOL 10 MG/ML
INJECTION, EMULSION INTRAVENOUS CONTINUOUS PRN
Status: DISCONTINUED | OUTPATIENT
Start: 2018-09-17 | End: 2018-09-17

## 2018-09-17 RX ORDER — MEPERIDINE HYDROCHLORIDE 25 MG/ML
12.5 INJECTION INTRAMUSCULAR; INTRAVENOUS; SUBCUTANEOUS
Status: DISCONTINUED | OUTPATIENT
Start: 2018-09-17 | End: 2018-09-17 | Stop reason: HOSPADM

## 2018-09-17 RX ORDER — ACETAMINOPHEN 325 MG/1
650 TABLET ORAL EVERY 4 HOURS PRN
Qty: 100 TABLET | Refills: 0 | Status: ON HOLD | COMMUNITY
Start: 2018-09-17 | End: 2019-05-09

## 2018-09-17 RX ORDER — EPINEPHRINE NASAL SOLUTION 1 MG/ML
SOLUTION NASAL PRN
Status: DISCONTINUED | OUTPATIENT
Start: 2018-09-17 | End: 2018-09-17 | Stop reason: HOSPADM

## 2018-09-17 RX ORDER — SULFACETAMIDE SODIUM AND PREDNISOLONE SODIUM PHOSPHATE 100; 2.3 MG/ML; MG/ML
SOLUTION/ DROPS OPHTHALMIC PRN
Status: DISCONTINUED | OUTPATIENT
Start: 2018-09-17 | End: 2018-09-17 | Stop reason: HOSPADM

## 2018-09-17 RX ORDER — BACITRACIN 500 [USP'U]/G
OINTMENT OPHTHALMIC PRN
Status: DISCONTINUED | OUTPATIENT
Start: 2018-09-17 | End: 2018-09-17 | Stop reason: HOSPADM

## 2018-09-17 RX ORDER — PROPOFOL 10 MG/ML
INJECTION, EMULSION INTRAVENOUS PRN
Status: DISCONTINUED | OUTPATIENT
Start: 2018-09-17 | End: 2018-09-17

## 2018-09-17 RX ORDER — FENTANYL CITRATE 50 UG/ML
25-50 INJECTION, SOLUTION INTRAMUSCULAR; INTRAVENOUS
Status: DISCONTINUED | OUTPATIENT
Start: 2018-09-17 | End: 2018-09-17 | Stop reason: HOSPADM

## 2018-09-17 RX ORDER — LIDOCAINE 40 MG/G
CREAM TOPICAL
Status: DISCONTINUED | OUTPATIENT
Start: 2018-09-17 | End: 2018-09-17 | Stop reason: HOSPADM

## 2018-09-17 RX ORDER — ACETAMINOPHEN 500 MG
1000 TABLET ORAL EVERY 4 HOURS PRN
Status: DISCONTINUED | OUTPATIENT
Start: 2018-09-17 | End: 2018-09-17 | Stop reason: HOSPADM

## 2018-09-17 RX ORDER — ONDANSETRON 2 MG/ML
4 INJECTION INTRAMUSCULAR; INTRAVENOUS EVERY 30 MIN PRN
Status: DISCONTINUED | OUTPATIENT
Start: 2018-09-17 | End: 2018-09-17 | Stop reason: HOSPADM

## 2018-09-17 RX ORDER — LIDOCAINE HYDROCHLORIDE AND EPINEPHRINE 10; 10 MG/ML; UG/ML
INJECTION, SOLUTION INFILTRATION; PERINEURAL PRN
Status: DISCONTINUED | OUTPATIENT
Start: 2018-09-17 | End: 2018-09-17 | Stop reason: HOSPADM

## 2018-09-17 RX ORDER — CEFAZOLIN SODIUM 1 G/3ML
1 INJECTION, POWDER, FOR SOLUTION INTRAMUSCULAR; INTRAVENOUS SEE ADMIN INSTRUCTIONS
Status: DISCONTINUED | OUTPATIENT
Start: 2018-09-17 | End: 2018-09-17 | Stop reason: HOSPADM

## 2018-09-17 RX ORDER — OXYCODONE HYDROCHLORIDE 5 MG/1
10 TABLET ORAL
Status: COMPLETED | OUTPATIENT
Start: 2018-09-17 | End: 2018-09-17

## 2018-09-17 RX ORDER — NALOXONE HYDROCHLORIDE 0.4 MG/ML
.1-.4 INJECTION, SOLUTION INTRAMUSCULAR; INTRAVENOUS; SUBCUTANEOUS
Status: DISCONTINUED | OUTPATIENT
Start: 2018-09-17 | End: 2018-09-17 | Stop reason: HOSPADM

## 2018-09-17 RX ORDER — NEOSTIGMINE METHYLSULFATE 1 MG/ML
VIAL (ML) INJECTION PRN
Status: DISCONTINUED | OUTPATIENT
Start: 2018-09-17 | End: 2018-09-17

## 2018-09-17 RX ORDER — MINERAL OIL
OIL (ML) MISCELLANEOUS PRN
Status: DISCONTINUED | OUTPATIENT
Start: 2018-09-17 | End: 2018-09-17 | Stop reason: HOSPADM

## 2018-09-17 RX ORDER — IBUPROFEN 600 MG/1
600 TABLET, FILM COATED ORAL EVERY 4 HOURS PRN
Qty: 30 TABLET | Refills: 0 | COMMUNITY
Start: 2018-09-17 | End: 2024-09-09

## 2018-09-17 RX ADMIN — HYDROMORPHONE HYDROCHLORIDE 0.5 MG: 1 INJECTION, SOLUTION INTRAMUSCULAR; INTRAVENOUS; SUBCUTANEOUS at 13:43

## 2018-09-17 RX ADMIN — CEFAZOLIN 1 G: 1 INJECTION, POWDER, FOR SOLUTION INTRAMUSCULAR; INTRAVENOUS at 13:12

## 2018-09-17 RX ADMIN — ACETAMINOPHEN 1000 MG: 500 TABLET ORAL at 14:47

## 2018-09-17 RX ADMIN — SCOPALAMINE 1 PATCH: 1 PATCH, EXTENDED RELEASE TRANSDERMAL at 09:39

## 2018-09-17 RX ADMIN — PHENYLEPHRINE HYDROCHLORIDE 100 MCG: 10 INJECTION, SOLUTION INTRAMUSCULAR; INTRAVENOUS; SUBCUTANEOUS at 11:50

## 2018-09-17 RX ADMIN — ROCURONIUM BROMIDE 10 MG: 10 INJECTION INTRAVENOUS at 12:08

## 2018-09-17 RX ADMIN — FENTANYL CITRATE 50 MCG: 50 INJECTION, SOLUTION INTRAMUSCULAR; INTRAVENOUS at 12:35

## 2018-09-17 RX ADMIN — PROPOFOL 200 MCG/KG/MIN: 10 INJECTION, EMULSION INTRAVENOUS at 12:00

## 2018-09-17 RX ADMIN — PROPOFOL 260 MG: 10 INJECTION, EMULSION INTRAVENOUS at 11:21

## 2018-09-17 RX ADMIN — FENTANYL CITRATE 50 MCG: 50 INJECTION INTRAMUSCULAR; INTRAVENOUS at 14:01

## 2018-09-17 RX ADMIN — PHENYLEPHRINE HYDROCHLORIDE 0.2 MCG/KG/MIN: 10 INJECTION, SOLUTION INTRAMUSCULAR; INTRAVENOUS; SUBCUTANEOUS at 12:02

## 2018-09-17 RX ADMIN — Medication 3 G: at 11:15

## 2018-09-17 RX ADMIN — ONDANSETRON 4 MG: 2 INJECTION INTRAMUSCULAR; INTRAVENOUS at 13:11

## 2018-09-17 RX ADMIN — Medication 5 MG: at 11:37

## 2018-09-17 RX ADMIN — PROPOFOL 200 MCG/KG/MIN: 10 INJECTION, EMULSION INTRAVENOUS at 11:21

## 2018-09-17 RX ADMIN — DEXMEDETOMIDINE HYDROCHLORIDE 20 MCG: 100 INJECTION, SOLUTION INTRAVENOUS at 11:21

## 2018-09-17 RX ADMIN — SODIUM CHLORIDE, POTASSIUM CHLORIDE, SODIUM LACTATE AND CALCIUM CHLORIDE: 600; 310; 30; 20 INJECTION, SOLUTION INTRAVENOUS at 13:36

## 2018-09-17 RX ADMIN — GLYCOPYRROLATE 1 MG: 0.2 INJECTION, SOLUTION INTRAMUSCULAR; INTRAVENOUS at 13:36

## 2018-09-17 RX ADMIN — ROCURONIUM BROMIDE 50 MG: 10 INJECTION INTRAVENOUS at 11:21

## 2018-09-17 RX ADMIN — DEXAMETHASONE SODIUM PHOSPHATE 8 MG: 4 INJECTION, SOLUTION INTRAMUSCULAR; INTRAVENOUS at 11:55

## 2018-09-17 RX ADMIN — FENTANYL CITRATE 50 MCG: 50 INJECTION, SOLUTION INTRAMUSCULAR; INTRAVENOUS at 11:53

## 2018-09-17 RX ADMIN — FENTANYL CITRATE 50 MCG: 50 INJECTION INTRAMUSCULAR; INTRAVENOUS at 14:12

## 2018-09-17 RX ADMIN — MIDAZOLAM 2 MG: 1 INJECTION INTRAMUSCULAR; INTRAVENOUS at 11:09

## 2018-09-17 RX ADMIN — NEOSTIGMINE METHYLSULFATE 5 MG: 1 INJECTION, SOLUTION INTRAVENOUS at 13:36

## 2018-09-17 RX ADMIN — Medication 7.5 MG: at 11:49

## 2018-09-17 RX ADMIN — OXYCODONE HYDROCHLORIDE 10 MG: 5 TABLET ORAL at 14:25

## 2018-09-17 RX ADMIN — SODIUM CHLORIDE, POTASSIUM CHLORIDE, SODIUM LACTATE AND CALCIUM CHLORIDE: 600; 310; 30; 20 INJECTION, SOLUTION INTRAVENOUS at 11:11

## 2018-09-17 ASSESSMENT — ENCOUNTER SYMPTOMS
DYSRHYTHMIAS: 0
APNEA: 0

## 2018-09-17 NOTE — DISCHARGE INSTRUCTIONS
Same-Day Surgery   Adult Discharge Orders & Instructions     For 24 hours after surgery:  1. Get plenty of rest.  A responsible adult must stay with you for at least 24 hours after you leave the hospital.   2. Pain medication can slow your reflexes. Do not drive or use heavy equipment.  If you have weakness or tingling, don't drive or use heavy equipment until this feeling goes away.  3. Mixing alcohol and pain medication can cause dizziness and slow your breathing. It can even be fatal. Do not drink alcohol while taking pain medication.  4. Avoid strenuous or risky activities.  Ask for help when climbing stairs.   5. You may feel lightheaded.  If so, sit for a few minutes before standing.  Have someone help you get up.   6. If you have nausea (feel sick to your stomach), drink only clear liquids such as apple juice, ginger ale, broth or 7-Up.  Rest may also help.  Be sure to drink enough fluids.  Move to a regular diet as you feel able. Take pain medications with a small amount of solid food, such as toast or crackers, to avoid nausea.   7. A slight fever is normal. Call the doctor if your fever is over 100 F (37.7 C) (taken under the tongue) or lasts longer than 24 hours.  8. You may have a dry mouth, muscle aches, trouble sleeping or a sore throat.  These symptoms should go away after 24 hours.  9. Do not make important or legal decisions.   Pain Management:      1. Take pain medication (if prescribed) for pain as directed by your physician.        2. WARNING: If the pain medication you have been prescribed contains Tylenol (acetaminophen), DO NOT take additional doses of Tylenol (acetaminophen). GAVE 1,000mg of Tylenol (acetaminophen) at 2:47 pm on 18.      Call your doctor for any of the followin.  Signs of infection (fever, growing tenderness at the surgery site, severe pain, a large amount of drainage or bleeding, foul-smelling drainage, redness, swelling).    2.  It has been over 8 to 10 hours  since surgery and you are still not able to urinate (pee).    3.  Headache for over 24 hours.      To contact a doctor, call [phone] or:      [phone] and ask for the Resident On Call for:  Otolaryngology (answered 24 hours a day)      Emergency Department:  [facility]: [phone]  [facility]: [phone]               Rev. 10/2014            Tips for taking pain medications  To get the best pain relief possible , remember these points:      Take pain medications as directed, before pain becomes severe      Pain medication can upset your stomach: taking it with food may help      Constipation is a common side effect of pain medication. Drink plenty of  Fluids      Eat foods high in fiber. Take a stool softener  if recommended by your doctor or  Pharmacist.        Do not drink alcohol, drive or operate machinery while taking pain medications.      Ask about other ways to control pain, such as with heat, ice or relaxation.        Scopolamine Patch- (Absorbed through the skin)    Prevents nausea and vomiting caused by motion sickness or anesthesia and surgery in adults.    Brand Name(s): Transderm Scop, Transderm-Scope  There may be other brand names for this medicine.    When This Medicine Should Not Be Used:  You should not use this medicine if you have had an allergic reaction to scopolomine, or if you have narrow angle glaucoma.    How to Use This Medicine:  Patch      Your doctor will tell you how many patches to use, where to apply them, and how often to apply them. Do not use more patches or apply them more often than your doctor tells you to.    Leave the patch in its sealed wrapper until you are ready to put it on. Tear the wrapper open carefully. NEVER CUT the wrapper or the patch with scissors.     After you take off the patch, wash the place where the patch was and your hands thoroughly.    Only one patch should be used at any time.      How to Dispose of  This Medicine:      Fold the used patch in half with the sticky sides together. Throw any used patch away so that children or pets cannot get to it. You will also need to throw away old patches after the expiration date has passed.    Keep all medicine away from children and never share your medicine with anyone.    Drugs and Foods to Avoid:  Ask your doctor or pharmacist before using any other medicine, including over-the-counter medicines, vitamins, and herbal products.      Tell your doctor if you are using any medicines that make you sleepy. These include sleeping pills, cold and allergy medicine, narcotic pain relievers, and sedatives.     Do not drink alcohol while you are using this medicine.    Warnings While Using This Medicine:      Make sure your doctor knows if you are pregnant or breastfeeding, or if you have glaucoma, prostate problems, trouble urinating, blocked bowels, liver disease, kidney disease, or a history of seizures or mental illness.    This medicine can cause blurring of vision and other vision problems if it comes in contact with the eyes. This medicine may also cause problems with urination. If any of these reactions occur, remove the patch and call your doctor right away.    This medicine may make you dizzy or drowsy. Avoid driving, using machines, or doing anything else that could be dangerous if you are not alert. If you plan to participate in underwater sports, this medicine may cause disorienting effects. If this is a concern for you, talk with your doctor.    This medicine may make you sweat less and cause your body to get too hot. Be careful in hot weather, when your are exercising, or if using sauna or whirlpool.    Make sure any doctor or dentist who treats you knows that you are using this medicine. This medicine may affect the results of certain medical tests.    Skin burns have been reported at the patch site in several patients wearing an aluminized transdermal system during  a magnetic resonance imaging scan (MRI). Because Transderm Scop contains aluminum, it is recommended to remove the system before undergoing an MRI.    Possible Side Effects While Using This Medicine:  Call your doctor right away if you notice any of these side effects:      Allergic reaction: Itching or hives, swelling in your face or hands, swelling or tingling in your mouth or throat, chest tightness, trouble breathing.    Blurred vision,    Confusion or memory loss.    Fast,slow, or uneven heartbeat.    Lightheadedness, dizziness, drowsiness, or fainting.    Seeing, hearing, or feeling things that are not there.    Severe eye pain.    Trouble urinating.      If you notice these less serious side effects, talk with your doctor:     HOME CARE INSTRUCTIONS FOR PATIENTS WITH TYMPANOSTOMY TUBES    Tympanostomy tubes are used essentially for two purposes: to improve hearing ability by relieving pressure and fluid buildup behind the tympanic membrane (eardrum) and to reduce the number of middle ear infections which could lead to more serious diseases.     Usually, the tympanostomy tubes are rejected by the tympanic membrane in 4 to 12 months. The opening in the tympanic membrane usually heals within a few days. Often, the tube becomes trapped in earwax in the canal and no one is aware that the tube is no longer functioning. When this happens, fluid may develop in the middle ear. It is very important to understand that changes can occur in the middle ear without signs or symptoms. This is called  silent otitis media  and can lead to serious ear disease.      For these reasons, regular follow-up visits with the doctor every 3-4 months are essential in order to recognize potential problems.     Care to the ears:   Do not allow water or moisture in the ear. Malleable earplugs are available in our clinic and at most drug stores, which can be used to keep water out while washing hair and bathing.   Do not swim unless your  physician gave approval, and then only if proper earplugs are used to keep water out.   A small amount of pinkish drainage for the first day or two following tube insertions is not uncommon. If drainage continues past two (2) days, drainage develops later, or if the ear develops an odor, please call your physician. This usually means the ear is infected. Antibiotics and ear drops will be needed to treat the infection.   Observe the patient for signs of hearing loss. The patient may speak louder than usual, appear to ignore others when spoken to, turn the volume on the radio or television up, of may withdraw from others. These are all signs of hearing loss which could go easily undetected.   If the patient develops a cold, observe closely for drainage from the ear and/or fever. Notify your physician if these symptoms occur.   Return for appointments with the physician every 3 to 4 months on a regular basis.    IF YOU HAVE QUESTIONS OR CONCERNS, PLEASE CALL OUR OFFICE AT [phone] (24 hours/day, 7 days/week)           INFORMATION FOR PATIENTS WHO HAVE HAD EAR SURGERY  DO NOT ALLOW WATER OR MOISTURE IN OR AROUND THE EAR CANAL    OR INCISION  DO NOT WASH YOUR HAIR UNTIL AFTER YOUR FIRST VISIT FOLLOWING SURGERY unless your doctor gives you approval. Precautions must be taken to avoid any kind of water or moisture in your ear incision.   DO NOT BLOW YOUR NOSE. Avoid sneezing, if unavoidable, sneeze with your  mouth open.  SLEEP WITH YOUR HEAD PROPPED UP on two pillows for the first few nights after surgery or until the sutures are removed. This will help reduce swelling and promote drainage. Good sleep also promotes rapid healing.  Firm pillows give the best comfort for sleeping. Some find non-rocking, overstuffed chairs provide comfort. As long as your head is above your feet, a comfortable chair can be used for sleeping.  ACTIVITY:   DO NOT LIFT ANYTHING heavier than 5-10 pounds for 1 week.   Then you may increase  on a weekly basis. Example: 2nd week - 10-20 pounds; 3rd week - 20-30 pounds; 4th week - usual normal activity. STOP physical exercises such as aerobics, push-ups, sit-ups, etc, depending on the nature of the surgery performed.    Usually normal activity can be resumed after 1 month.   DO NOT STRAIN. If constipation is a problem, use a laxative.   AVOID QUICK HEAD AND BODY MOVEMENTS, THEY CAN CAUSE DIZZINESS. Some imbalance after surgery is normal, however spinning dizziness (vertigo) should be reported.   IF THE EAR IS PACKED, DO NOT REMOVE THE PACKING   CHANGE THE DRESSING OVER THE INCISION at least twice a day. Make sure you have CLEAN, DRY HANDS when changing the dressing (you will be given supplies including dressings and tape).   Use a cotton-tipped applicator to clean the incision, first with hydrogen peroxide followed by 70% alcohol solution.   Apply a thin layer of Bacitracin ointment twice daily.   Cover with the appropriate dressing. A nurse or physician will advise you on the proper dressing.   LOOK FOR SIGNS OF INFECTION if you have an incision, inspect it daily. Report increased pain, redness, swelling, or drainage to your doctor.   SOME DRAINAGE FROM YOUR EAR IS NORMAL AFTER SURGERY.  You will probably hear unusual sounds until total hearing is complete, generally 4-6 weeks after surgery.    AIRPLANE TRAVEL IS USUALLY RESTRICTED FOR 1 MONTH. Discuss  flying plans with your doctor to discuss measures to protect your ears.    CALL YOUR DOCTOR IF:   Temperature rises to 101 degrees or greater   Ear drainage increases rather than decreases   If ear drainage develops an odor  FOLLOW UP APPOINTMENT: Unless otherwise instructed by your doctor, return to the clinic in 1 week for packing and/or suture removal. You should be given an appointment when you leave the hospital after surgery, If not, please call for an appointment at [phone]. Depending on the nature of the ear procedure, post-operative  appointments may be scheduled every 2-3 weeks after surgery to insure proper healing. Our staff doctors are assisted by two associate doctors to promote quality care.    GENERAL LONG TERM INFORMATION  Do not fly if you have an upper respiratory infection.  Patients who have had mastoidectomy or canalplasty will need appointments every 6-12 months for ear cleanings for the remainder of their lives as their ears are no longer self cleaning as normal ears usually are.   All patients who have had an ear surgery should come for appointments at least once yearly.       IF YOU HAVE QUESTIONS OR CONCERNS, PLEASE CALL OUR OFFICE  AT [phone] (24 hours/day, 7 days/week)             Rev. 5/2014                                        Restlessness    Skin rash or redness.    If you notice other side effects that you think are caused by this medicine, tell your doctor immediately.    Please remove patch from behind your left ear within 24 hours of placement. Okay to remove tomorrow 9/18/18 at 9:30am.

## 2018-09-17 NOTE — OP NOTE
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      POSTOPERATIVE DIAGNOSES:   1.  Right external auditory canal stenosis.   2.  Right meatal stenosis.   3.  Granulation tissue of the middle ear.      PROCEDURES:     1.  Right ear removal of granulation tissue.     2, Canaloplasty.     3.  Meatoplasty.     4.  Right Thiersch graft.     5.  Removal of the existing pressure equalization tube.     6.  Myringotomy with placement of Paparella #2 pressure equalization tube.   7.  Myringoplasty.      ATTENDING SURGEON:  Stephan Crowley MD.        ASSISTANT:  Letty Perez DO.        ANESTHESIA:  General endotracheal intubation.      ESTIMATED BLOOD LOSS:  15 mL.      COMPLICATIONS:  None.      DRAINS:  None.      SPECIMENS:  None.      FINDINGS:   1.  Thick meatal skin.   2.  Lembert meatoplasty performed.   3.  Narrow obstructive bony external auditory canal.   4.  Number 2 Paparella tympanostomy tube removed and replaced.   5.  No granulation tissue of external canal noted, granulation tissue present within the middle ear, removed.     6.  Posterior superior myringoplasty, temporalis fascia.      COMPLICATIONS:  None.      IMPLANTS:  None.      INDICATIONS FOR PROCEDURE:  The patient is a 38-year-old female who presented to the office with a history of conductive hearing loss as well as multiple ear surgeries.  She had some history of drainage and blood coming from the ear.  She was noted to have granulation tissue within the external auditory canal.  She had narrow external auditory canal as well preventing the visualization and cleaning of the ear itself.  She was, therefore, consented for the above-mentioned procedures.  The risks versus benefits and alternatives of the procedure were discussed with the patient.  The risks of the procedure including injury to the facial nerve, injury to the ossicular  chain, tympanic membrane perforation, need for further procedures, and canal restenosis were discussed with the patient in detail.  She understood the risks of the procedure and elected to proceed with surgical management.      PROCEDURE:  After appropriate informed consent was signed and placed on the chart, the patient was taken to the operative suite and placed in supine position, and endotracheally intubated.  Bed was rotated 90 degrees and the patient was prepped and draped in a sterile fashion with Betadine solution.  A timeout was performed to identify the correct patient, procedure and laterality.  The skin of the external auditory canal and the preauricular skin were injected with 5 mL of 1% lidocaine with epinephrine.  A #15 blade scalpel was used to perform a Lempert 1 and 2 incisions for endaural approach.  The Lempert elevator was then used to elevate the posterior external auditory canal skin out of the bony canal and it was retracted with a self-retaining retractor.  The bony canal was then visualized and found to be narrow and obstructive.  The anterior canal skin was incised near the annulus and removed.  The #2, #3 and #4 mm cutting bur were then used to enlarge the canal circumferentially.  There was no injury to the temporomandibular joint, neither was there injury to the skull base or the facial nerve.  The wound was copiously irrigated.  Of note, the patient had had a previous atticotomy and the piece of temporalis fascia was, therefore, harvested and placed as a myringoplasty over this area of the canal.  The diseased tissue of the meatal region was then removed with Bovie electrocautery, iris scissor, and forceps.  This resulted in gross enlargement of the meatus itself.  The #2 Paparella tube was visualized and this was removed with an alligator forceps.  A fresh tympanostomy tube was then modified to create a butterfly type of flange and this was placed within the previously noted  myringotomy site.      The Thiersch grafts were then harvested from the right forearm.  Three milliliters 1% lidocaine with epinephrine were injected subcutaneously in the right forearm.  A razor blade was then used to harvest very thin split thickness skin grafts from the right arm.  They were placed on Larsen silk coated in bacitracin ointment.  The skin grafts were then spread across the Sly silk and sized to the graft itself.  The canal was visualized and the areas of denuded bone were then measured and the skin grafts were sized appropriately.  The Thiersch grafts were then placed along the exposed anterior canal, posterior canal, and meatus .  The Sly's silk was then placed alongside of the graft site and a cotton packing was then placed within the Larsen silk for a petros packing.  The endaural incision was then closed in multiple layers with a 3-0 Vicryl suture to reapproximate the cartilage and tissue and a 4-0 nylon suture to reapproximate the skin.  The exposed skin of the meatus was then grafted with Thiersch grafts as well.  Three grafts were placed along the meatus.  A separate and second petros packing was then placed on top of the first in a similar fashion.  The remainder of the external auditory canal was lightly packed with Vaseline gauze.  A small dressing was applied sterilely.  The patient was then returned to anesthesia for an uncomplicated emergence.  Dr. Crowley was present and performed the key portions of the procedure.         MATT KESSLER DO             D: 2018   T: 2018   MT: SHAYNA      Name:     INGRIS GUTIERREZ   MRN:      [id number]        Account:        [id number]   :      1980           Procedure Date: 2018      Document: [id number]

## 2018-09-17 NOTE — ANESTHESIA PREPROCEDURE EVALUATION
Anesthesia Evaluation    ROS/Med Hx    History of anesthetic complications (H/O PONV)  (-) malignant hyperthermia and tuberculosis  Comments: Met with Elisha who is NPO and  is scheduled for: Procedure(s):  GRAFT THIERSCH STATUS POST TYMPANOMASTOIDECTOMY  CANALPLASTY    Past Medical History:  H/O: Anxiety  H/O: Depression  9/2015: Elevated liver enzymes      Comment: ultrasound showed enlarged liver.   H/O: Hearing loss  - and other ear problems as noted by ENT service  H/O: PONV (postoperative nausea and vomiting)  H/O Depression    Past Surgical History:  10/23/2017: GRAFT THIERSCH STATUS POST TYMPANOMASTOIDECTOMY Right      Comment: Procedure: GRAFT THIERSCH STATUS POST                TYMPANOMASTOIDECTOMY;  Right Removal of                Granulation Tissue, Removal of Packing and                Sutures, Thiersch Graft;  Surgeon: Stephan Crowley MD;  Location: [facility] OR  10/12/2017: MASTOIDECTOMY Right      Comment: Procedure: MASTOIDECTOMY;;  Surgeon:                Stephan Crowley MD;  Location: [facility] OR  10/12/2017: MEATOPLASTY EAR Right      Comment: Procedure: MEATOPLASTY EAR;;  Surgeon:                Stephan Crowley MD;  Location: [facility] OR  5/14/2015: MYRINGOTOMY, INSERT TUBE BILATERAL, COMBINED Bilateral      Comment: Procedure: COMBINED MYRINGOTOMY, INSERT TUBE                BILATERAL;  Surgeon: Stephan Crowley MD;                 Location: [facility] OR  No date: PE TUBES  No date: TONSILLECTOMY  5/14/2015: TYMPANOPLASTY Left      Comment: Procedure: TYMPANOPLASTY;  Surgeon: Stephan Crowley MD;  Location: [facility] OR  10/12/2017: TYMPANOPLASTY Right      Comment: Procedure: TYMPANOPLASTY;  Right Endural                Flexiable Tympanoplasty, Middle Ear                Reconstruction,Mastoidectomy, Myringotomy and                Tube (#2 Paparella tube), Meatoplasty ;                 Surgeon: Stephan Crowley MD;  Location: [facility]                OR    She denies problems with GA  "other than H/O PONV; she requests scopalamine patch.     Cardiovascular Findings   (-) hypertension, dysrhythmias and pacemaker  Comments: September 10, 2018: EKG NSR.     Neuro Findings   Comments: H/O anxiety and depression    Pulmonary Findings   (-) asthma and apnea    HENT Findings - negative HENT ROS    Skin Findings   (+) rash (has a body rash and is on meds for this; )      GI/Hepatic/Renal Findings   (+) PONV  Comments: H/O elevated liver enzymes    Endocrine/Metabolic Findings - negative ROS      Genetic/Syndrome Findings - negative genetics/syndromes ROS    Hematology/Oncology Findings - negative hematology/oncology ROS    Additional Notes  Allergies:   -- Seasonal Allergies   Current Outpatient Prescriptions:      benzonatate (TESSALON) 200 MG capsule, Take 1 capsule (200 mg) by mouth 3 times daily as needed for cough, Disp: 21 capsule, Rfl: 0     cetirizine (ZYRTEC) 10 MG tablet, Take 10 mg by mouth daily, Disp: , Rfl:      DiphenhydrAMINE HCl (BENADRYL PO), Take 50 mg by mouth nightly as needed, Disp: , Rfl:      etonogestrel-ethinyl estradiol (NUVARING) 0.12-0.015 MG/24HR vaginal ring, Place 1 each vaginally every 28 days Place for 3 weeks and then remove for one week, Disp: , Rfl:      guaiFENesin-codeine (ROBITUSSIN AC) 100-10 MG/5ML SOLN solution, Take 5-10 mLs by mouth every 4 hours as needed for cough, Disp: 120 mL, Rfl: 0     HYDROXYZINE HCL PO, Take 25 mg by mouth daily as needed for itching, Disp: , Rfl:            Physical Exam      Airway   Mallampati: I  TM distance: >3 FB  Neck ROM: full    Dental   Comment: stable    Cardiovascular   Rhythm and rate: regular and normal      Pulmonary    breath sounds clear to auscultation    Other findings: /77  Temp 37  C (98.6  F) (Oral)  Resp 18  Ht 1.6 m (5' 3\")  Wt 115.8 kg (255 lb 4.7 oz)  SpO2 97%  BMI 45.22 kg/m2      Anesthesia Plan      History & Physical Review  History and physical reviewed and following examination, relevant " changes include: also conducted a medical interview with Elisha    ASA Status:  2 .    NPO Status:  > 6 hours    Plan for General and ETT with Intravenous and Propofol induction. Maintenance will be TIVA.    PONV prophylaxis:  Ondansetron (or other 5HT-3), Scopolamine patch and Dexamethasone or Solumedrol  Elisha requests GA and requests a scopalamine patch. Procedures and risks explained. She understood and consented. Qs answered. (Plan is to use TIVA).       Postoperative Care  Postoperative pain management:  Oral pain medications.      Consents  Anesthetic plan, risks, benefits and alternatives discussed with:  Patient.  Use of blood products discussed: No .   .

## 2018-09-17 NOTE — ANESTHESIA CARE TRANSFER NOTE
Patient: Elisha Nunez    Procedure(s):  Right Ear Removal Of Granulation Tissue,  Canalplasty,   Meatoplasty,   Right Thiersch Graft,  Removal of Right Ear Existing Pressure Equalization Tube,  Myringotomy with placement of Paparella #2 Pressure Equalization Tube,  Myringoplasty   - Wound Class: I-Clean   - Wound Class: II-Clean Contaminated    Diagnosis: Mixed Conductive/Sensorinerual Hearing  Loss Right Ear,   Diagnosis Additional Information: No value filed.    Anesthesia Type:   General, ETT     Note:  Airway :Face Mask  Patient transferred to:PACU  Comments: To [facility].  Report to RN.  VSS  121/85, HR 71, sat 99%, RR 16Handoff Report: Identifed the Patient, Identified the Reponsible Provider, Reviewed the pertinent medical history, Discussed the surgical course, Reviewed Intra-OP anesthesia mangement and issues during anesthesia, Set expectations for post-procedure period and Allowed opportunity for questions and acknowledgement of understanding      Vitals: (Last set prior to Anesthesia Care Transfer)    CRNA VITALS  9/17/2018 1313 - 9/17/2018 1353      9/17/2018             Resp Rate (observed): (!)  1                Electronically Signed By: MARKUS Dawson CRNA  September 17, 2018  1:53 PM

## 2018-09-17 NOTE — IP AVS SNAPSHOT
MRN:[id number]                      After Visit Summary   9/17/2018    Elisha Nunez    MRN: [id number]           Thank you!     Thank you for choosing [facility] for your care. Our goal is always to provide you with excellent care. Hearing back from our patients is one way we can continue to improve our services. Please take a few minutes to complete the written survey that you may receive in the mail after you visit with us. Thank you!        Patient Information     Date Of Birth          1980        About your hospital stay     You were admitted on:  September 17, 2018 You last received care in the:  [facility] PACU    You were discharged on:  September 17, 2018       Who to Call     For medical emergencies, please call 911.  For non-urgent questions about your medical care, please call your primary care provider or clinic, [phone]  For questions related to your surgery, please call your surgery clinic        Attending Provider     Provider Specialty    Stephan Crowley MD Otolaryngology       Primary Care Provider Office Phone # Fax #    Julia Beltran [phone]      After Care Instructions     Diet Instructions       Resume pre procedure diet            Discharge Instructions       Patient to follow up with surgeon in 7-10 days            Discharge Instructions - Lifting restrictions       Lifting Restrictions 10 pounds until seen at Post-op follow up appointment            No Alcohol       For 24 hours following procedure            No blowing nose           No driving or operating machinery       until the day after procedure            Wound care       Keep dressing clean and dry and intact                  Further instructions from your care team       Same-Day Surgery   Adult Discharge Orders & Instructions     For 24 hours after surgery:  1. Get plenty of rest.  A responsible adult must stay with you for at least 24 hours after you leave the hospital.   2. Pain  medication can slow your reflexes. Do not drive or use heavy equipment.  If you have weakness or tingling, don't drive or use heavy equipment until this feeling goes away.  3. Mixing alcohol and pain medication can cause dizziness and slow your breathing. It can even be fatal. Do not drink alcohol while taking pain medication.  4. Avoid strenuous or risky activities.  Ask for help when climbing stairs.   5. You may feel lightheaded.  If so, sit for a few minutes before standing.  Have someone help you get up.   6. If you have nausea (feel sick to your stomach), drink only clear liquids such as apple juice, ginger ale, broth or 7-Up.  Rest may also help.  Be sure to drink enough fluids.  Move to a regular diet as you feel able. Take pain medications with a small amount of solid food, such as toast or crackers, to avoid nausea.   7. A slight fever is normal. Call the doctor if your fever is over 100 F (37.7 C) (taken under the tongue) or lasts longer than 24 hours.  8. You may have a dry mouth, muscle aches, trouble sleeping or a sore throat.  These symptoms should go away after 24 hours.  9. Do not make important or legal decisions.   Pain Management:      1. Take pain medication (if prescribed) for pain as directed by your physician.        2. WARNING: If the pain medication you have been prescribed contains Tylenol (acetaminophen), DO NOT take additional doses of Tylenol (acetaminophen). GAVE 1,000mg of Tylenol (acetaminophen) at 2:47 pm on 18.      Call your doctor for any of the followin.  Signs of infection (fever, growing tenderness at the surgery site, severe pain, a large amount of drainage or bleeding, foul-smelling drainage, redness, swelling).    2.  It has been over 8 to 10 hours since surgery and you are still not able to urinate (pee).    3.  Headache for over 24 hours.      To contact a doctor, call [phone] or:      [phone] and ask for the Resident On Call for:  Otolaryngology  (answered 24 hours a day)      Emergency Department:  [facility]: [phone]  [facility]: [phone]               Rev. 10/2014            Tips for taking pain medications  To get the best pain relief possible , remember these points:      Take pain medications as directed, before pain becomes severe      Pain medication can upset your stomach: taking it with food may help      Constipation is a common side effect of pain medication. Drink plenty of  Fluids      Eat foods high in fiber. Take a stool softener  if recommended by your doctor or  Pharmacist.        Do not drink alcohol, drive or operate machinery while taking pain medications.      Ask about other ways to control pain, such as with heat, ice or relaxation.        Scopolamine Patch- (Absorbed through the skin)    Prevents nausea and vomiting caused by motion sickness or anesthesia and surgery in adults.    Brand Name(s): Transderm Scop, Transderm-Scope  There may be other brand names for this medicine.    When This Medicine Should Not Be Used:  You should not use this medicine if you have had an allergic reaction to scopolomine, or if you have narrow angle glaucoma.    How to Use This Medicine:  Patch      Your doctor will tell you how many patches to use, where to apply them, and how often to apply them. Do not use more patches or apply them more often than your doctor tells you to.    Leave the patch in its sealed wrapper until you are ready to put it on. Tear the wrapper open carefully. NEVER CUT the wrapper or the patch with scissors.     After you take off the patch, wash the place where the patch was and your hands thoroughly.    Only one patch should be used at any time.      How to Dispose of This Medicine:      Fold the used patch in half with the sticky sides together. Throw any used patch away so that children or pets cannot get to it. You will also need to throw away old patches after the  expiration date has passed.    Keep all medicine away from children and never share your medicine with anyone.    Drugs and Foods to Avoid:  Ask your doctor or pharmacist before using any other medicine, including over-the-counter medicines, vitamins, and herbal products.      Tell your doctor if you are using any medicines that make you sleepy. These include sleeping pills, cold and allergy medicine, narcotic pain relievers, and sedatives.     Do not drink alcohol while you are using this medicine.    Warnings While Using This Medicine:      Make sure your doctor knows if you are pregnant or breastfeeding, or if you have glaucoma, prostate problems, trouble urinating, blocked bowels, liver disease, kidney disease, or a history of seizures or mental illness.    This medicine can cause blurring of vision and other vision problems if it comes in contact with the eyes. This medicine may also cause problems with urination. If any of these reactions occur, remove the patch and call your doctor right away.    This medicine may make you dizzy or drowsy. Avoid driving, using machines, or doing anything else that could be dangerous if you are not alert. If you plan to participate in underwater sports, this medicine may cause disorienting effects. If this is a concern for you, talk with your doctor.    This medicine may make you sweat less and cause your body to get too hot. Be careful in hot weather, when your are exercising, or if using sauna or whirlpool.    Make sure any doctor or dentist who treats you knows that you are using this medicine. This medicine may affect the results of certain medical tests.    Skin burns have been reported at the patch site in several patients wearing an aluminized transdermal system during a magnetic resonance imaging scan (MRI). Because Transderm Scop contains aluminum, it is recommended to remove the system before undergoing an MRI.    Possible Side Effects While Using This  Medicine:  Call your doctor right away if you notice any of these side effects:      Allergic reaction: Itching or hives, swelling in your face or hands, swelling or tingling in your mouth or throat, chest tightness, trouble breathing.    Blurred vision,    Confusion or memory loss.    Fast,slow, or uneven heartbeat.    Lightheadedness, dizziness, drowsiness, or fainting.    Seeing, hearing, or feeling things that are not there.    Severe eye pain.    Trouble urinating.      If you notice these less serious side effects, talk with your doctor:     HOME CARE INSTRUCTIONS FOR PATIENTS WITH TYMPANOSTOMY TUBES    Tympanostomy tubes are used essentially for two purposes: to improve hearing ability by relieving pressure and fluid buildup behind the tympanic membrane (eardrum) and to reduce the number of middle ear infections which could lead to more serious diseases.     Usually, the tympanostomy tubes are rejected by the tympanic membrane in 4 to 12 months. The opening in the tympanic membrane usually heals within a few days. Often, the tube becomes trapped in earwax in the canal and no one is aware that the tube is no longer functioning. When this happens, fluid may develop in the middle ear. It is very important to understand that changes can occur in the middle ear without signs or symptoms. This is called  silent otitis media  and can lead to serious ear disease.      For these reasons, regular follow-up visits with the doctor every 3-4 months are essential in order to recognize potential problems.     Care to the ears:   Do not allow water or moisture in the ear. Malleable earplugs are available in our clinic and at most drug stores, which can be used to keep water out while washing hair and bathing.   Do not swim unless your physician gave approval, and then only if proper earplugs are used to keep water out.   A small amount of pinkish drainage for the first day or two following tube insertions is not uncommon. If  drainage continues past two (2) days, drainage develops later, or if the ear develops an odor, please call your physician. This usually means the ear is infected. Antibiotics and ear drops will be needed to treat the infection.   Observe the patient for signs of hearing loss. The patient may speak louder than usual, appear to ignore others when spoken to, turn the volume on the radio or television up, of may withdraw from others. These are all signs of hearing loss which could go easily undetected.   If the patient develops a cold, observe closely for drainage from the ear and/or fever. Notify your physician if these symptoms occur.   Return for appointments with the physician every 3 to 4 months on a regular basis.    IF YOU HAVE QUESTIONS OR CONCERNS, PLEASE CALL OUR OFFICE AT [phone] (24 hours/day, 7 days/week)           INFORMATION FOR PATIENTS WHO HAVE HAD EAR SURGERY  DO NOT ALLOW WATER OR MOISTURE IN OR AROUND THE EAR CANAL    OR INCISION  DO NOT WASH YOUR HAIR UNTIL AFTER YOUR FIRST VISIT FOLLOWING SURGERY unless your doctor gives you approval. Precautions must be taken to avoid any kind of water or moisture in your ear incision.   DO NOT BLOW YOUR NOSE. Avoid sneezing, if unavoidable, sneeze with your  mouth open.  SLEEP WITH YOUR HEAD PROPPED UP on two pillows for the first few nights after surgery or until the sutures are removed. This will help reduce swelling and promote drainage. Good sleep also promotes rapid healing.  Firm pillows give the best comfort for sleeping. Some find non-rocking, overstuffed chairs provide comfort. As long as your head is above your feet, a comfortable chair can be used for sleeping.  ACTIVITY:   DO NOT LIFT ANYTHING heavier than 5-10 pounds for 1 week.   Then you may increase on a weekly basis. Example: 2nd week - 10-20 pounds; 3rd week - 20-30 pounds; 4th week - usual normal activity. STOP physical exercises such as aerobics, push-ups, sit-ups, etc, depending on the  nature of the surgery performed.    Usually normal activity can be resumed after 1 month.   DO NOT STRAIN. If constipation is a problem, use a laxative.   AVOID QUICK HEAD AND BODY MOVEMENTS, THEY CAN CAUSE DIZZINESS. Some imbalance after surgery is normal, however spinning dizziness (vertigo) should be reported.   IF THE EAR IS PACKED, DO NOT REMOVE THE PACKING   CHANGE THE DRESSING OVER THE INCISION at least twice a day. Make sure you have CLEAN, DRY HANDS when changing the dressing (you will be given supplies including dressings and tape).   Use a cotton-tipped applicator to clean the incision, first with hydrogen peroxide followed by 70% alcohol solution.   Apply a thin layer of Bacitracin ointment twice daily.   Cover with the appropriate dressing. A nurse or physician will advise you on the proper dressing.   LOOK FOR SIGNS OF INFECTION if you have an incision, inspect it daily. Report increased pain, redness, swelling, or drainage to your doctor.   SOME DRAINAGE FROM YOUR EAR IS NORMAL AFTER SURGERY.  You will probably hear unusual sounds until total hearing is complete, generally 4-6 weeks after surgery.    AIRPLANE TRAVEL IS USUALLY RESTRICTED FOR 1 MONTH. Discuss  flying plans with your doctor to discuss measures to protect your ears.    CALL YOUR DOCTOR IF:   Temperature rises to 101 degrees or greater   Ear drainage increases rather than decreases   If ear drainage develops an odor  FOLLOW UP APPOINTMENT: Unless otherwise instructed by your doctor, return to the clinic in 1 week for packing and/or suture removal. You should be given an appointment when you leave the hospital after surgery, If not, please call for an appointment at [phone]. Depending on the nature of the ear procedure, post-operative appointments may be scheduled every 2-3 weeks after surgery to insure proper healing. Our staff doctors are assisted by two associate doctors to promote quality care.    GENERAL LONG TERM  "INFORMATION  Do not fly if you have an upper respiratory infection.  Patients who have had mastoidectomy or canalplasty will need appointments every 6-12 months for ear cleanings for the remainder of their lives as their ears are no longer self cleaning as normal ears usually are.   All patients who have had an ear surgery should come for appointments at least once yearly.       IF YOU HAVE QUESTIONS OR CONCERNS, PLEASE CALL OUR OFFICE  AT [phone] (24 hours/day, 7 days/week)             Rev. 2014                                        Restlessness    Skin rash or redness.    If you notice other side effects that you think are caused by this medicine, tell your doctor immediately.    Please remove patch from behind your left ear within 24 hours of placement. Okay to remove tomorrow 18 at 9:30am.     Pending Results     No orders found from 9/15/2018 to 2018.            Admission Information     Date & Time Provider Department Dept. Phone    2018 Stephan Crowley MD [facility] PACU [phone]      Your Vitals Were     Blood Pressure Temperature Respirations Height Weight Last Period    140/79 98.4  F (36.9  C) (Oral) 14 1.6 m (5' 3\") 115.8 kg (255 lb 4.7 oz) 2018 (Approximate)    Pulse Oximetry BMI (Body Mass Index)                97% 45.22 kg/m2          MyChart Information     [employer] lets you send messages to your doctor, view your test results, renew your prescriptions, schedule appointments and more. To sign up, go to [email or web address] . Click on \"Log in\" on the left side of the screen, which will take you to the Welcome page. Then click on \"Sign up Now\" on the right side of the page.     You will be asked to enter the access code listed below, as well as some personal information. Please follow the directions to create your username and password.     Your access code is: [email or web address]  Expires: 2018  3:25 PM     Your access code will  in 90 days. If you need help or " a new code, please call your [facility] clinic or [phone].        Care EveryWhere ID     This is your Care EveryWhere ID. This could be used by other organizations to access your [facility] medical records  [id number]        Equal Access to Services     CYN JAMES : Hadii aad ku hadamberkanika Soalessandroali, waaxda luqadaha, qaybta kaalmada adeanu, jailene deboin hayaakhang haddadishmael soria lakeisha oneill. So [facility] [phone].    ATENCIÓN: Si habla español, tiene a sharif disposición servicios gratuitos de asistencia lingüística. Llame al [phone].    We comply with applicable federal civil rights laws and [place] laws. We do not discriminate on the basis of race, color, national origin, age, disability, sex, sexual orientation, or gender identity.               Review of your medicines      START taking        Dose / Directions    acetaminophen 325 MG tablet   Commonly known as:  TYLENOL   Used for:  Chronic suppurative otitis media of right ear, unspecified otitis media location        Dose:  650 mg   Take 2 tablets (650 mg) by mouth every 4 hours as needed for other (mild pain)   Quantity:  100 tablet   Refills:  0       ibuprofen 600 MG tablet   Commonly known as:  ADVIL/MOTRIN   Used for:  Chronic suppurative otitis media of right ear, unspecified otitis media location        Dose:  600 mg   Take 1 tablet (600 mg) by mouth every 4 hours as needed for pain (mild)   Quantity:  30 tablet   Refills:  0       oxyCODONE IR 10 MG tablet   Commonly known as:  ROXICODONE   Used for:  Chronic suppurative otitis media of right ear, unspecified otitis media location        Dose:  5 mg   Take 0.5 tablets (5 mg) by mouth every 6 hours as needed for severe pain or other (Moderate to Severe)   Quantity:  12 tablet   Refills:  0         CONTINUE these medicines which have NOT CHANGED        Dose / Directions    BENADRYL PO        Dose:  50 mg   Take 50 mg by mouth nightly as needed   Refills:  0       benzonatate 200 MG capsule   Commonly known  as:  TESSALON        Dose:  200 mg   Take 1 capsule (200 mg) by mouth 3 times daily as needed for cough   Quantity:  21 capsule   Refills:  0       BUSPIRONE HCL PO        Dose:  15 mg   Take 15 mg by mouth 2 times daily   Refills:  0       cetirizine 10 MG tablet   Commonly known as:  zyrTEC        Dose:  10 mg   Take 10 mg by mouth daily   Refills:  0       CLONAZEPAM PO        Dose:  0.5 mg   Take 0.5 mg by mouth 2 times daily   Refills:  0       etonogestrel-ethinyl estradiol 0.12-0.015 MG/24HR vaginal ring   Commonly known as:  NUVARING        Dose:  1 each   Place 1 each vaginally every 28 days Place for 3 weeks and then remove for one week   Refills:  0       guaiFENesin-codeine 100-10 MG/5ML Soln solution   Commonly known as:  ROBITUSSIN AC        Dose:  1-2 tsp.   Take 5-10 mLs by mouth every 4 hours as needed for cough   Quantity:  120 mL   Refills:  0       HYDROXYZINE HCL PO        Dose:  25 mg   Take 25 mg by mouth daily as needed for itching   Refills:  0       IRON SUPPLEMENT PO        Dose:  325 mg   Take 325 mg by mouth 2 times daily (with meals)   Refills:  0            Where to get your medicines      Some of these will need a paper prescription and others can be bought over the counter. Ask your nurse if you have questions.     Bring a paper prescription for each of these medications     oxyCODONE IR 10 MG tablet       You don't need a prescription for these medications     acetaminophen 325 MG tablet    ibuprofen 600 MG tablet                Protect others around you: Learn how to safely use, store and throw away your medicines at www.disposemymeds.org.        Information about OPIOIDS     PRESCRIPTION OPIOIDS: WHAT YOU NEED TO KNOW   We gave you an opioid (narcotic) pain medicine. It is important to manage your pain, but opioids are not always the best choice. You should first try all the other options your care team gave you. Take this medicine for as short a time (and as few doses) as  possible.    Some activities can increase your pain, such as bandage changes or therapy sessions. It may help to take your pain medicine 30 to 60 minutes before these activities. Reduce your stress by getting enough sleep, working on hobbies you enjoy and practicing relaxation or meditation. Talk to your care team about ways to manage your pain beyond prescription opioids.    These medicines have risks:    DO NOT drive when on new or higher doses of pain medicine. These medicines can affect your alertness and reaction times, and you could be arrested for driving under the influence (DUI). If you need to use opioids long-term, talk to your care team about driving.    DO NOT operate heavy machinery    DO NOT do any other dangerous activities while taking these medicines.    DO NOT drink any alcohol while taking these medicines.     If the opioid prescribed includes acetaminophen, DO NOT take with any other medicines that contain acetaminophen. Read all labels carefully. Look for the word  acetaminophen  or  Tylenol.  Ask your pharmacist if you have questions or are unsure.    You can get addicted to pain medicines, especially if you have a history of addiction (chemical, alcohol or substance dependence). Talk to your care team about ways to reduce this risk.    All opioids tend to cause constipation. Drink plenty of water and eat foods that have a lot of fiber, such as fruits, vegetables, prune juice, apple juice and high-fiber cereal. Take a laxative (Miralax, milk of magnesia, Colace, Senna) if you don t move your bowels at least every other day. Other side effects include upset stomach, sleepiness, dizziness, throwing up, tolerance (needing more of the medicine to have the same effect), physical dependence and slowed breathing.    Store your pills in a secure place, locked if possible. We will not replace any lost or stolen medicine. If you don t finish your medicine, please throw away (dispose) as directed by your  pharmacist. The [place] Pollution Control Agency has more information about safe disposal: https://www.pca.[facility].[place].us/living-green/managing-unwanted-medications             Medication List: This is a list of all your medications and when to take them. Check marks below indicate your daily home schedule. Keep this list as a reference.      Medications           Morning Afternoon Evening Bedtime As Needed    acetaminophen 325 MG tablet   Commonly known as:  TYLENOL   Take 2 tablets (650 mg) by mouth every 4 hours as needed for other (mild pain)   Last time this was given:  1,000 mg on 9/17/2018  2:47 PM                                BENADRYL PO   Take 50 mg by mouth nightly as needed                                benzonatate 200 MG capsule   Commonly known as:  TESSALON   Take 1 capsule (200 mg) by mouth 3 times daily as needed for cough                                BUSPIRONE HCL PO   Take 15 mg by mouth 2 times daily                                cetirizine 10 MG tablet   Commonly known as:  zyrTEC   Take 10 mg by mouth daily                                CLONAZEPAM PO   Take 0.5 mg by mouth 2 times daily                                etonogestrel-ethinyl estradiol 0.12-0.015 MG/24HR vaginal ring   Commonly known as:  NUVARING   Place 1 each vaginally every 28 days Place for 3 weeks and then remove for one week                                guaiFENesin-codeine 100-10 MG/5ML Soln solution   Commonly known as:  ROBITUSSIN AC   Take 5-10 mLs by mouth every 4 hours as needed for cough                                HYDROXYZINE HCL PO   Take 25 mg by mouth daily as needed for itching                                ibuprofen 600 MG tablet   Commonly known as:  ADVIL/MOTRIN   Take 1 tablet (600 mg) by mouth every 4 hours as needed for pain (mild)                                IRON SUPPLEMENT PO   Take 325 mg by mouth 2 times daily (with meals)                                oxyCODONE IR 10 MG tablet   Commonly  known as:  ROXICODONE   Take 0.5 tablets (5 mg) by mouth every 6 hours as needed for severe pain or other (Moderate to Severe)   Last time this was given:  10 mg on 9/17/2018  2:25 PM                                          More Information             [facility]  INFORMATION FOR PATIENTS WHO HAVE HAD EAR SURGERY  Dr. Salgado    DO NOT ALLOW WATER OR MOISTURE IN OR AROUND THE EAR CANAL OR INCISION.    DO NOT WASH YOUR HAIR UNTIL AFTER YOUR FIRST VISIT, FOLLOWING SURGERY, unless your physician gives you approval. Precautions must be taken to avoid any kind of water or moisture in your ear or incision. Keep your hair away from your incision.    DO NOT BLOW YOUR NOSE.  AVOID SNEEZING, IF UNAVOIDABLE, SNEEZE WITH YOUR MOUTH OPEN.    SLEEP WITH YOUR HEAD PROPPED UP on 2 pillows for the first few nights after surgery or until sutures are removed.  This will help reduce swelling and promote drainage.  Good sleep also promotes rapid healing.  *Firm pillows give the best comfort for sleeping.  Some find a non-rocking, overstuffed chair provides comfort.  As long as your head is above your feet a comfortable chair can be used for sleeping.    DO NOT LIFT ANYTHING heavier than 5 to 10 pounds for one week.  Then you may increase on a weekly basis, for example, 2nd week, 10 to 20 pounds; 3rd week 20 to 30 pounds; 4th week usually normal activity.  Stop physical exercises such as aerobics, push ups, sit ups, etc., depending on the nature of the surgery performed.  Usually normal activity can be resumed after one month.    DO NOT STRAIN:  If constipation is a problem, use a laxative.    AVOID QUICK HEAD AND BODY MOVEMENTS, THEY CAN CAUSE DIZZINESS.  Some imbalance after ear surgery is normal, however, spinning dizziness (vertigo) should be reported.    IF THE EAR IS PACKED, DO NOT REMOVE PACKING.    AIRPLANE TRAVEL IS USUALLY RESTRICTED FOR ONE MONTH.  Discuss flying plans with your doctor and  measures to protect your ears.    CHANGE THE DRESSING OVER YOUR EAR OR INCISION at least twice each day.  Make sure you or your assistant have CLEAN, DRY HANDS when changing dressing.  Use a cotton tipped applicator to clean incision, first with hydrogen peroxide followed by 70% alcohol.  Then apply a thin layer of Bacitracin Ointment twice daily.  Cover with appropriate dressing, nurse or physician will advise.    LOOK FOR SIGNS OF INFECTION if you have an incision.  Inspect it each day.  Report increased pain, redness, swelling of drainage to your physician.    SOME DRAINAGE FROM YOUR EAR IS NORMAL AFTER SURGERY.  You will probably hear unusual sounds until total healing is complete, generally in 4 to 6 weeks.    CALL YOUR PHYSICIAN IF:  fever develops, ear drainage increases rather than decreases, or if drainage develops an odor.     Unless instructed otherwise by your physician, return to clinic in one week for packing and/or suture removal  Depending upon the nature of the ear procedure, post-op appointments may be scheduled every 2 to 3 weeks for the first 6 weeks after surgery to insure proper healing.    GENERAL LONG TERM INFORMATION:   *Do not fly if you have an upper respiratory infection   *Patients who have had mastoidectomy or canalplasty will need to make appointments every 6 to 12 months for ear cleaning for the remainder of their lives as their ears are not self-cleaning as normal ears usually are.   *All patients who have had ear surgery should come for appoints at least once yearly.    IF YOU HAVE ANY QUESTIONS OR PROBLEMS PLEASE CALL US AT [phone].  YOU WILL REACH A DOCTOR AT THIS NUMBER 24 HOURS A DAY OR CALL [facility] AT [phone].

## 2018-09-17 NOTE — ANESTHESIA POSTPROCEDURE EVALUATION
Patient: Elisha Nunez    Procedure(s):  Right Ear Removal Of Granulation Tissue,  Canalplasty,   Meatoplasty,   Right Thiersch Graft,  Removal of Right Ear Existing Pressure Equalization Tube,  Myringotomy with placement of Paparella #2 Pressure Equalization Tube,  Myringoplasty   - Wound Class: I-Clean   - Wound Class: II-Clean Contaminated    Diagnosis:Mixed Conductive/Sensorinerual Hearing  Loss Right Ear,   Diagnosis Additional Information: none    Anesthesia Type:  General, ETT    Note:  Anesthesia Post Evaluation    Patient location during evaluation: PACU  Patient participation: Able to fully participate in evaluation  Level of consciousness: awake  Pain management: adequate  Airway patency: patent  Cardiovascular status: stable  Respiratory status: spontaneous ventilation  Hydration status: euvolemic  PONV: none     Anesthetic complications: None    Comments: Awakening satisfactorily; strong; breathing well; oriented; has received oral dose of post op meds; comfortable; no complaints or complications;         Last vitals:  Vitals:    09/17/18 1415 09/17/18 1430 09/17/18 1445   BP: 108/73 118/80 103/68   Resp: 14 14 14   Temp: 37  C (98.6  F) 36.9  C (98.4  F)    SpO2: 99% 95% 96%         Electronically Signed By: Nicholas Reyna MD  September 17, 2018  3:06 PM

## 2018-09-17 NOTE — IP AVS SNAPSHOT
[place] [id number] [facility] [address]    Phone:  [phone]                                       After Visit Summary   9/17/2018    Elisha Nunez    MRN: [id number]           After Visit Summary Signature Page     I have received my discharge instructions, and my questions have been answered. I have discussed any challenges I see with this plan with the nurse or doctor.    ..........................................................................................................................................  Patient/Patient Representative Signature      ..........................................................................................................................................  Patient Representative Print Name and Relationship to Patient    ..................................................               ................................................  Date                                   Time    ..........................................................................................................................................  Reviewed by Signature/Title    ...................................................              ..............................................  Date                                               Time          22EPIC Rev 08/18

## 2018-09-17 NOTE — BRIEF OP NOTE
[facility], [place]    Brief Operative Note    Pre-operative diagnosis: Mixed Conductive/Sensorinerual Hearing  Loss Right Ear,   Post-operative diagnosis Right external auditory canal stenosis, right meatal stenosis, Granulation of middle ear  Procedure: Procedure(s):  Right Ear Removal Of Granulation Tissue,  Canalplasty,   Meatoplasty,   Right Thiersch Graft,  Removal of Right Ear Existing Pressure Equalization Tube,  Myringotomy with placement of Paparella #2 Pressure Equalization Tube,  Myringoplasty   - Wound Class: I-Clean   - Wound Class: II-Clean Contaminated  Surgeon: Surgeon(s) and Role:     * Stephan Crowley MD - Primary     * Letty Perez DO - Assisting  Anesthesia: General   Estimated blood loss: Minimal 15 cc  Drains: None  Specimens: * No specimens in log *  Findings:    1. Thick meatal skin  2. Lempert meatoplasty performed.  3. Narrow obstructive bony external audiotory canal.  4. #2 Paparella tympanostomy tube removed and replaced.  5. No granulation tissue of external canal noted, granulation tissue present in the middle ear- removed.  6. Posterior superior myringoplasty - temporalis fascia  Complications: None.  Implants: None.

## 2019-02-05 ENCOUNTER — TRANSFERRED RECORDS (OUTPATIENT)
Dept: HEALTH INFORMATION MANAGEMENT | Facility: CLINIC | Age: 39
End: 2019-02-05

## 2019-02-13 ENCOUNTER — HOSPITAL ENCOUNTER (OUTPATIENT)
Dept: CT IMAGING | Facility: HOSPITAL | Age: 39
Discharge: HOME | End: 2019-02-13
Attending: OTOLARYNGOLOGY | Admitting: OTOLARYNGOLOGY
Payer: COMMERCIAL

## 2019-02-13 DIAGNOSIS — J32.9 CHRONIC SINUSITIS, UNSPECIFIED LOCATION: ICD-10-CM

## 2019-02-13 PROCEDURE — 70486 CT MAXILLOFACIAL W/O DYE: CPT | Mod: TC

## 2019-05-09 ENCOUNTER — HOSPITAL ENCOUNTER (OUTPATIENT)
Facility: CLINIC | Age: 39
Discharge: HOME | End: 2019-05-09
Attending: OTOLARYNGOLOGY | Admitting: OTOLARYNGOLOGY
Payer: COMMERCIAL

## 2019-05-09 ENCOUNTER — ANESTHESIA EVENT (OUTPATIENT)
Dept: SURGERY | Facility: CLINIC | Age: 39
End: 2019-05-09
Payer: COMMERCIAL

## 2019-05-09 ENCOUNTER — ANESTHESIA (OUTPATIENT)
Dept: SURGERY | Facility: CLINIC | Age: 39
End: 2019-05-09
Payer: COMMERCIAL

## 2019-05-09 VITALS
HEIGHT: 64 IN | BODY MASS INDEX: 45.77 KG/M2 | RESPIRATION RATE: 16 BRPM | OXYGEN SATURATION: 95 % | WEIGHT: 268.08 LBS | SYSTOLIC BLOOD PRESSURE: 151 MMHG | DIASTOLIC BLOOD PRESSURE: 87 MMHG | HEART RATE: 95 BPM | TEMPERATURE: 98.1 F

## 2019-05-09 DIAGNOSIS — H66.3X1 OTHER CHRONIC SUPPURATIVE OTITIS MEDIA OF RIGHT EAR: Primary | Chronic | ICD-10-CM

## 2019-05-09 LAB
GLUCOSE BLDC GLUCOMTR-MCNC: 93 MG/DL (ref 70–99)
HCG UR QL: NEGATIVE

## 2019-05-09 PROCEDURE — 71000027 ZZH RECOVERY PHASE 2 EACH 15 MINS: Performed by: OTOLARYNGOLOGY

## 2019-05-09 PROCEDURE — 25000125 ZZHC RX 250: Performed by: NURSE ANESTHETIST, CERTIFIED REGISTERED

## 2019-05-09 PROCEDURE — 25000128 H RX IP 250 OP 636: Performed by: OTOLARYNGOLOGY

## 2019-05-09 PROCEDURE — 37000008 ZZH ANESTHESIA TECHNICAL FEE, 1ST 30 MIN: Performed by: OTOLARYNGOLOGY

## 2019-05-09 PROCEDURE — 25000132 ZZH RX MED GY IP 250 OP 250 PS 637: Performed by: OTOLARYNGOLOGY

## 2019-05-09 PROCEDURE — 71000015 ZZH RECOVERY PHASE 1 LEVEL 2 EA ADDTL HR: Performed by: OTOLARYNGOLOGY

## 2019-05-09 PROCEDURE — 25800030 ZZH RX IP 258 OP 636: Performed by: NURSE ANESTHETIST, CERTIFIED REGISTERED

## 2019-05-09 PROCEDURE — 25000128 H RX IP 250 OP 636: Performed by: NURSE ANESTHETIST, CERTIFIED REGISTERED

## 2019-05-09 PROCEDURE — 25000565 ZZH ISOFLURANE, EA 15 MIN: Performed by: OTOLARYNGOLOGY

## 2019-05-09 PROCEDURE — 27210794 ZZH OR GENERAL SUPPLY STERILE: Performed by: OTOLARYNGOLOGY

## 2019-05-09 PROCEDURE — 71000014 ZZH RECOVERY PHASE 1 LEVEL 2 FIRST HR: Performed by: OTOLARYNGOLOGY

## 2019-05-09 PROCEDURE — 27211024 ZZHC OR SUPPLY OTHER OPNP: Performed by: OTOLARYNGOLOGY

## 2019-05-09 PROCEDURE — 25000125 ZZHC RX 250: Performed by: ANESTHESIOLOGY

## 2019-05-09 PROCEDURE — 81025 URINE PREGNANCY TEST: CPT | Performed by: ANESTHESIOLOGY

## 2019-05-09 PROCEDURE — 82962 GLUCOSE BLOOD TEST: CPT

## 2019-05-09 PROCEDURE — 25000125 ZZHC RX 250: Performed by: OTOLARYNGOLOGY

## 2019-05-09 PROCEDURE — 37000009 ZZH ANESTHESIA TECHNICAL FEE, EACH ADDTL 15 MIN: Performed by: OTOLARYNGOLOGY

## 2019-05-09 PROCEDURE — 40000169 ZZH STATISTIC PRE-PROCEDURE ASSESSMENT I: Performed by: OTOLARYNGOLOGY

## 2019-05-09 PROCEDURE — 25000128 H RX IP 250 OP 636: Performed by: ANESTHESIOLOGY

## 2019-05-09 PROCEDURE — 36000064 ZZH SURGERY LEVEL 4 EA 15 ADDTL MIN - UMMC: Performed by: OTOLARYNGOLOGY

## 2019-05-09 PROCEDURE — 36000062 ZZH SURGERY LEVEL 4 1ST 30 MIN - UMMC: Performed by: OTOLARYNGOLOGY

## 2019-05-09 DEVICE — SHEET SILICONE 38MMX51MMX0.13MM  20-10685: Type: IMPLANTABLE DEVICE | Site: EAR | Status: FUNCTIONAL

## 2019-05-09 RX ORDER — DEXAMETHASONE SODIUM PHOSPHATE 4 MG/ML
INJECTION, SOLUTION INTRA-ARTICULAR; INTRALESIONAL; INTRAMUSCULAR; INTRAVENOUS; SOFT TISSUE PRN
Status: DISCONTINUED | OUTPATIENT
Start: 2019-05-09 | End: 2019-05-09

## 2019-05-09 RX ORDER — SCOLOPAMINE TRANSDERMAL SYSTEM 1 MG/1
1 PATCH, EXTENDED RELEASE TRANSDERMAL ONCE
Status: COMPLETED | OUTPATIENT
Start: 2019-05-09 | End: 2019-05-09

## 2019-05-09 RX ORDER — SODIUM CHLORIDE, SODIUM LACTATE, POTASSIUM CHLORIDE, CALCIUM CHLORIDE 600; 310; 30; 20 MG/100ML; MG/100ML; MG/100ML; MG/100ML
INJECTION, SOLUTION INTRAVENOUS CONTINUOUS PRN
Status: DISCONTINUED | OUTPATIENT
Start: 2019-05-09 | End: 2019-05-09

## 2019-05-09 RX ORDER — CEFAZOLIN SODIUM IN 0.9 % NACL 3 G/100 ML
3 INTRAVENOUS SOLUTION, PIGGYBACK (ML) INTRAVENOUS
Status: DISCONTINUED | OUTPATIENT
Start: 2019-05-09 | End: 2019-05-09

## 2019-05-09 RX ORDER — FENTANYL CITRATE 50 UG/ML
INJECTION, SOLUTION INTRAMUSCULAR; INTRAVENOUS PRN
Status: DISCONTINUED | OUTPATIENT
Start: 2019-05-09 | End: 2019-05-09

## 2019-05-09 RX ORDER — FENTANYL CITRATE 50 UG/ML
25-50 INJECTION, SOLUTION INTRAMUSCULAR; INTRAVENOUS
Status: DISCONTINUED | OUTPATIENT
Start: 2019-05-09 | End: 2019-05-09 | Stop reason: HOSPADM

## 2019-05-09 RX ORDER — BACITRACIN ZINC 500 [USP'U]/G
OINTMENT TOPICAL PRN
Status: DISCONTINUED | OUTPATIENT
Start: 2019-05-09 | End: 2019-05-09 | Stop reason: HOSPADM

## 2019-05-09 RX ORDER — LIDOCAINE HYDROCHLORIDE AND EPINEPHRINE 10; 10 MG/ML; UG/ML
INJECTION, SOLUTION INFILTRATION; PERINEURAL PRN
Status: DISCONTINUED | OUTPATIENT
Start: 2019-05-09 | End: 2019-05-09 | Stop reason: HOSPADM

## 2019-05-09 RX ORDER — ACETAMINOPHEN 325 MG/1
975 TABLET ORAL ONCE
Status: DISCONTINUED | OUTPATIENT
Start: 2019-05-09 | End: 2019-05-09 | Stop reason: HOSPADM

## 2019-05-09 RX ORDER — HYDROCODONE BITARTRATE AND ACETAMINOPHEN 5; 325 MG/1; MG/1
1 TABLET ORAL
Status: COMPLETED | OUTPATIENT
Start: 2019-05-09 | End: 2019-05-09

## 2019-05-09 RX ORDER — EPHEDRINE SULFATE 50 MG/ML
INJECTION, SOLUTION INTRAMUSCULAR; INTRAVENOUS; SUBCUTANEOUS PRN
Status: DISCONTINUED | OUTPATIENT
Start: 2019-05-09 | End: 2019-05-09

## 2019-05-09 RX ORDER — HYDROCODONE BITARTRATE AND ACETAMINOPHEN 5; 325 MG/1; MG/1
1-2 TABLET ORAL EVERY 4 HOURS PRN
Qty: 30 TABLET | Refills: 0 | Status: SHIPPED | OUTPATIENT
Start: 2019-05-09 | End: 2019-09-20

## 2019-05-09 RX ORDER — ONDANSETRON 4 MG/1
4 TABLET, ORALLY DISINTEGRATING ORAL EVERY 30 MIN PRN
Status: DISCONTINUED | OUTPATIENT
Start: 2019-05-09 | End: 2019-05-09 | Stop reason: HOSPADM

## 2019-05-09 RX ORDER — NALOXONE HYDROCHLORIDE 0.4 MG/ML
.1-.4 INJECTION, SOLUTION INTRAMUSCULAR; INTRAVENOUS; SUBCUTANEOUS
Status: DISCONTINUED | OUTPATIENT
Start: 2019-05-09 | End: 2019-05-09 | Stop reason: HOSPADM

## 2019-05-09 RX ORDER — MEPERIDINE HYDROCHLORIDE 25 MG/ML
12.5 INJECTION INTRAMUSCULAR; INTRAVENOUS; SUBCUTANEOUS
Status: DISCONTINUED | OUTPATIENT
Start: 2019-05-09 | End: 2019-05-09 | Stop reason: HOSPADM

## 2019-05-09 RX ORDER — HYDROMORPHONE HYDROCHLORIDE 1 MG/ML
.3-.5 INJECTION, SOLUTION INTRAMUSCULAR; INTRAVENOUS; SUBCUTANEOUS EVERY 10 MIN PRN
Status: DISCONTINUED | OUTPATIENT
Start: 2019-05-09 | End: 2019-05-09 | Stop reason: HOSPADM

## 2019-05-09 RX ORDER — ONDANSETRON 2 MG/ML
INJECTION INTRAMUSCULAR; INTRAVENOUS PRN
Status: DISCONTINUED | OUTPATIENT
Start: 2019-05-09 | End: 2019-05-09

## 2019-05-09 RX ORDER — OXYCODONE HYDROCHLORIDE 5 MG/1
5 TABLET ORAL EVERY 4 HOURS PRN
Status: DISCONTINUED | OUTPATIENT
Start: 2019-05-09 | End: 2019-05-09 | Stop reason: HOSPADM

## 2019-05-09 RX ORDER — ONDANSETRON 4 MG/1
4-8 TABLET, ORALLY DISINTEGRATING ORAL EVERY 8 HOURS PRN
Qty: 4 TABLET | Refills: 0 | Status: SHIPPED | OUTPATIENT
Start: 2019-05-09 | End: 2024-09-09

## 2019-05-09 RX ORDER — SODIUM CHLORIDE, SODIUM LACTATE, POTASSIUM CHLORIDE, CALCIUM CHLORIDE 600; 310; 30; 20 MG/100ML; MG/100ML; MG/100ML; MG/100ML
INJECTION, SOLUTION INTRAVENOUS CONTINUOUS
Status: DISCONTINUED | OUTPATIENT
Start: 2019-05-09 | End: 2019-05-09 | Stop reason: HOSPADM

## 2019-05-09 RX ORDER — PROPOFOL 10 MG/ML
INJECTION, EMULSION INTRAVENOUS PRN
Status: DISCONTINUED | OUTPATIENT
Start: 2019-05-09 | End: 2019-05-09

## 2019-05-09 RX ORDER — LIDOCAINE HYDROCHLORIDE 20 MG/ML
INJECTION, SOLUTION INFILTRATION; PERINEURAL PRN
Status: DISCONTINUED | OUTPATIENT
Start: 2019-05-09 | End: 2019-05-09

## 2019-05-09 RX ORDER — CEFAZOLIN SODIUM 2 G/100ML
2 INJECTION, SOLUTION INTRAVENOUS
Status: DISCONTINUED | OUTPATIENT
Start: 2019-05-09 | End: 2019-05-09 | Stop reason: DRUGHIGH

## 2019-05-09 RX ORDER — SCOLOPAMINE TRANSDERMAL SYSTEM 1 MG/1
1 PATCH, EXTENDED RELEASE TRANSDERMAL ONCE
Status: DISCONTINUED | OUTPATIENT
Start: 2019-05-09 | End: 2019-05-09

## 2019-05-09 RX ORDER — CLINDAMYCIN HCL 300 MG
300 CAPSULE ORAL 3 TIMES DAILY
Qty: 30 CAPSULE | Refills: 0 | Status: SHIPPED | OUTPATIENT
Start: 2019-05-09 | End: 2019-09-20

## 2019-05-09 RX ORDER — CEFAZOLIN SODIUM IN 0.9 % NACL 3 G/100 ML
3 INTRAVENOUS SOLUTION, PIGGYBACK (ML) INTRAVENOUS
Status: COMPLETED | OUTPATIENT
Start: 2019-05-09 | End: 2019-05-09

## 2019-05-09 RX ORDER — ONDANSETRON 2 MG/ML
4 INJECTION INTRAMUSCULAR; INTRAVENOUS EVERY 30 MIN PRN
Status: DISCONTINUED | OUTPATIENT
Start: 2019-05-09 | End: 2019-05-09 | Stop reason: HOSPADM

## 2019-05-09 RX ORDER — CEFAZOLIN SODIUM 1 G/3ML
1 INJECTION, POWDER, FOR SOLUTION INTRAMUSCULAR; INTRAVENOUS SEE ADMIN INSTRUCTIONS
Status: DISCONTINUED | OUTPATIENT
Start: 2019-05-09 | End: 2019-05-09 | Stop reason: HOSPADM

## 2019-05-09 RX ORDER — EPINEPHRINE NASAL SOLUTION 1 MG/ML
SOLUTION NASAL PRN
Status: DISCONTINUED | OUTPATIENT
Start: 2019-05-09 | End: 2019-05-09 | Stop reason: HOSPADM

## 2019-05-09 RX ORDER — SULFACETAMIDE SODIUM AND PREDNISOLONE SODIUM PHOSPHATE 100; 2.3 MG/ML; MG/ML
SOLUTION/ DROPS OPHTHALMIC PRN
Status: DISCONTINUED | OUTPATIENT
Start: 2019-05-09 | End: 2019-05-09 | Stop reason: HOSPADM

## 2019-05-09 RX ORDER — LIDOCAINE 40 MG/G
CREAM TOPICAL
Status: DISCONTINUED | OUTPATIENT
Start: 2019-05-09 | End: 2019-05-09 | Stop reason: HOSPADM

## 2019-05-09 RX ADMIN — HYDROMORPHONE HYDROCHLORIDE 0.4 MG: 1 INJECTION, SOLUTION INTRAMUSCULAR; INTRAVENOUS; SUBCUTANEOUS at 14:12

## 2019-05-09 RX ADMIN — FENTANYL CITRATE 50 MCG: 50 INJECTION INTRAMUSCULAR; INTRAVENOUS at 12:57

## 2019-05-09 RX ADMIN — Medication 3 G: at 11:48

## 2019-05-09 RX ADMIN — FENTANYL CITRATE 50 MCG: 50 INJECTION INTRAMUSCULAR; INTRAVENOUS at 13:06

## 2019-05-09 RX ADMIN — FENTANYL CITRATE 50 MCG: 50 INJECTION, SOLUTION INTRAMUSCULAR; INTRAVENOUS at 12:12

## 2019-05-09 RX ADMIN — HYDROMORPHONE HYDROCHLORIDE 0.4 MG: 1 INJECTION, SOLUTION INTRAMUSCULAR; INTRAVENOUS; SUBCUTANEOUS at 13:29

## 2019-05-09 RX ADMIN — SCOPALAMINE 1 PATCH: 1 PATCH, EXTENDED RELEASE TRANSDERMAL at 10:37

## 2019-05-09 RX ADMIN — ROCURONIUM BROMIDE 50 MG: 10 INJECTION INTRAVENOUS at 11:35

## 2019-05-09 RX ADMIN — HYDROCODONE BITARTRATE AND ACETAMINOPHEN 1 TABLET: 5; 325 TABLET ORAL at 14:17

## 2019-05-09 RX ADMIN — FENTANYL CITRATE 100 MCG: 50 INJECTION, SOLUTION INTRAMUSCULAR; INTRAVENOUS at 11:35

## 2019-05-09 RX ADMIN — MIDAZOLAM 2 MG: 1 INJECTION INTRAMUSCULAR; INTRAVENOUS at 11:21

## 2019-05-09 RX ADMIN — Medication 10 MG: at 12:20

## 2019-05-09 RX ADMIN — SUGAMMADEX 250 MG: 100 INJECTION, SOLUTION INTRAVENOUS at 12:42

## 2019-05-09 RX ADMIN — SODIUM CHLORIDE, POTASSIUM CHLORIDE, SODIUM LACTATE AND CALCIUM CHLORIDE: 600; 310; 30; 20 INJECTION, SOLUTION INTRAVENOUS at 11:21

## 2019-05-09 RX ADMIN — DEXAMETHASONE SODIUM PHOSPHATE 8 MG: 4 INJECTION, SOLUTION INTRAMUSCULAR; INTRAVENOUS at 11:54

## 2019-05-09 RX ADMIN — HYDROMORPHONE HYDROCHLORIDE 0.4 MG: 1 INJECTION, SOLUTION INTRAMUSCULAR; INTRAVENOUS; SUBCUTANEOUS at 14:04

## 2019-05-09 RX ADMIN — LIDOCAINE HYDROCHLORIDE 80 MG: 20 INJECTION, SOLUTION INFILTRATION; PERINEURAL at 11:35

## 2019-05-09 RX ADMIN — PROPOFOL 200 MG: 10 INJECTION, EMULSION INTRAVENOUS at 11:35

## 2019-05-09 RX ADMIN — FENTANYL CITRATE 50 MCG: 50 INJECTION, SOLUTION INTRAMUSCULAR; INTRAVENOUS at 12:10

## 2019-05-09 RX ADMIN — ONDANSETRON 4 MG: 2 INJECTION INTRAMUSCULAR; INTRAVENOUS at 12:30

## 2019-05-09 RX ADMIN — HYDROMORPHONE HYDROCHLORIDE 0.4 MG: 1 INJECTION, SOLUTION INTRAMUSCULAR; INTRAVENOUS; SUBCUTANEOUS at 13:17

## 2019-05-09 RX ADMIN — HYDROMORPHONE HYDROCHLORIDE 0.4 MG: 1 INJECTION, SOLUTION INTRAMUSCULAR; INTRAVENOUS; SUBCUTANEOUS at 13:47

## 2019-05-09 ASSESSMENT — MIFFLIN-ST. JEOR: SCORE: 1871

## 2019-05-09 NOTE — ANESTHESIA CARE TRANSFER NOTE
Patient: Elisha Nunez    Procedure(s):  Right Revision Tympanoplasty Flexible Approach, Middle Ear Reconstruction, Myringotomy and Tube  Right Ear Meatoplasty    Diagnosis: Otitis Media Right Ear  Diagnosis Additional Information: No value filed.    Anesthesia Type:   No value filed.     Note:  Airway :Face Mask  Patient transferred to:PACU  Handoff Report: Identifed the Patient, Identified the Reponsible Provider, Reviewed the pertinent medical history, Discussed the surgical course, Reviewed Intra-OP anesthesia mangement and issues during anesthesia, Set expectations for post-procedure period and Allowed opportunity for questions and acknowledgement of understanding      Vitals: (Last set prior to Anesthesia Care Transfer)    CRNA VITALS  5/9/2019 1221 - 5/9/2019 1257      5/9/2019             Pulse:  94    SpO2:  96 %                Electronically Signed By: MARKUS Pickens CRNA  May 9, 2019  12:57 PM

## 2019-05-09 NOTE — BRIEF OP NOTE
[facility], [place]    Brief Operative Note    Pre-operative diagnosis: Otitis Media Right Ear  Post-operative diagnosis Same Procedure: Procedure(s):  Right Revision Tympanoplasty Flexible Approach, Middle Ear Reconstruction, Removal of adhesions, Myringotomy and Tube with #2 Paparella tube, Right Ear Meatoplasty  Surgeon: Surgeon(s) and Role:     * Stephan Crowley MD - Primary     * Letty Perez DO - Assisting  Anesthesia: General   Estimated blood loss: Less than 10 ml  Drains: None  Specimens: * No specimens in log *  Findings:    1. Previous canaloplasty/meatoplasty - revised.  2. Sclerotic mastoid - exposure of dura at lateral aspect of EAC  3. Ossicular chain intact and mobile  4. #2 Paparella tube placed.  5. Middle ear adhesions and thick fluid in epitympanum.  6. Scattered granulation tissue - on TM and in epitympanum.  7. Chorda tympani surgically absent,  Complications: None.  Implants:    Implant Name Type Inv. Item Serial No.  Lot No. LRB No. Used   SHEET SILICONE [id number]  [id number] Other SHEET SILICONE [id number]  [id number]  [facility] [id number] Right 1

## 2019-05-09 NOTE — OP NOTE
Procedure Date: 05/09/2019      PREOPERATIVE DIAGNOSIS:  Right chronic otitis media.      POSTOPERATIVE DIAGNOSIS:  Right chronic otitis media.      PROCEDURES:  Right Type II revision tympanoplasty, flexible approach, middle ear reconstruction, removal of adhesions, meatoplasty, myringotomy and tympanostomy tube placement of #2 Paparella tube, canalplasty, ossicular mobilzation, incision of tensor tympani tendon.      SURGEON:  Stephan Crowley MD.      ASSISTANT:  Letty Perez DO.      ANESTHESIA:  General endotracheal intubation.      ESTIMATED BLOOD LOSS:  10 mL.      SPECIMENS:  None.      DRAINS:  None.      FINDINGS:   1.  Previous canaloplasty/meatoplasty -- revised.   2.  Sclerotic mastoid -- exposure of dura at the lateral aspect of the external auditory canal.   3.  Ossicular chain intact and mobile.   4.  A #2 Paparella tube placed.     5.  Middle ear adhesions and thick fluid in the epitympanum.     6.  Scattered granulation tissue -- on tympanic membrane and epitympanum.   7.  Chorda tympani surgically absent.      COMPLICATIONS:  None.      IMPLANTS:  Silastic sheeting.      PROCEDURE INDICATIONS:  The patient is a 38-year-old female who presents for evaluation of the right ear.  She has a conductive hearing loss and chronic otorrhea from the right ear that is unresolved with multiple antibiotics and drops.  She continues to have drainage and is bothered by her continuous symptoms, in fact, is on disability.  The risks versus benefits and alternatives of the above-mentioned procedures were indicated with the patient, including injury to the facial nerve resulting in facial paralysis, injury to the skull base resulting in CSF fluid, otorrhea, bleeding, infection, need for revision procedures, decreased hearing, deafness, and vertigo.  The patient understood the risks of the procedure and elected to proceed with surgical management.        PROCEDURE IN DETAIL:  After appropriate informed consent was  signed and placed on the chart, the patient was taken to the operative suite and placed in the supine position and endotracheally intubated.  The bed was rotated 90 degrees and the patient was prepped and draped in sterile fashion with Betadine solution.  A timeout was performed to identify the correct patient, procedure, and laterality.  The patient's right ear was injected with 2 mL of 1% lidocaine with epinephrine.  An endaural speculum was used to perform Lempert type 1 and 2 incisions and the mastoid cortex was exposed with a Lempert elevator.  Self-retaining retractors were applied.  A duckbill elevator was used to elevate a tympanomeatal flap anteriorly and the annulus was identified and elevated.  The tympanic membrane remained intact during this procedure.  The ossicular chain was identified and adhesions surrounding the ossicular chain on top of the incus were lysed with a joint knife.  There was granulation tissue in the epitympanum, which was removed with a cup forceps.  Fluid was noted to be emanating from around the ossicular chain.  The previously placed tube was then removed with a cup forcep and granulation tissue was noted partially occluding the tube.  This was also removed with a cup forcep.  The remainder of the middle ear space appeared to be healthy and the previously noted completely obstructive granulation tissue has since resolved.  The above-mentioned findings were noted.  Silastic was incised in the middle ear space and placed along the promontory for middle ear reconstruction.  The fresh #2 Paparella tube was inserted through the previous myringotomy with an alligator forceps and a pick.  The tympanomeatal flap was then replaced in its normal anatomic location and EpiFilm was placed over top of the incus to prevent future adhesions.  Packing was then placed along the tympanic membrane to recreate the anterior sulcus and the endaural incision was then closed in multiple layers with a 3-0  Vicryl suture to reapproximate the subcutaneous tissue and a 3-0 nylon suture to reapproximate the skin.  Vaseline-soaked gauze was then placed in the lateral aspect of the external auditory canal to maintain the meatoplasty.  The patient's ear was then sterilely dressed and then returned to Anesthesia for uncomplicated emergence.  The patient tolerated the procedure well and there were no complications.  Dr. Crowley was present and performed the entire procedure.         ESTEPHANIA CROWLEY MD       As dictated by MATT KESSLER DO            D: 2019   T: 2019   MT: ZOE      Name:     INGRIS GUTIERREZ   MRN:      [id number]        Account:        [id number]   :      1980           Procedure Date: 2019      Document: [id number]

## 2019-05-09 NOTE — ANESTHESIA POSTPROCEDURE EVALUATION
Anesthesia POST Procedure Evaluation    Patient: Elisha Nunez   MRN:     [id number] Gender:   female   Age:    38 year old :      1980        Preoperative Diagnosis: Otitis Media Right Ear   Procedure(s):  Right Revision Tympanoplasty Flexible Approach, Middle Ear Reconstruction, Myringotomy and Tube  Right Ear Meatoplasty   Postop Comments: No value filed.       Anesthesia Type:  General  No value filed.    Reportable Event: NO     PAIN: Uncomplicated   Sign Out status: Comfortable, Well controlled pain     PONV: No PONV   Sign Out status:  No Nausea or Vomiting     Neuro/Psych: Uneventful perioperative course   Sign Out Status: Preoperative baseline; Age appropriate mentation     Airway/Resp.: Uneventful perioperative course   Sign Out Status: Non labored breathing, age appropriate RR; Resp. Status within EXPECTED Parameters     CV: Uneventful perioperative course   Sign Out status: Appropriate BP and perfusion indices; Appropriate HR/Rhythm     Disposition:   Sign Out in:  PACU  Disposition:  Phase II; Home  Recovery Course: Uneventful  Follow-Up: Not required           Last Anesthesia Record Vitals:  CRNA VITALS  2019 1221 - 2019 1321      2019             Pulse:  94    SpO2:  96 %          Last PACU Vitals:  Vitals Value Taken Time   /97 2019  2:20 PM   Temp 37.2  C (99  F) 2019  1:45 PM   Pulse 85 2019  2:20 PM   Resp 17 2019  2:21 PM   SpO2 95 % 2019  2:40 PM   Temp src     NIBP     Pulse     SpO2     Resp     Temp     Ht Rate     Temp 2     Vitals shown include unvalidated device data.      Electronically Signed By: Fransisco Domínguez MD, May 9, 2019, 2:41 PM

## 2019-05-09 NOTE — DISCHARGE INSTRUCTIONS
Same-Day Surgery   Adult Discharge Orders & Instructions     For 24 hours after surgery:  1. Get plenty of rest.  A responsible adult must stay with you for at least 24 hours after you leave the hospital.   2. Pain medication can slow your reflexes. Do not drive or use heavy equipment.  If you have weakness or tingling, don't drive or use heavy equipment until this feeling goes away.  3. Mixing alcohol and pain medication can cause dizziness and slow your breathing. It can even be fatal. Do not drink alcohol while taking pain medication.  4. Avoid strenuous or risky activities.  Ask for help when climbing stairs.   5. You may feel lightheaded.  If so, sit for a few minutes before standing.  Have someone help you get up.   6. If you have nausea (feel sick to your stomach), drink only clear liquids such as apple juice, ginger ale, broth or 7-Up.  Rest may also help.  Be sure to drink enough fluids.  Move to a regular diet as you feel able. Take pain medications with a small amount of solid food, such as toast or crackers, to avoid nausea.   7. A slight fever is normal. Call the doctor if your fever is over 100 F (37.7 C) (taken under the tongue) or lasts longer than 24 hours.  8. You may have a dry mouth, muscle aches, trouble sleeping or a sore throat.  These symptoms should go away after 24 hours.  9. Do not make important or legal decisions.   Pain Management:      1. Take pain medication (if prescribed) for pain as directed by your physician.        2. WARNING: If the pain medication you have been prescribed contains Tylenol  (acetaminophen), DO NOT take additional doses of Tylenol (acetaminophen).     Call your doctor for any of the followin.  Signs of infection (fever, growing tenderness at the surgery site, severe pain, a large amount of drainage or bleeding, foul-smelling drainage, redness, swelling).    2.  It has been over 8 to 10 hours since surgery and you are still not able to urinate (pee).    3.   Headache for over 24 hours.    4.  Numbness, tingling or weakness the day after surgery (if you had spinal anesthesia).  To contact a doctor, call Dr. Crowley's clinic _ or:      [phone] and ask for the Resident On Call for:          ENT  (answered 24 hours a day)      Emergency Department:  [place] [facility]: [phone]  [place] [facility]: [phone]               Rev. 10/2014

## 2019-08-27 ENCOUNTER — TRANSFERRED RECORDS (OUTPATIENT)
Dept: HEALTH INFORMATION MANAGEMENT | Facility: CLINIC | Age: 39
End: 2019-08-27

## 2019-09-19 RX ORDER — CEFAZOLIN SODIUM IN 0.9 % NACL 3 G/100 ML
3 INTRAVENOUS SOLUTION, PIGGYBACK (ML) INTRAVENOUS
Status: CANCELLED | OUTPATIENT
Start: 2019-09-19

## 2019-09-19 RX ORDER — CEFAZOLIN SODIUM 1 G/3ML
1 INJECTION, POWDER, FOR SOLUTION INTRAMUSCULAR; INTRAVENOUS SEE ADMIN INSTRUCTIONS
Status: CANCELLED | OUTPATIENT
Start: 2019-09-19

## 2019-09-20 RX ORDER — HYDROXYZINE HYDROCHLORIDE 25 MG/1
25 TABLET, FILM COATED ORAL 3 TIMES DAILY PRN
COMMUNITY

## 2019-09-20 RX ORDER — PROPRANOLOL HYDROCHLORIDE 10 MG/1
10 TABLET ORAL 2 TIMES DAILY
COMMUNITY
End: 2024-09-09

## 2019-09-20 RX ORDER — SERTRALINE HYDROCHLORIDE 100 MG/1
200 TABLET, FILM COATED ORAL
COMMUNITY

## 2019-09-20 RX ORDER — ERGOCALCIFEROL 1.25 MG/1
50000 CAPSULE ORAL
COMMUNITY
End: 2024-09-09

## 2019-09-22 ENCOUNTER — ANESTHESIA EVENT (OUTPATIENT)
Dept: SURGERY | Facility: CLINIC | Age: 39
End: 2019-09-22
Payer: COMMERCIAL

## 2019-09-22 NOTE — ANESTHESIA PREPROCEDURE EVALUATION
Anesthesia Pre-Procedure Evaluation    Patient: Elisha Nunez   MRN:     [id number] Gender:   female   Age:    39 year old :      1980        Preoperative Diagnosis: Deviated Septum, Hypertrophy Turbinates, Chronic Sinusitis   Procedure(s):  Septoplasty, Bilateral Maxillary Antrostomy  Maxillary Antrostomy  Bilateral Anterior Ethmoidectomy     Past Medical History:   Diagnosis Date     Anxiety      Depression      Elevated liver enzymes 2015    ultrasound showed enlarged liver.      Hearing loss      Noninfectious ileitis      Otitis media      PONV (postoperative nausea and vomiting)       Past Surgical History:   Procedure Laterality Date     CANALPLASTY Right 2018    Procedure: CANALPLASTY;;  Surgeon: Stephan Crowley MD;  Location: [facility]     GRAFT THIERSCH STATUS POST TYMPANOMASTOIDECTOMY Right 10/23/2017    Procedure: GRAFT THIERSCH STATUS POST TYMPANOMASTOIDECTOMY;  Right Removal of Granulation Tissue, Removal of Packing and Sutures, Thiersch Graft;  Surgeon: Stephan Crowley MD;  [facility]: [facility]     GRAFT THIERSCH STATUS POST TYMPANOMASTOIDECTOMY Right 2018    Procedure: GRAFT THIERSCH STATUS POST TYMPANOMASTOIDECTOMY;  Right Ear Removal Of Granulation Tissue,  Canalplasty,   Meatoplasty,   Right Thiersch Graft,  Removal of Right Ear Existing Pressure Equalization Tube,  Myringotomy with placement of Paparella #2 Pressure Equalization Tube,  Myringoplasty  ;  Surgeon: Stephan Crowley MD;  [facility]     MASTOIDECTOMY Right 10/12/2017    Procedure: MASTOIDECTOMY;;  Surgeon: Stephan Crowley MD;  [facility]     MEATOPLASTY EAR Right 10/12/2017    Procedure: MEATOPLASTY EAR;;  Surgeon: Stephan Crowley MD;  [facility]     MEATOPLASTY EAR Right 2019    Procedure: Right Ear Meatoplasty;  Surgeon: Stephan Crowley MD;  [facility]     MYRINGOTOMY, INSERT TUBE BILATERAL, COMBINED Bilateral 2015    Procedure: COMBINED MYRINGOTOMY, INSERT TUBE  BILATERAL;  Surgeon: Stephan Crowley MD;  [facility]     PE TUBES       TONSILLECTOMY       TYMPANOPLASTY Left 5/14/2015    Procedure: TYMPANOPLASTY;  Surgeon: Stephan Crowley MD;  [facility]     TYMPANOPLASTY Right 10/12/2017    Procedure: TYMPANOPLASTY;  Right Endural Flexiable Tympanoplasty, Middle Ear Reconstruction,Mastoidectomy, Myringotomy and Tube (#2 Paparella tube), Meatoplasty ;  Surgeon: Stephan Crowley MD;  [facility]     TYMPANOPLASTY Right 5/9/2019    Procedure: Right Revision Tympanoplasty Flexible Approach, Middle Ear Reconstruction, Myringotomy and Tube;  Surgeon: Stephan Crowley MD;  [facility]          Anesthesia Evaluation     .             ROS/MED HX    ENT/Pulmonary:     (+)DARIUSZ risk factors obese, , . .    Neurologic:       Cardiovascular:  - neg cardiovascular ROS       METS/Exercise Tolerance:  >4 METS   Hematologic:  - neg hematologic  ROS       Musculoskeletal:  - neg musculoskeletal ROS       GI/Hepatic: Comment: Elevated LFTs, hepatomegaly        Renal/Genitourinary:  - ROS Renal section negative       Endo:     (+) Obesity, .      Psychiatric:  - neg psychiatric ROS       Infectious Disease:  - neg infectious disease ROS       Malignancy:         Other:                         PHYSICAL EXAM:   Mental Status/Neuro: A/A/O   Airway: Facies: Feasible  Mallampati: I  Mouth/Opening: Full  TM distance: > 6 cm  Neck ROM: Full   Respiratory: Auscultation: CTAB     Resp. Rate: Normal     Resp. Effort: Normal      CV: Rhythm: Regular  Rate: Age appropriate  Heart: Normal Sounds  Edema: None   Comments:      Dental: Normal Dentition                LABS:  CBC:   Lab Results   Component Value Date    WBC 8.0 06/28/2016    HGB 13.9 06/28/2016    HCT 40.6 06/28/2016     06/28/2016     BMP:   Lab Results   Component Value Date     06/28/2016    POTASSIUM 4.5 09/10/2018    POTASSIUM 3.8 06/28/2016    CHLORIDE 108 06/28/2016    CO2 22 06/28/2016    BUN 8  "06/28/2016    CR 0.78 09/10/2018    CR 0.59 06/28/2016     (H) 09/10/2018    GLC 75 06/28/2016     COAGS: No results found for: PTT, INR, FIBR  POC:   Lab Results   Component Value Date    BGM 93 05/09/2019    HCG Negative 05/09/2019     OTHER:   Lab Results   Component Value Date    A1C 5.4 09/10/2018    SARINA 9.0 06/28/2016    ALBUMIN 4.0 06/28/2016    PROTTOTAL 8.2 06/28/2016    ALT 49 06/28/2016    AST 72 (H) 06/28/2016    ALKPHOS 94 06/28/2016    BILITOTAL 0.3 06/28/2016        Preop Vitals    BP Readings from Last 3 Encounters:   05/09/19 151/87   09/17/18 119/67   03/01/18 163/77    Pulse Readings from Last 3 Encounters:   05/09/19 95   03/01/18 84   10/12/17 105      Resp Readings from Last 3 Encounters:   05/09/19 16   09/17/18 14   03/01/18 16    SpO2 Readings from Last 3 Encounters:   05/09/19 95%   09/17/18 97%   03/01/18 97%      Temp Readings from Last 1 Encounters:   05/09/19 36.7  C (98.1  F) (Oral)    Ht Readings from Last 1 Encounters:   05/09/19 1.626 m (5' 4\")      Wt Readings from Last 1 Encounters:   05/09/19 121.6 kg (268 lb 1.3 oz)    Estimated body mass index is 46.02 kg/m  as calculated from the following:    Height as of 5/9/19: 1.626 m (5' 4\").    Weight as of 5/9/19: 121.6 kg (268 lb 1.3 oz).     LDA:        Assessment:   ASA SCORE: 2    H&P: History and physical reviewed and following examination; no interval change.    NPO Status: NPO Appropriate     Plan:   Anes. Type:  General   Pre-Medication: None   Induction:  IV (Standard)   Airway: ETT; Oral; CHRISTIAN   Access/Monitoring: PIV   Maintenance: Balanced     Postop Plan:   Postop Pain: Opioids  Postop Sedation/Airway: Not planned  Disposition: Outpatient     PONV Management:   Adult Risk Factors: Female, H/o PONV or Motion Sickness, Postop Opioids   Prevention: Ondansetron, Dexamethasone, Scopolamine     CONSENT: Direct conversation   Plan and risks discussed with: Patient   Blood Products: Consented (ALL Blood Products)              "      Duane Villa MD

## 2019-09-23 ENCOUNTER — ANESTHESIA (OUTPATIENT)
Dept: SURGERY | Facility: CLINIC | Age: 39
End: 2019-09-23
Payer: COMMERCIAL

## 2019-09-23 ENCOUNTER — HOSPITAL ENCOUNTER (OUTPATIENT)
Facility: CLINIC | Age: 39
Discharge: HOME | End: 2019-09-23
Attending: OTOLARYNGOLOGY | Admitting: OTOLARYNGOLOGY
Payer: COMMERCIAL

## 2019-09-23 VITALS
RESPIRATION RATE: 15 BRPM | HEIGHT: 63 IN | SYSTOLIC BLOOD PRESSURE: 147 MMHG | BODY MASS INDEX: 50.86 KG/M2 | WEIGHT: 287.04 LBS | OXYGEN SATURATION: 98 % | DIASTOLIC BLOOD PRESSURE: 84 MMHG | HEART RATE: 81 BPM | TEMPERATURE: 98.2 F

## 2019-09-23 DIAGNOSIS — J32.2 CHRONIC ETHMOIDAL SINUSITIS: ICD-10-CM

## 2019-09-23 DIAGNOSIS — J32.0 CHRONIC MAXILLARY SINUSITIS: ICD-10-CM

## 2019-09-23 DIAGNOSIS — J32.0 CHRONIC SINUSITIS OF BOTH MAXILLARY SINUSES: ICD-10-CM

## 2019-09-23 DIAGNOSIS — J34.2 DEVIATED NASAL SEPTUM: ICD-10-CM

## 2019-09-23 DIAGNOSIS — Z98.890 S/P NASAL SURGERY: Primary | ICD-10-CM

## 2019-09-23 DIAGNOSIS — J34.3 HYPERTROPHY OF BOTH INFERIOR NASAL TURBINATES: ICD-10-CM

## 2019-09-23 LAB
GLUCOSE BLDC GLUCOMTR-MCNC: 119 MG/DL (ref 70–99)
HCG UR QL: NEGATIVE

## 2019-09-23 PROCEDURE — 25000132 ZZH RX MED GY IP 250 OP 250 PS 637: Performed by: OTOLARYNGOLOGY

## 2019-09-23 PROCEDURE — 25000125 ZZHC RX 250: Performed by: OTOLARYNGOLOGY

## 2019-09-23 PROCEDURE — 36000062 ZZH SURGERY LEVEL 4 1ST 30 MIN - UMMC: Performed by: OTOLARYNGOLOGY

## 2019-09-23 PROCEDURE — 25800030 ZZH RX IP 258 OP 636: Performed by: ANESTHESIOLOGY

## 2019-09-23 PROCEDURE — 25000132 ZZH RX MED GY IP 250 OP 250 PS 637: Performed by: ANESTHESIOLOGY

## 2019-09-23 PROCEDURE — 25800030 ZZH RX IP 258 OP 636: Performed by: NURSE ANESTHETIST, CERTIFIED REGISTERED

## 2019-09-23 PROCEDURE — 25000128 H RX IP 250 OP 636: Performed by: NURSE ANESTHETIST, CERTIFIED REGISTERED

## 2019-09-23 PROCEDURE — 40000170 ZZH STATISTIC PRE-PROCEDURE ASSESSMENT II: Performed by: OTOLARYNGOLOGY

## 2019-09-23 PROCEDURE — 71000027 ZZH RECOVERY PHASE 2 EACH 15 MINS: Performed by: OTOLARYNGOLOGY

## 2019-09-23 PROCEDURE — 37000009 ZZH ANESTHESIA TECHNICAL FEE, EACH ADDTL 15 MIN: Performed by: OTOLARYNGOLOGY

## 2019-09-23 PROCEDURE — 37000008 ZZH ANESTHESIA TECHNICAL FEE, 1ST 30 MIN: Performed by: OTOLARYNGOLOGY

## 2019-09-23 PROCEDURE — 25000125 ZZHC RX 250: Performed by: NURSE ANESTHETIST, CERTIFIED REGISTERED

## 2019-09-23 PROCEDURE — 82962 GLUCOSE BLOOD TEST: CPT

## 2019-09-23 PROCEDURE — 27210794 ZZH OR GENERAL SUPPLY STERILE: Performed by: OTOLARYNGOLOGY

## 2019-09-23 PROCEDURE — 25000566 ZZH SEVOFLURANE, EA 15 MIN: Performed by: OTOLARYNGOLOGY

## 2019-09-23 PROCEDURE — 71000015 ZZH RECOVERY PHASE 1 LEVEL 2 EA ADDTL HR: Performed by: OTOLARYNGOLOGY

## 2019-09-23 PROCEDURE — 25000128 H RX IP 250 OP 636: Performed by: OTOLARYNGOLOGY

## 2019-09-23 PROCEDURE — 25000125 ZZHC RX 250: Performed by: ANESTHESIOLOGY

## 2019-09-23 PROCEDURE — 36000064 ZZH SURGERY LEVEL 4 EA 15 ADDTL MIN - UMMC: Performed by: OTOLARYNGOLOGY

## 2019-09-23 PROCEDURE — 81025 URINE PREGNANCY TEST: CPT | Performed by: ANESTHESIOLOGY

## 2019-09-23 PROCEDURE — 71000014 ZZH RECOVERY PHASE 1 LEVEL 2 FIRST HR: Performed by: OTOLARYNGOLOGY

## 2019-09-23 PROCEDURE — 25000128 H RX IP 250 OP 636: Performed by: ANESTHESIOLOGY

## 2019-09-23 RX ORDER — MUPIROCIN 20 MG/G
OINTMENT TOPICAL PRN
Status: DISCONTINUED | OUTPATIENT
Start: 2019-09-23 | End: 2019-09-23 | Stop reason: HOSPADM

## 2019-09-23 RX ORDER — OXYMETAZOLINE HYDROCHLORIDE 0.05 G/100ML
2 SPRAY NASAL
Status: DISCONTINUED | OUTPATIENT
Start: 2019-09-23 | End: 2019-09-23

## 2019-09-23 RX ORDER — ONDANSETRON 2 MG/ML
4 INJECTION INTRAMUSCULAR; INTRAVENOUS EVERY 30 MIN PRN
Status: DISCONTINUED | OUTPATIENT
Start: 2019-09-23 | End: 2019-09-23

## 2019-09-23 RX ORDER — ACETAMINOPHEN 325 MG/1
975 TABLET ORAL ONCE
Status: COMPLETED | OUTPATIENT
Start: 2019-09-23 | End: 2019-09-23

## 2019-09-23 RX ORDER — LIDOCAINE HYDROCHLORIDE 20 MG/ML
INJECTION, SOLUTION INFILTRATION; PERINEURAL PRN
Status: DISCONTINUED | OUTPATIENT
Start: 2019-09-23 | End: 2019-09-23

## 2019-09-23 RX ORDER — ONDANSETRON 2 MG/ML
4 INJECTION INTRAMUSCULAR; INTRAVENOUS EVERY 30 MIN PRN
Status: DISCONTINUED | OUTPATIENT
Start: 2019-09-23 | End: 2019-09-23 | Stop reason: HOSPADM

## 2019-09-23 RX ORDER — LIDOCAINE HYDROCHLORIDE AND EPINEPHRINE 10; 10 MG/ML; UG/ML
INJECTION, SOLUTION INFILTRATION; PERINEURAL PRN
Status: DISCONTINUED | OUTPATIENT
Start: 2019-09-23 | End: 2019-09-23 | Stop reason: HOSPADM

## 2019-09-23 RX ORDER — CEFAZOLIN SODIUM IN 0.9 % NACL 3 G/100 ML
3 INTRAVENOUS SOLUTION, PIGGYBACK (ML) INTRAVENOUS
Status: COMPLETED | OUTPATIENT
Start: 2019-09-23 | End: 2019-09-23

## 2019-09-23 RX ORDER — PROPOFOL 10 MG/ML
INJECTION, EMULSION INTRAVENOUS PRN
Status: DISCONTINUED | OUTPATIENT
Start: 2019-09-23 | End: 2019-09-23

## 2019-09-23 RX ORDER — GABAPENTIN 100 MG/1
300 CAPSULE ORAL ONCE
Status: COMPLETED | OUTPATIENT
Start: 2019-09-23 | End: 2019-09-23

## 2019-09-23 RX ORDER — SODIUM CHLORIDE, SODIUM LACTATE, POTASSIUM CHLORIDE, CALCIUM CHLORIDE 600; 310; 30; 20 MG/100ML; MG/100ML; MG/100ML; MG/100ML
INJECTION, SOLUTION INTRAVENOUS CONTINUOUS
Status: DISCONTINUED | OUTPATIENT
Start: 2019-09-23 | End: 2019-09-23 | Stop reason: HOSPADM

## 2019-09-23 RX ORDER — HYDROMORPHONE HYDROCHLORIDE 1 MG/ML
.3-.5 INJECTION, SOLUTION INTRAMUSCULAR; INTRAVENOUS; SUBCUTANEOUS EVERY 5 MIN PRN
Status: DISCONTINUED | OUTPATIENT
Start: 2019-09-23 | End: 2019-09-23 | Stop reason: HOSPADM

## 2019-09-23 RX ORDER — FENTANYL CITRATE 50 UG/ML
INJECTION, SOLUTION INTRAMUSCULAR; INTRAVENOUS PRN
Status: DISCONTINUED | OUTPATIENT
Start: 2019-09-23 | End: 2019-09-23

## 2019-09-23 RX ORDER — NALOXONE HYDROCHLORIDE 0.4 MG/ML
.1-.4 INJECTION, SOLUTION INTRAMUSCULAR; INTRAVENOUS; SUBCUTANEOUS
Status: DISCONTINUED | OUTPATIENT
Start: 2019-09-23 | End: 2019-09-23 | Stop reason: HOSPADM

## 2019-09-23 RX ORDER — PROPOFOL 10 MG/ML
INJECTION, EMULSION INTRAVENOUS CONTINUOUS PRN
Status: DISCONTINUED | OUTPATIENT
Start: 2019-09-23 | End: 2019-09-23

## 2019-09-23 RX ORDER — OXYMETAZOLINE HYDROCHLORIDE 0.05 G/100ML
2 SPRAY NASAL
Status: COMPLETED | OUTPATIENT
Start: 2019-09-23 | End: 2019-09-23

## 2019-09-23 RX ORDER — ONDANSETRON 2 MG/ML
INJECTION INTRAMUSCULAR; INTRAVENOUS PRN
Status: DISCONTINUED | OUTPATIENT
Start: 2019-09-23 | End: 2019-09-23

## 2019-09-23 RX ORDER — OXYCODONE AND ACETAMINOPHEN 5; 325 MG/1; MG/1
1-2 TABLET ORAL EVERY 6 HOURS PRN
Qty: 10 TABLET | Refills: 0 | Status: SHIPPED | OUTPATIENT
Start: 2019-09-23 | End: 2019-09-26

## 2019-09-23 RX ORDER — NALOXONE HYDROCHLORIDE 0.4 MG/ML
.1-.4 INJECTION, SOLUTION INTRAMUSCULAR; INTRAVENOUS; SUBCUTANEOUS
Status: DISCONTINUED | OUTPATIENT
Start: 2019-09-23 | End: 2019-09-23

## 2019-09-23 RX ORDER — FENTANYL CITRATE 50 UG/ML
25-50 INJECTION, SOLUTION INTRAMUSCULAR; INTRAVENOUS
Status: DISCONTINUED | OUTPATIENT
Start: 2019-09-23 | End: 2019-09-23 | Stop reason: HOSPADM

## 2019-09-23 RX ORDER — SODIUM CHLORIDE, SODIUM LACTATE, POTASSIUM CHLORIDE, CALCIUM CHLORIDE 600; 310; 30; 20 MG/100ML; MG/100ML; MG/100ML; MG/100ML
INJECTION, SOLUTION INTRAVENOUS CONTINUOUS
Status: DISCONTINUED | OUTPATIENT
Start: 2019-09-23 | End: 2019-09-23

## 2019-09-23 RX ORDER — OXYMETAZOLINE HYDROCHLORIDE 0.05 G/100ML
SPRAY NASAL PRN
Status: DISCONTINUED | OUTPATIENT
Start: 2019-09-23 | End: 2019-09-23 | Stop reason: HOSPADM

## 2019-09-23 RX ORDER — ONDANSETRON 4 MG/1
4 TABLET, ORALLY DISINTEGRATING ORAL EVERY 30 MIN PRN
Status: DISCONTINUED | OUTPATIENT
Start: 2019-09-23 | End: 2019-09-23

## 2019-09-23 RX ORDER — LABETALOL HYDROCHLORIDE 5 MG/ML
10 INJECTION, SOLUTION INTRAVENOUS
Status: COMPLETED | OUTPATIENT
Start: 2019-09-23 | End: 2019-09-23

## 2019-09-23 RX ORDER — ALBUTEROL SULFATE 0.83 MG/ML
2.5 SOLUTION RESPIRATORY (INHALATION) EVERY 4 HOURS PRN
Status: DISCONTINUED | OUTPATIENT
Start: 2019-09-23 | End: 2019-09-23 | Stop reason: HOSPADM

## 2019-09-23 RX ORDER — DEXAMETHASONE SODIUM PHOSPHATE 4 MG/ML
INJECTION, SOLUTION INTRA-ARTICULAR; INTRALESIONAL; INTRAMUSCULAR; INTRAVENOUS; SOFT TISSUE PRN
Status: DISCONTINUED | OUTPATIENT
Start: 2019-09-23 | End: 2019-09-23

## 2019-09-23 RX ORDER — OXYCODONE AND ACETAMINOPHEN 5; 325 MG/1; MG/1
1-2 TABLET ORAL ONCE
Status: COMPLETED | OUTPATIENT
Start: 2019-09-23 | End: 2019-09-23

## 2019-09-23 RX ORDER — SCOLOPAMINE TRANSDERMAL SYSTEM 1 MG/1
1 PATCH, EXTENDED RELEASE TRANSDERMAL ONCE
Status: COMPLETED | OUTPATIENT
Start: 2019-09-23 | End: 2019-09-23

## 2019-09-23 RX ORDER — ONDANSETRON 4 MG/1
4 TABLET, ORALLY DISINTEGRATING ORAL EVERY 30 MIN PRN
Status: DISCONTINUED | OUTPATIENT
Start: 2019-09-23 | End: 2019-09-23 | Stop reason: HOSPADM

## 2019-09-23 RX ORDER — LIDOCAINE 40 MG/G
CREAM TOPICAL
Status: DISCONTINUED | OUTPATIENT
Start: 2019-09-23 | End: 2019-09-23 | Stop reason: HOSPADM

## 2019-09-23 RX ORDER — FENTANYL CITRATE 50 UG/ML
25-50 INJECTION, SOLUTION INTRAMUSCULAR; INTRAVENOUS
Status: DISCONTINUED | OUTPATIENT
Start: 2019-09-23 | End: 2019-09-23

## 2019-09-23 RX ORDER — DEXAMETHASONE SODIUM PHOSPHATE 10 MG/ML
10 INJECTION, SOLUTION INTRAMUSCULAR; INTRAVENOUS ONCE
Status: DISCONTINUED | OUTPATIENT
Start: 2019-09-23 | End: 2019-09-23 | Stop reason: HOSPADM

## 2019-09-23 RX ORDER — LABETALOL HYDROCHLORIDE 5 MG/ML
5 INJECTION, SOLUTION INTRAVENOUS ONCE
Status: COMPLETED | OUTPATIENT
Start: 2019-09-23 | End: 2019-09-23

## 2019-09-23 RX ADMIN — LIDOCAINE HYDROCHLORIDE 100 MG: 20 INJECTION, SOLUTION INFILTRATION; PERINEURAL at 08:00

## 2019-09-23 RX ADMIN — PROPOFOL 20 MCG/KG/MIN: 10 INJECTION, EMULSION INTRAVENOUS at 08:27

## 2019-09-23 RX ADMIN — ONDANSETRON 4 MG: 2 INJECTION INTRAMUSCULAR; INTRAVENOUS at 11:59

## 2019-09-23 RX ADMIN — FENTANYL CITRATE 100 MCG: 50 INJECTION, SOLUTION INTRAMUSCULAR; INTRAVENOUS at 08:00

## 2019-09-23 RX ADMIN — ACETAMINOPHEN 975 MG: 325 TABLET, FILM COATED ORAL at 06:42

## 2019-09-23 RX ADMIN — OXYMETAZOLINE HYDROCHLORIDE 2 SPRAY: 0.05 SPRAY NASAL at 06:52

## 2019-09-23 RX ADMIN — PROPOFOL 200 MG: 10 INJECTION, EMULSION INTRAVENOUS at 08:00

## 2019-09-23 RX ADMIN — GABAPENTIN 300 MG: 300 CAPSULE ORAL at 06:42

## 2019-09-23 RX ADMIN — SCOPALAMINE 1 PATCH: 1 PATCH, EXTENDED RELEASE TRANSDERMAL at 06:42

## 2019-09-23 RX ADMIN — HYDROMORPHONE HYDROCHLORIDE 0.5 MG: 1 INJECTION, SOLUTION INTRAMUSCULAR; INTRAVENOUS; SUBCUTANEOUS at 11:58

## 2019-09-23 RX ADMIN — OXYCODONE HYDROCHLORIDE AND ACETAMINOPHEN 1 TABLET: 5; 325 TABLET ORAL at 13:09

## 2019-09-23 RX ADMIN — MIDAZOLAM 2 MG: 1 INJECTION INTRAMUSCULAR; INTRAVENOUS at 07:33

## 2019-09-23 RX ADMIN — DEXAMETHASONE SODIUM PHOSPHATE 10 MG: 4 INJECTION, SOLUTION INTRAMUSCULAR; INTRAVENOUS at 08:24

## 2019-09-23 RX ADMIN — FENTANYL CITRATE 25 MCG: 50 INJECTION INTRAMUSCULAR; INTRAVENOUS at 10:10

## 2019-09-23 RX ADMIN — Medication 3 G: at 07:45

## 2019-09-23 RX ADMIN — FENTANYL CITRATE 25 MCG: 50 INJECTION INTRAMUSCULAR; INTRAVENOUS at 10:38

## 2019-09-23 RX ADMIN — PROPOFOL 35 MG: 10 INJECTION, EMULSION INTRAVENOUS at 08:03

## 2019-09-23 RX ADMIN — LABETALOL 20 MG/4 ML (5 MG/ML) INTRAVENOUS SYRINGE 10 MG: at 11:10

## 2019-09-23 RX ADMIN — OXYMETAZOLINE HYDROCHLORIDE 2 SPRAY: 0.05 SPRAY NASAL at 06:58

## 2019-09-23 RX ADMIN — HYDROMORPHONE HYDROCHLORIDE 0.5 MG: 1 INJECTION, SOLUTION INTRAMUSCULAR; INTRAVENOUS; SUBCUTANEOUS at 11:16

## 2019-09-23 RX ADMIN — SODIUM CHLORIDE, POTASSIUM CHLORIDE, SODIUM LACTATE AND CALCIUM CHLORIDE: 600; 310; 30; 20 INJECTION, SOLUTION INTRAVENOUS at 07:33

## 2019-09-23 RX ADMIN — LABETALOL 20 MG/4 ML (5 MG/ML) INTRAVENOUS SYRINGE 5 MG: at 11:35

## 2019-09-23 RX ADMIN — SUGAMMADEX 200 MG: 100 INJECTION, SOLUTION INTRAVENOUS at 09:36

## 2019-09-23 RX ADMIN — FAMOTIDINE 20 MG: 10 INJECTION, SOLUTION INTRAVENOUS at 09:36

## 2019-09-23 RX ADMIN — ONDANSETRON 4 MG: 2 INJECTION INTRAMUSCULAR; INTRAVENOUS at 09:29

## 2019-09-23 RX ADMIN — ROCURONIUM BROMIDE 50 MG: 10 INJECTION INTRAVENOUS at 08:05

## 2019-09-23 RX ADMIN — FENTANYL CITRATE 50 MCG: 50 INJECTION INTRAMUSCULAR; INTRAVENOUS at 10:23

## 2019-09-23 RX ADMIN — OXYMETAZOLINE HYDROCHLORIDE 2 SPRAY: 0.05 SPRAY NASAL at 06:43

## 2019-09-23 ASSESSMENT — MIFFLIN-ST. JEOR: SCORE: 1936.13

## 2019-09-23 NOTE — ANESTHESIA POSTPROCEDURE EVALUATION
Anesthesia POST Procedure Evaluation    Patient: Elisha Nunez   MRN:     [id number] Gender:   female   Age:    39 year old :      1980        Preoperative Diagnosis: Deviated Septum, Hypertrophy Turbinates, Chronic Sinusitis   Procedure(s):  Septoplasty, Bilateral Inferior Turbinate Reduction  Bilateral Maxillary Antrostomy  Bilateral Anterior Ethmoidectomy   Postop Comments: No value filed.       Anesthesia Type:  Not documented  General    JZG [facility] AN POST EVALUATION    Last Anesthesia Record Vitals:  CRNA VITALS  2019 0919 - 2019 1019      2019             NIBP:  133/76    Pulse:  82    Temp:  36.9  C (98.4  F)    SpO2:  100 %    Resp Rate (observed):  16          Last PACU Vitals:  Vitals Value Taken Time   /95 2019 11:45 AM   Temp 36.7  C (98.1  F) 2019 11:45 AM   Pulse 78 2019 11:45 AM   Resp 20 2019 11:56 AM   SpO2 95 % 2019 11:56 AM   Temp src     NIBP     Pulse     SpO2     Resp     Temp     Ht Rate     Temp 2     Vitals shown include unvalidated device data.      Electronically Signed By: Jessee Sterling DO, 2019, 11:57 AM

## 2019-09-23 NOTE — BRIEF OP NOTE
[facility], [place]    Brief Operative Note    Pre-operative diagnosis: Deviated Septum, Hypertrophy Turbinates, Chronic Sinusitis  Post-operative diagnosis chronic sinusitis   Procedure: Procedure(s):  Septoplasty, Bilateral Inferior Turbinate Reduction  Bilateral Maxillary Antrostomy  Bilateral Anterior Ethmoidectomy  Surgeon: Surgeon(s) and Role:     * Stephan Crowley MD - Primary     * Pao Fernandez MD - Fellow - Assisting  Anesthesia: General   Estimated blood loss: None  Drains: None  Specimens: * No specimens in log *  Findings:   1 severe deviation of the nasal septum to the left side to bilateral disease maxillary sinus 3 bilateral diseased anterior ethmoid air cells 4  bilateral inferior turbinate hypertrophy.  Complications: None.  Implants:  * No implants in log *

## 2019-09-23 NOTE — DISCHARGE INSTRUCTIONS
bed elevation 30 degrees   Keep the nasal pack and sides the patient knows and she will remove by herself tomorrow around noontime  Patient will start Kvng med saline irrigation tomorrow afternoon.       Septoplasty/Sinus Surgery Instructions    1.) Irrigate nose with saline nasal spray 4-5 sprays, 10-12 times per day to avoid crusting in the nose. You may also place Vaseline in the nose gently with a Q-tip to keep the nose moist. Start the day of surgery.    2.) Sleep with head and shoulders elevated 30 degrees for the first 3 days. This should help limit swelling.    3.) You may use a light ice pack over nose and cheeks for 10-15 minutes every 1 to 2 hours for the first 2-3 days after surgery. This will help control swelling and discomfort.     4.) Avoid and strenuous activity or extended periods of bending or stooping to help limit bleeding.     5.) No Aspirin, Ibuprofen (Motrin, Advil), Goody powder, Aleve or Naprosyn for 1 week after surgery. These medications may increase the chance of bleeding and bruising.    6.) Expect swelling and bruising of the cheeks, the eyelids and above the nose. Also, expect drainage from the nose that may be mildly bloody. You may use a small gauze moustache dressing taped to the upper lip to catch any dripping. If you have to change the dressing every 5 minutes, this may be a nose bleed that requires the doctor s attention. Call the On-call doctor at [phone] if this becomes a problem. Everyone is different and experiences different degrees of drainage, swelling and bruising.     7.) Take your pain medicine if needed and be sure to complete your antibiotic prescription unless you are having problems with the medication (for example: stomach upset, itching or hives).     8.) You may shower or bathe the day after surgery.     IF YOU HAVE QUESTIONS OR CONCERNS, PLEASE CALL OUR OFFICE  AT [phone] (24 hours/day, 7 days/week)  If you use hormonal birth control (such as the  pill, patch, ring or implants):  You will need a second form of birth control for 7 days (condoms, a diaphragm or contraceptive foam).  While in the surgery center, you received a medicine called Sugammadex.  Hormonal birth control (such as the pill, patch, ring or implants) may not work as well for a week after taking this medicine.      Scopolamine Patch  (Absorbed through the skin)    The Scopolamine Patch prevents nausea and vomiting caused by motion sickness or anesthesia and surgery in adults.    Brand Name(s): Transderm Scop, Transderm-Scope  There may be other brand names for this medicine.    When This Medicine Should Not Be Used:  You should not use this medicine if you have had an allergic reaction to scopolamine, or if you have narrow angle glaucoma.    How to Use This Medicine:    Your doctor will tell you how many patches to use, where to apply them, and how often to apply them.     Do not use more patches or apply them more often than your doctor tells you to.    This medicine comes with patient instructions. Read and follow these instructions carefully. Ask your doctor or pharmacist if you have any questions.    To prevent motion sickness, apply the patch at least 4 hours before you need it.    Wash and dry your hands thoroughly before applying the patch.    Leave the patch in its sealed wrapper until you are ready to put it on. Tear the wrapper open carefully. NEVER CUT the wrapper or the patch with scissors. Do not use any patch that has been cut by accident.    Take the liner off the sticky side before applying.    Apply the patch to dry, hairless skin behind the ear.    If the patch is loose or falls off, apply a new patch at a different place behind the ear.    After you take off the patch, wash the place where the patch was and your hands thoroughly.    Only one patch should be used at any time.    If a dose is missed:  If you forget to wear or change a patch, put one on as soon as you can. If  it is almost time to put on your next patch, wait until then to apply a new patch and skip the one you missed. Do not apply extra patches to make up for a missed dose.    How to Store and Dispose of This Medicine:    Store the patches at room temperature in a closed container, away from heat, moisture and direct light.    Fold the used patch in half with the sticky sides together. Throw any used patch away so that children or pets cannot get to it. You will also need to throw away old patches after the expiration date has passed.    Keep all medicine away from children and never share your medicine with anyone.    Ask your doctor or pharmacist before using any other medicine, including over-the-counter medicines, vitamins, and herbal products.  Tell your doctor if you are using any medicines that make you sleepy. These include sleeping pills, cold and allergy medicine, narcotic pain relievers and sedatives.     Tell your doctor if you are using any medicine that make you sleepy. These include sleeping pills, hanna and allergy medicine, narcotic pain relievers and sedatives.    Do not drink alcohol while you are using this medicine.     Warnings While Using This Medicine:    Make sure your doctor knows if you are pregnant or breastfeeding or if you have glaucoma, prostate problems, trouble urinating, blocked bowels, liver disease, kidney disease or a history of seizures or mental illness.    This medicine can cause blurring of vision and other vision problems if it comes in contact with the eyes. This medicine may also cause problems with urination. If any of these reactions occur, remove the patch and call your doctor right away.    This medicine may make you dizzy or drowsy. Avoid driving, using machines, or doing anything else that could be dangerous if you are not alert. If you plan to participate in underwater sports, this medicine may cause disorienting effects. If this is a concern for you, talk with your  doctor.    This medicine may make you sweat less and cause your body to get too hot. Be careful in hot weather, when you are exercising or if using sauna or whirlpool.    Make sure any doctor or dentist who treats you knows that you are using this medicine. This medicine may affect the results of certain medical tests.    Skin burns have been reported at the patch site in several patients wearing an aluminized transdermal system during a magnetic resonance imaging scan (MRI). Because Transderm Scop contains aluminum, it is recommended to remove the system before undergoing an MRI.    Call your doctor right away if you notice any of these side effects:    Allergic reaction: Itching or hives, swelling in your face or hands, swelling or tingling in your mouth or throat, chest tightness, trouble breathing    Blurred vision    Confusion or memory loss    Fast, slow or uneven heartbeat    Lightheadedness, dizziness, drowsiness or fainting    Seeing, hearing or feeling things that are not there    Severe eye pain    Trouble urinating    If you notice these less serious side effects, talk with your doctor:    Dry mouth    Dry, itchy or red eyes    Restlessness    Skin rash or redness    If you notice other side effects that you think are caused by this medicine, tell your doctor immediately.    Rev. 4/2014    If you use hormonal birth control (such as the pill, patch, ring or implants):  You will need a second form of birth control for 7 days (condoms, a diaphragm or contraceptive foam).  While in the surgery center, you received a medicine called Sugammadex.  Hormonal birth control (such as the pill, patch, ring or implants) may not work as well for a week after taking this medicine.  Same-Day Surgery   Adult Discharge Orders & Instructions     For 24 hours after surgery:  1. Get plenty of rest.  A responsible adult must stay with you for at least 24 hours after you leave the hospital.   2. Pain medication can slow your  reflexes. Do not drive or use heavy equipment.  If you have weakness or tingling, don't drive or use heavy equipment until this feeling goes away.  3. Mixing alcohol and pain medication can cause dizziness and slow your breathing. It can even be fatal. Do not drink alcohol while taking pain medication.  4. Avoid strenuous or risky activities.  Ask for help when climbing stairs.   5. You may feel lightheaded.  If so, sit for a few minutes before standing.  Have someone help you get up.   6. If you have nausea (feel sick to your stomach), drink only clear liquids such as apple juice, ginger ale, broth or 7-Up.  Rest may also help.  Be sure to drink enough fluids.  Move to a regular diet as you feel able. Take pain medications with a small amount of solid food, such as toast or crackers, to avoid nausea.   7. A slight fever is normal. Call the doctor if your fever is over 100 F (37.7 C) (taken under the tongue) or lasts longer than 24 hours.  8. You may have a dry mouth, muscle aches, trouble sleeping or a sore throat.  These symptoms should go away after 24 hours.  9. Do not make important or legal decisions.   Pain Management:      1. Take pain medication (if prescribed) for pain as directed by your physician.        2. WARNING: If the pain medication you have been prescribed contains Tylenol  (acetaminophen), DO NOT take additional doses of Tylenol (acetaminophen).     Call your doctor for any of the followin.  Signs of infection (fever, growing tenderness at the surgery site, severe pain, a large amount of drainage or bleeding, foul-smelling drainage, redness, swelling).    2.  It has been over 8 to 10 hours since surgery and you are still not able to urinate (pee).    3.  Headache for over 24 hours.    4.  Numbness, tingling or weakness the day after surgery (if you had spinal anesthesia).  To contact a doctor, call _____________________________________ or:      [phone] and ask for the Resident On Call  for:          __________________________________________ (answered 24 hours a day)      Emergency Department:  [place] [facility]: [phone]  [place] [facility]: [phone]               Rev. 10/2014

## 2019-09-23 NOTE — OR NURSING
MDA notified of elevated BP's 160's/ and pain level; otherwise VSS.  Received orders to administer labetalol and dilaudid.  Will continue to monitor.

## 2019-09-23 NOTE — ANESTHESIA CARE TRANSFER NOTE
Patient: Elisha Nunez    Procedure(s):  Septoplasty, Bilateral Inferior Turbinate Reduction  Bilateral Maxillary Antrostomy  Bilateral Anterior Ethmoidectomy    Diagnosis: Deviated Septum, Hypertrophy Turbinates, Chronic Sinusitis  Diagnosis Additional Information: No value filed.    Anesthesia Type:   General     Note:  Airway: Face tent.  Patient transferred to:PACU  Comments: Patient is drowsy resting comfortably on cart, denies pain. VSS, normothermic. IV is patent. Report to RN.Handoff Report: Identifed the Patient, Identified the Reponsible Provider, Reviewed the pertinent medical history, Discussed the surgical course, Reviewed Intra-OP anesthesia mangement and issues during anesthesia, Set expectations for post-procedure period and Allowed opportunity for questions and acknowledgement of understanding      Vitals: (Last set prior to Anesthesia Care Transfer)    CRNA VITALS  9/23/2019 0919 - 9/23/2019 0957      9/23/2019             NIBP:  133/76    Pulse:  82    Temp:  36.9  C (98.4  F)    SpO2:  100 %    Resp Rate (observed):  16                Electronically Signed By: MARKUS Casanova CRNA  September 23, 2019  9:57 AM

## 2019-09-24 NOTE — OP NOTE
Procedure Date: 09/23/2019     SURGEON:  Stephan Crowley MD    ASSISTANT SURGEON:  Pao Fernandez MD     PREOPERATIVE DIAGNOSES:   1.  Deviated nasal septum.   2.  Chronic maxillary sinusitis.   3.  Chronic ethmoidal sinusitis.   4.  Bilateral inferior turbinate hypertrophy.      POSTOPERATIVE DIAGNOSIS:     1.  Deviated nasal septum.   2.  Chronic maxillary sinusitis.   3.  Chronic ethmoidal sinusitis.   4.  Bilateral inferior turbinate hypertrophy.      PROCEDURES PERFORMED:   1.  Endoscopic septoplasty.   2.  Bilateral maxillary antrostomy.   3.  Bilateral anterior ethmoidectomy.   4.  Bilateral inferior turbinate reduction.      FINDINGS:   1.  Severe left-sided deviation of the nasal septum.   2.  Large uncinate process blocking the maxillary sinus ostium.   3.  Narrow ostiomeatal complex.   4.  Disease and polypoid ethmoid air cells.   4.  Mucosal thickening of the maxillary sinuses bilaterally.      HISTORY OF PRESENT ILLNESS:  The patient presented to ENT office with a complaint of nasal obstruction, chronic recurrent sinusitis treated multiple times with oral antibiotic, nasal steroid spray with no relief.  The decision was to undergo surgical correction for this.  Risks, benefits and alternatives of the procedure were explained to the patient and she agreed to proceed.      DESCRIPTION OF PROCEDURE:  After informed consent by the Surgeon and Anesthesia, the patient was evaluated in the preoperative area.  She was given topical sprays of Afrin, then taken to the operating room.  In the operating room, she was sterilely prepped and draped.  On examination of the patient's nose with a 0-degree scope, lidocaine 1% with epinephrine was injected into the right nasal cavity.  Hemitransfixion incision was done on the right side.  Then, suction Bellwood was used to raise mucoperiosteal flap back to the bony cartilaginous junction, cut through the cartilage and similar flap was raised on the opposite side.  Klaus knife was used to remove the deviated cartilaginous septum anteriorly back to the bony part.  The spur in the floor of the nose was also removed using pituitary forceps.  Multiple attempts were made using pituitary forceps to remove the whole deviated cartilaginous septum.  The flap was then laid back to its anatomic position with visualization of the skull base on both sides.  Pledgets soaked with cocaine 4% were placed in the ethmoid cavity bilaterally.  Then, lidocaine 1% with epinephrine on a spinal needle was injected over the axilla and body of middle turbinate bilaterally starting with the left side.  Pediatric backbiter was used to perform uncinectomy.  Then shaver was used to remove the remaining portion of the uncinate process superiorly up to the level of agger nasi and inferior to the level of the infundibulum. A seeker was then used to cannulate the maxillary ostium.  Then pediatric backbiter was used to widen the maxillary ostium.  Anterior ethmoid air cells were removed from medial to lateral direction using a microdebrider.  Attention was then focused on the right side.  In a similar fashion, a Eustace was used to medialize the middle turbinate and Pediatric backbiter was used to perform uncinectomy.  Shaver was used to remove the remaining portion of the uncinate process superiorly up to the level of agger nasi and inferior to the level of the infundibulum.  A seeker was then used to cannulate the maxillary ostium.  Then the Pediatric backbiter was used to widen the maxillary ostium.  Anterior ethmoid air cells were then removed from medial to lateral direction using a microdebrider.  Attention was then focused over the inferior turbinate.  Lidocaine 1% with epinephrine was injected over the anterior edge of the inferior turbinate bilaterally.  A Eustace was used to create a plane between the bone and the mucosa of the middle turbinate bilaterally.  Then, the inferior turbinate blade was  switched on the shaver.  Then, a microdebrider was inserted into the tunnel that was created over the inferior turbinate.  A turbinectomy was done using the microdebrider by creating a tunnel between the bone and the mucosa that was done bilaterally.  Then, Boies elevator was used to outfracture the inferior turbinate on both sides.  Nose and nasopharynx were thoroughly suctioned.  NasoPore sponge was placed in the ethmoid area bilaterally.  Nose and nasopharynx were thoroughly suctioned again.  Pepe pack was placed in the nasal cavity to be removed tomorrow morning.  The patient was then returned to Anesthesia.  The patient was awakened with no complication.        Dr. Crowley was present and available during the key portions of the surgery.         ESTEPHANIA CROWLEY MD       As dictated by LUIS WANG MD            D: 2019   T: 2019   MT:       Name:     INGRIS GUTIERREZ   MRN:      [id number]        Account:        [id number]   :      1980           Procedure Date: 2019      Document: [id number]

## 2020-01-14 ENCOUNTER — HOSPITAL ENCOUNTER (OUTPATIENT)
Dept: GENERAL RADIOLOGY | Facility: CLINIC | Age: 40
Discharge: HOME | End: 2020-01-14
Attending: OTOLARYNGOLOGY | Admitting: OTOLARYNGOLOGY
Payer: COMMERCIAL

## 2020-01-14 DIAGNOSIS — H92.10 OTORRHEA: ICD-10-CM

## 2020-01-14 PROCEDURE — 70120 X-RAY EXAM OF MASTOIDS: CPT

## 2020-03-02 ENCOUNTER — HOSPITAL ENCOUNTER (OUTPATIENT)
Dept: CT IMAGING | Facility: CLINIC | Age: 40
Discharge: HOME | End: 2020-03-02
Attending: OTOLARYNGOLOGY | Admitting: OTOLARYNGOLOGY
Payer: COMMERCIAL

## 2020-03-02 DIAGNOSIS — H70.13 CHRONIC MASTOIDITIS, BILATERAL: ICD-10-CM

## 2020-03-02 PROCEDURE — 70480 CT ORBIT/EAR/FOSSA W/O DYE: CPT

## 2020-06-23 ENCOUNTER — VIRTUAL VISIT (OUTPATIENT)
Dept: OTOLARYNGOLOGY | Facility: CLINIC | Age: 40
End: 2020-06-23
Payer: COMMERCIAL

## 2020-06-23 DIAGNOSIS — H66.23 CHRONIC ATTICOANTRAL SUPPURATIVE OTITIS MEDIA OF BOTH EARS: Primary | ICD-10-CM

## 2020-06-23 PROCEDURE — 99202 OFFICE O/P NEW SF 15 MIN: CPT | Mod: 95 | Performed by: OTOLARYNGOLOGY

## 2020-06-23 ASSESSMENT — ENCOUNTER SYMPTOMS
DIZZINESS: 1
TREMORS: 0
DOUBLE VISION: 0
HEMOPTYSIS: 0
HEADACHES: 0
TINGLING: 0
CONSTITUTIONAL NEGATIVE: 1
PHOTOPHOBIA: 0
COUGH: 0
BLURRED VISION: 0

## 2020-06-23 NOTE — LETTER
"    6/23/2020         RE: Elisha Nunez  [address] Location  [facility] [address]        Dear Colleague,    Thank you for referring your patient, Elisha Nunez, to the [facility]. Please see a copy of my visit note below.    Elisha Nunez is a 39 year old female who is being evaluated via a billable telephone visit.      The patient has been notified of following:     \"This telephone visit will be conducted via a call between you and your physician/provider. We have found that certain health care needs can be provided without the need for a physical exam.  This service lets us provide the care you need with a short phone conversation.  If a prescription is necessary we can send it directly to your pharmacy.  If lab work is needed we can place an order for that and you can then stop by our lab to have the test done at a later time.    Telephone visits are billed at different rates depending on your insurance coverage. During this emergency period, for some insurers they may be billed the same as an in-person visit.  Please reach out to your insurance provider with any questions.    If during the course of the call the physician/provider feels a telephone visit is not appropriate, you will not be charged for this service.\"    Patient has given verbal consent for Telephone visit?  Yes    What phone number would you like to be contacted at? [phone]    How would you like to obtain your AVS? Patient declined.    Phone call duration: 20 minutes    Jordon Hou MD        HPI    This pleasant patient is seen on a telephone visit. She is a patient of Dr. Crowley. In her last visit with him, granulation tissues were cleaned. She still continues to have symptoms in her right ear. She decribes  right ear pain that comes and goes. Dull pain in her right ear. Crackling sound and popping sensation in her right ear.  States ear drainage a couple of weeks ago. She has a hx of septoplasty, " turbinoplasty, right and left ear surgeries.     Review of Systems   Constitutional: Negative.    HENT: Positive for ear discharge, ear pain, hearing loss and tinnitus.    Eyes: Negative for blurred vision, double vision and photophobia.   Respiratory: Negative for cough and hemoptysis.    Skin: Negative.    Neurological: Positive for dizziness. Negative for tingling, tremors and headaches.   Endo/Heme/Allergies: Positive for environmental allergies.         Physical Exam    No Physical was performed.    A/P  CT from March of 2020 was reviewed. There is some soft tissue in the right middle ear, especially in the attic. We will see her here in the clinic to check her ears under the microscope to r/o any irreversible pathology including granulation tissue in her right ear. She agreed on the plan and will be seen in the clinic. Her questions were answered.      Again, thank you for allowing me to participate in the care of your patient.        Sincerely,        Jordon Hou MD

## 2020-06-23 NOTE — PROGRESS NOTES
HPI    This pleasant patient is seen on a telephone visit. She is a patient of Dr. Crowley. In her last visit with him, granulation tissues were cleaned. She still continues to have symptoms in her right ear. She decribes  right ear pain that comes and goes. Dull pain in her right ear. Crackling sound and popping sensation in her right ear.  States ear drainage a couple of weeks ago. She has a hx of septoplasty, turbinoplasty, right and left ear surgeries.     Review of Systems   Constitutional: Negative.    HENT: Positive for ear discharge, ear pain, hearing loss and tinnitus.    Eyes: Negative for blurred vision, double vision and photophobia.   Respiratory: Negative for cough and hemoptysis.    Skin: Negative.    Neurological: Positive for dizziness. Negative for tingling, tremors and headaches.   Endo/Heme/Allergies: Positive for environmental allergies.         Physical Exam    No Physical was performed.    A/P  CT from March of 2020 was reviewed. There is some soft tissue in the right middle ear, especially in the attic. We will see her here in the clinic to check her ears under the microscope to r/o any irreversible pathology including granulation tissue in her right ear. She agreed on the plan and will be seen in the clinic. Her questions were answered.

## 2020-06-23 NOTE — PROGRESS NOTES
"Elisha Nunez is a 39 year old female who is being evaluated via a billable telephone visit.      The patient has been notified of following:     \"This telephone visit will be conducted via a call between you and your physician/provider. We have found that certain health care needs can be provided without the need for a physical exam.  This service lets us provide the care you need with a short phone conversation.  If a prescription is necessary we can send it directly to your pharmacy.  If lab work is needed we can place an order for that and you can then stop by our lab to have the test done at a later time.    Telephone visits are billed at different rates depending on your insurance coverage. During this emergency period, for some insurers they may be billed the same as an in-person visit.  Please reach out to your insurance provider with any questions.    If during the course of the call the physician/provider feels a telephone visit is not appropriate, you will not be charged for this service.\"    Patient has given verbal consent for Telephone visit?  Yes    What phone number would you like to be contacted at? [phone]    How would you like to obtain your AVS? Patient declined.    Phone call duration: 20 minutes    Jordon Hou MD      "

## 2020-07-31 ENCOUNTER — OFFICE VISIT (OUTPATIENT)
Dept: OTOLARYNGOLOGY | Facility: CLINIC | Age: 40
End: 2020-07-31
Payer: COMMERCIAL

## 2020-07-31 DIAGNOSIS — H66.23 CHRONIC ATTICOANTRAL SUPPURATIVE OTITIS MEDIA OF BOTH EARS: Primary | ICD-10-CM

## 2020-07-31 PROCEDURE — 99213 OFFICE O/P EST LOW 20 MIN: CPT | Performed by: OTOLARYNGOLOGY

## 2020-07-31 ASSESSMENT — PAIN SCALES - GENERAL: PAINLEVEL: MILD PAIN (3)

## 2020-07-31 NOTE — PROGRESS NOTES
HPI  She is a patient of Dr. Crowley.  In her last visit with him, granulation tissues were cleaned. She still continues to have symptoms in her right ear. She describes right ear pain that comes and goes. Dull pain in her right ear. Crackling sound and popping sensation in her right ear.  States ear drainage a couple of weeks ago. She has a hx of bilateral mastoidectomy, tympanoplasty, ossiculoplasty and canaloplasties in 2015 and 2019, septoplasty, turbinoplasty, FESS.     CT in 2020 March:   Right Temporal Bone:  Postoperative changes from canal plasty are  noted, with a new small defect in the roof of the external auditory  canal. However there is no evidence of soft tissue protrusion to  suggest an encephalocele.     Tympanic membrane is thickened. Ossicles appear intact except for soft  tissue surrounding the long processes of the malleus and incus and  around the stapes superstructure. Generally there is better aeration  of the middle ear cavity. There is thinning of the tegmen tympani as  on the previous exam. It is impossible to rule out a small  encephalocele. Inner ear structures appear normal except for thinning  over the superior semicircular canal similar to on the previous exam.  Mastoid air cells are almost completely opacified with only minimal  aeration of the aditus ad antrum.     Left Temporal Bone:  The postoperative appearance is similar to on the  previous exam, with chronic thickening of the tympanic membrane  inferiorly especially around the tympanostomy tube. Normal ossicles.  There is thinning of the tegmen tympani but this is unchanged. Inner  ear structures appear normal except for dehiscence of the superior  semicircular canal, unchanged. Mastoid air cells are well aerated.    ROS      Physical Exam    CT from March of 2020 was reviewed. Bilateral ear canals were cleaned. Right PE tube is functioning well. No infection.   Left ear: ear wax was removed. No infection. Ear drum is  retracted. PE tube is clogged.    A/P  She will be given ear wax softener and be seen in the clinic. If we cannot open up the lumen of the PE tube in her left ear. We will remove and replace it. She agreed on the plan and will be seen in the clinic. Her questions were answered.

## 2020-07-31 NOTE — PATIENT INSTRUCTIONS
a bottle of Mineral Oil at any pharmacy, generic brand is fine.     Ask for droperette at at the Pharmacy too    Use twice a day - let soak in left ear for a little while after each use.

## 2020-07-31 NOTE — NURSING NOTE
Elisha Nunez's goals for this visit include:   Chief Complaint   Patient presents with     Follow Up     Check ears, PE tubes - f/u from virtual visit     She requests these members of her care team be copied on today's visit information: Yes    PCP: Julia Beltran    Referring Provider:  No referring provider defined for this encounter.    There were no vitals taken for this visit.    Do you need any medication refills at today's visit? No    Nilsa Richardson LPN

## 2020-07-31 NOTE — LETTER
7/31/2020         RE: Elisha Nunez  [address] Location  [facility] [address]        Dear Colleague,    Thank you for referring your patient, Elisha Nunez, to the [facility]. Please see a copy of my visit note below.    HPI  She is a patient of Dr. Crowley.  In her last visit with him, granulation tissues were cleaned. She still continues to have symptoms in her right ear. She describes right ear pain that comes and goes. Dull pain in her right ear. Crackling sound and popping sensation in her right ear.  States ear drainage a couple of weeks ago. She has a hx of bilateral mastoidectomy, tympanoplasty, ossiculoplasty and canaloplasties in 2015 and 2019, septoplasty, turbinoplasty, FESS.     CT in 2020 March:   Right Temporal Bone:  Postoperative changes from canal plasty are  noted, with a new small defect in the roof of the external auditory  canal. However there is no evidence of soft tissue protrusion to  suggest an encephalocele.     Tympanic membrane is thickened. Ossicles appear intact except for soft  tissue surrounding the long processes of the malleus and incus and  around the stapes superstructure. Generally there is better aeration  of the middle ear cavity. There is thinning of the tegmen tympani as  on the previous exam. It is impossible to rule out a small  encephalocele. Inner ear structures appear normal except for thinning  over the superior semicircular canal similar to on the previous exam.  Mastoid air cells are almost completely opacified with only minimal  aeration of the aditus ad antrum.     Left Temporal Bone:  The postoperative appearance is similar to on the  previous exam, with chronic thickening of the tympanic membrane  inferiorly especially around the tympanostomy tube. Normal ossicles.  There is thinning of the tegmen tympani but this is unchanged. Inner  ear structures appear normal except for dehiscence of the superior  semicircular canal, unchanged.  Mastoid air cells are well aerated.    ROS      Physical Exam    CT from March of 2020 was reviewed. Bilateral ear canals were cleaned. Right PE tube is functioning well. No infection.   Left ear: ear wax was removed. No infection. Ear drum is retracted. PE tube is clogged.    A/P  She will be given ear wax softener and be seen in the clinic. If we cannot open up the lumen of the PE tube in her left ear. We will remove and replace it. She agreed on the plan and will be seen in the clinic. Her questions were answered.      Again, thank you for allowing me to participate in the care of your patient.        Sincerely,        Jordon Hou MD

## 2020-08-14 ENCOUNTER — TELEPHONE (OUTPATIENT)
Dept: OTOLARYNGOLOGY | Facility: CLINIC | Age: 40
End: 2020-08-14

## 2020-08-14 NOTE — TELEPHONE ENCOUNTER
Left voicemail for patient to discuss 9/1 appt. Initially was scheduled as just ENT so Dr. Gilbert could look at tube and possibly replace. Provided direct clinic line to discuss why hearing test was scheduled and that if she would like an audio, we can plan for that after so we can ensure a functional tube is in place prior to a hearing test.      Nilsa Richardson LPN

## 2020-09-01 ENCOUNTER — OFFICE VISIT (OUTPATIENT)
Dept: OTOLARYNGOLOGY | Facility: CLINIC | Age: 40
End: 2020-09-01
Payer: COMMERCIAL

## 2020-09-01 VITALS — OXYGEN SATURATION: 96 % | HEART RATE: 74 BPM | SYSTOLIC BLOOD PRESSURE: 134 MMHG | DIASTOLIC BLOOD PRESSURE: 78 MMHG

## 2020-09-01 DIAGNOSIS — H66.12 CHRONIC TUBOTYMPANIC SUPPURATIVE OTITIS MEDIA OF LEFT EAR: Primary | ICD-10-CM

## 2020-09-01 PROCEDURE — 99213 OFFICE O/P EST LOW 20 MIN: CPT | Mod: 25 | Performed by: OTOLARYNGOLOGY

## 2020-09-01 PROCEDURE — 69433 CREATE EARDRUM OPENING: CPT | Mod: LT | Performed by: OTOLARYNGOLOGY

## 2020-09-01 RX ORDER — CIPROFLOXACIN AND DEXAMETHASONE 3; 1 MG/ML; MG/ML
4 SUSPENSION/ DROPS AURICULAR (OTIC) 2 TIMES DAILY
Qty: 7.5 ML | Refills: 0 | Status: SHIPPED | OUTPATIENT
Start: 2020-09-01 | End: 2020-09-11

## 2020-09-01 ASSESSMENT — ENCOUNTER SYMPTOMS
HEARTBURN: 0
NAUSEA: 0
BLURRED VISION: 0
HEMOPTYSIS: 0
PHOTOPHOBIA: 0
CONSTITUTIONAL NEGATIVE: 1
VOMITING: 0
SINUS PAIN: 0
BRUISES/BLEEDS EASILY: 0
STRIDOR: 0
DOUBLE VISION: 0
COUGH: 0

## 2020-09-01 ASSESSMENT — PAIN SCALES - GENERAL: PAINLEVEL: MILD PAIN (2)

## 2020-09-01 NOTE — PROGRESS NOTES
HPI  She is here for the f/u. She has a hx of bilateral mastoidectomy, tympanoplasty, ossiculoplasty and canaloplasties in 2015 and 2019, septoplasty, turbinoplasty, FESS.     CT in 2020 March:   Right Temporal Bone:  Postoperative changes from canal plasty are  noted, with a new small defect in the roof of the external auditory  canal. However there is no evidence of soft tissue protrusion to  suggest an encephalocele.     Tympanic membrane is thickened. Ossicles appear intact except for soft  tissue surrounding the long processes of the malleus and incus and  around the stapes superstructure. Generally there is better aeration  of the middle ear cavity. There is thinning of the tegmen tympani as  on the previous exam. It is impossible to rule out a small  encephalocele. Inner ear structures appear normal except for thinning  over the superior semicircular canal similar to on the previous exam.  Mastoid air cells are almost completely opacified with only minimal  aeration of the aditus ad antrum.     Left Temporal Bone:  The postoperative appearance is similar to on the  previous exam, with chronic thickening of the tympanic membrane  inferiorly especially around the tympanostomy tube. Normal ossicles.  There is thinning of the tegmen tympani but this is unchanged. Inner  ear structures appear normal except for dehiscence of the superior  semicircular canal, unchanged. Mastoid air cells are well aerated.        Review of Systems   Constitutional: Negative.    HENT: Positive for ear discharge, ear pain, hearing loss and tinnitus. Negative for sinus pain.    Eyes: Negative for blurred vision, double vision and photophobia.   Respiratory: Negative for cough, hemoptysis and stridor.    Gastrointestinal: Negative for heartburn, nausea and vomiting.   Skin: Negative.    Endo/Heme/Allergies: Positive for environmental allergies. Does not bruise/bleed easily.         Physical Exam  Vitals signs reviewed.   Constitutional:        Appearance: Normal appearance. She is obese.   HENT:      Head: Normocephalic and atraumatic.      Right Ear: Ear canal and external ear normal. Decreased hearing noted. A PE tube is present.      Left Ear: Ear canal and external ear normal. Decreased hearing noted. A PE tube is present.      Nose: Nose normal. No congestion or rhinorrhea.   Neurological:      Mental Status: She is alert.         CT from March of 2020 was reviewed. Bilateral ear canals were cleaned. Right PE tube is functioning well. No infection.   Left ear: ear wax was removed. No infection. Ear drum is retracted. PE tube is clogged.    Surgery:     Myringotomy and PE tube insertion: Left ear  Diagnosis:     1. Otitis media with effusion, Bilateral            2. Eustachian Tube dysfunction    She was seen in the room and identified. Pros and cons of the procedure were explained to the patient. The procedure and its alternatives were explained to the patient in lay terms. Her questions were answered. Her symptoms required a myringotomy under local anesthesia. After obtaining an informed consent from the patient, topical phenol  was applied to left ear drum at the anterior lower quadrant. Then a myringotomy was performed with a sickle knife. There was serous fluid inside the middle ear that was suctioned by using number 7 and 5 suction tips. A 1.14 mm PE tube was inserted. She tolerated the procedure well and left the room with no complications. She will be seen in the f/u.         A/P  She will be given ear wax softener and be seen in the clinic. We removed and replaced the PE tube in her left ear. She agreed on the plan and will be seen in the clinic in the f/u in 2 weeks. Her questions were answered.

## 2020-09-01 NOTE — NURSING NOTE
Elisha Nunez's goals for this visit include:   Chief Complaint   Patient presents with     Follow Up     Check wax/tube in left ear- possibly replace     She requests these members of her care team be copied on today's visit information: Yes    PCP: Julia Beltran    Referring Provider:  No referring provider defined for this encounter.    /78 (BP Location: Right arm, Patient Position: Sitting, Cuff Size: Adult Large)   Pulse 74   SpO2 96%     Do you need any medication refills at today's visit? No    Nilsa Richardson LPN

## 2020-09-01 NOTE — LETTER
9/1/2020         RE: Elisha Nunez  [address]  [place] [address]        Dear Colleague,    Thank you for referring your patient, Elisha Nunez, to the [facility]. Please see a copy of my visit note below.    HPI  She is here for the f/u. She has a hx of bilateral mastoidectomy, tympanoplasty, ossiculoplasty and canaloplasties in 2015 and 2019, septoplasty, turbinoplasty, FESS.     CT in 2020 March:   Right Temporal Bone:  Postoperative changes from canal plasty are  noted, with a new small defect in the roof of the external auditory  canal. However there is no evidence of soft tissue protrusion to  suggest an encephalocele.     Tympanic membrane is thickened. Ossicles appear intact except for soft  tissue surrounding the long processes of the malleus and incus and  around the stapes superstructure. Generally there is better aeration  of the middle ear cavity. There is thinning of the tegmen tympani as  on the previous exam. It is impossible to rule out a small  encephalocele. Inner ear structures appear normal except for thinning  over the superior semicircular canal similar to on the previous exam.  Mastoid air cells are almost completely opacified with only minimal  aeration of the aditus ad antrum.     Left Temporal Bone:  The postoperative appearance is similar to on the  previous exam, with chronic thickening of the tympanic membrane  inferiorly especially around the tympanostomy tube. Normal ossicles.  There is thinning of the tegmen tympani but this is unchanged. Inner  ear structures appear normal except for dehiscence of the superior  semicircular canal, unchanged. Mastoid air cells are well aerated.        Review of Systems   Constitutional: Negative.    HENT: Positive for ear discharge, ear pain, hearing loss and tinnitus. Negative for sinus pain.    Eyes: Negative for blurred vision, double vision and photophobia.   Respiratory: Negative for cough, hemoptysis and  stridor.    Gastrointestinal: Negative for heartburn, nausea and vomiting.   Skin: Negative.    Endo/Heme/Allergies: Positive for environmental allergies. Does not bruise/bleed easily.         Physical Exam  Vitals signs reviewed.   Constitutional:       Appearance: Normal appearance. She is obese.   HENT:      Head: Normocephalic and atraumatic.      Right Ear: Ear canal and external ear normal. Decreased hearing noted. A PE tube is present.      Left Ear: Ear canal and external ear normal. Decreased hearing noted. A PE tube is present.      Nose: Nose normal. No congestion or rhinorrhea.   Neurological:      Mental Status: She is alert.         CT from March of 2020 was reviewed. Bilateral ear canals were cleaned. Right PE tube is functioning well. No infection.   Left ear: ear wax was removed. No infection. Ear drum is retracted. PE tube is clogged.    Surgery:     Myringotomy and PE tube insertion: Left ear  Diagnosis:     1. Otitis media with effusion, Bilateral            2. Eustachian Tube dysfunction    She was seen in the room and identified. Pros and cons of the procedure were explained to the patient. The procedure and its alternatives were explained to the patient in lay terms. Her questions were answered. Her symptoms required a myringotomy under local anesthesia. After obtaining an informed consent from the patient, topical phenol  was applied to left ear drum at the anterior lower quadrant. Then a myringotomy was performed with a sickle knife. There was serous fluid inside the middle ear that was suctioned by using number 7 and 5 suction tips. A 1.14 mm PE tube was inserted. She tolerated the procedure well and left the room with no complications. She will be seen in the f/u.         A/P  She will be given ear wax softener and be seen in the clinic. We removed and replaced the PE tube in her left ear. She agreed on the plan and will be seen in the clinic in the f/u in 2 weeks. Her questions were  answered.    Again, thank you for allowing me to participate in the care of your patient.        Sincerely,        Jordon Hou MD

## 2020-10-09 ENCOUNTER — OFFICE VISIT (OUTPATIENT)
Dept: OTOLARYNGOLOGY | Facility: CLINIC | Age: 40
End: 2020-10-09
Payer: COMMERCIAL

## 2020-10-09 VITALS — OXYGEN SATURATION: 96 % | SYSTOLIC BLOOD PRESSURE: 127 MMHG | HEART RATE: 72 BPM | DIASTOLIC BLOOD PRESSURE: 84 MMHG

## 2020-10-09 DIAGNOSIS — G47.33 OSA (OBSTRUCTIVE SLEEP APNEA): Primary | ICD-10-CM

## 2020-10-09 PROCEDURE — 99213 OFFICE O/P EST LOW 20 MIN: CPT | Mod: 25 | Performed by: OTOLARYNGOLOGY

## 2020-10-09 PROCEDURE — 92504 EAR MICROSCOPY EXAMINATION: CPT | Performed by: OTOLARYNGOLOGY

## 2020-10-09 NOTE — NURSING NOTE
Elisha Nunez's goals for this visit include:   Chief Complaint   Patient presents with     Ear Problem     She requests these members of her care team be copied on today's visit information:     PCP: Julia Beltran    Referring Provider:  No referring provider defined for this encounter.    /84 (BP Location: Right arm, Patient Position: Sitting, Cuff Size: Adult Large)   Pulse 72   SpO2 96%     Do you need any medication refills at today's visit? No  Albertina Anne CMA

## 2020-10-09 NOTE — PROGRESS NOTES
HPI  She is here for the f/u.  I am glad to hear that she has no ear drainage, pain, pressure. PE tubes are functioning well. She has a hx of bilateral mastoidectomy, tympanoplasty, ossiculoplasty and canaloplasties x4 in 2015 and 2019 for her right ear and tympanoplasty, atticotomy for her left ear, septoplasty, turbinoplasty, FESS.     CT in 2020 March:   Right Temporal Bone:  Postoperative changes from canal plasty are  noted, with a new small defect in the roof of the external auditory  canal. However there is no evidence of soft tissue protrusion to  suggest an encephalocele.     Tympanic membrane is thickened. Ossicles appear intact except for soft  tissue surrounding the long processes of the malleus and incus and  around the stapes superstructure. Generally there is better aeration  of the middle ear cavity. There is thinning of the tegmen tympani as  on the previous exam. It is impossible to rule out a small  encephalocele. Inner ear structures appear normal except for thinning  over the superior semicircular canal similar to on the previous exam.  Mastoid air cells are almost completely opacified with only minimal  aeration of the aditus ad antrum.     Left Temporal Bone:  The postoperative appearance is similar to on the  previous exam, with chronic thickening of the tympanic membrane  inferiorly especially around the tympanostomy tube. Normal ossicles.  There is thinning of the tegmen tympani but this is unchanged. Inner  ear structures appear normal except for dehiscence of the superior  semicircular canal, unchanged. Mastoid air cells are well aerated.        Review of Systems   Constitutional: Negative.    HENT: Positive for ear discharge, ear pain, hearing loss and tinnitus. Negative for sinus pain.    Eyes: Negative for blurred vision, double vision and photophobia.   Respiratory: Negative for cough, hemoptysis and stridor.    Gastrointestinal: Negative for heartburn, nausea and vomiting.   Skin:  Negative.    Endo/Heme/Allergies: Positive for environmental allergies. Does not bruise/bleed easily.         Physical Exam  Vitals signs reviewed.   Constitutional:       Appearance: Normal appearance. She is obese.   HENT:      Head: Normocephalic and atraumatic.      Right Ear: Ear canal and external ear normal. Decreased hearing noted. A PE tube is present.      Left Ear: Ear canal and external ear normal. Decreased hearing noted. A PE tube is present.      Nose: Nose normal. No congestion or rhinorrhea.   Neurological:      Mental Status: She is alert.         CT from March of 2020 was reviewed. Bilateral ear canals were cleaned. Right PE tube is functioning well. No infection. Ear wax was removed with suctioning under the microscope.  Left ear: ear wax was removed with suctioning under the microscope. No infection. Ear drum is retracted. PE tube is functioning well.           A/P  This pleasant patient has a diagnosis of bilateral chronic otitis media with attic scar tissues+ SSCC dehiscence bilaterally. Her questions were answered. Greater than 20 minutes were spent on this visit of which 50% were spent on counseling and coordinating of care. A sleep study was requested.

## 2020-10-09 NOTE — LETTER
10/9/2020         RE: Elisha Nunez  [address] Location  [facility] [address]        Dear Colleague,    Thank you for referring your patient, Elisha Nunez, to the [facility]. Please see a copy of my visit note below.    HPI  She is here for the f/u.  I am glad to hear that she has no ear drainage, pain, pressure. PE tubes are functioning well. She has a hx of bilateral mastoidectomy, tympanoplasty, ossiculoplasty and canaloplasties x4 in 2015 and 2019 for her right ear and tympanoplasty, atticotomy for her left ear, septoplasty, turbinoplasty, FESS.     CT in 2020 March:   Right Temporal Bone:  Postoperative changes from canal plasty are  noted, with a new small defect in the roof of the external auditory  canal. However there is no evidence of soft tissue protrusion to  suggest an encephalocele.     Tympanic membrane is thickened. Ossicles appear intact except for soft  tissue surrounding the long processes of the malleus and incus and  around the stapes superstructure. Generally there is better aeration  of the middle ear cavity. There is thinning of the tegmen tympani as  on the previous exam. It is impossible to rule out a small  encephalocele. Inner ear structures appear normal except for thinning  over the superior semicircular canal similar to on the previous exam.  Mastoid air cells are almost completely opacified with only minimal  aeration of the aditus ad antrum.     Left Temporal Bone:  The postoperative appearance is similar to on the  previous exam, with chronic thickening of the tympanic membrane  inferiorly especially around the tympanostomy tube. Normal ossicles.  There is thinning of the tegmen tympani but this is unchanged. Inner  ear structures appear normal except for dehiscence of the superior  semicircular canal, unchanged. Mastoid air cells are well aerated.        Review of Systems   Constitutional: Negative.    HENT: Positive for ear discharge, ear pain,  hearing loss and tinnitus. Negative for sinus pain.    Eyes: Negative for blurred vision, double vision and photophobia.   Respiratory: Negative for cough, hemoptysis and stridor.    Gastrointestinal: Negative for heartburn, nausea and vomiting.   Skin: Negative.    Endo/Heme/Allergies: Positive for environmental allergies. Does not bruise/bleed easily.         Physical Exam  Vitals signs reviewed.   Constitutional:       Appearance: Normal appearance. She is obese.   HENT:      Head: Normocephalic and atraumatic.      Right Ear: Ear canal and external ear normal. Decreased hearing noted. A PE tube is present.      Left Ear: Ear canal and external ear normal. Decreased hearing noted. A PE tube is present.      Nose: Nose normal. No congestion or rhinorrhea.   Neurological:      Mental Status: She is alert.         CT from March of 2020 was reviewed. Bilateral ear canals were cleaned. Right PE tube is functioning well. No infection. Ear wax was removed with suctioning under the microscope.  Left ear: ear wax was removed with suctioning under the microscope. No infection. Ear drum is retracted. PE tube is functioning well.           A/P  This pleasant patient has a diagnosis of bilateral chronic otitis media with attic scar tissues+ SSCC dehiscence bilaterally. Her questions were answered. Greater than 20 minutes were spent on this visit of which 50% were spent on counseling and coordinating of care. A sleep study was requested.      Again, thank you for allowing me to participate in the care of your patient.        Sincerely,        Jordon Hou MD

## 2020-12-20 ENCOUNTER — HEALTH MAINTENANCE LETTER (OUTPATIENT)
Age: 40
End: 2020-12-20

## 2021-06-14 ENCOUNTER — OFFICE VISIT (OUTPATIENT)
Dept: OTOLARYNGOLOGY | Facility: CLINIC | Age: 41
End: 2021-06-14
Payer: COMMERCIAL

## 2021-06-14 VITALS — DIASTOLIC BLOOD PRESSURE: 88 MMHG | HEART RATE: 89 BPM | SYSTOLIC BLOOD PRESSURE: 140 MMHG

## 2021-06-14 DIAGNOSIS — H92.11 OTORRHEA, RIGHT: Primary | ICD-10-CM

## 2021-06-14 PROCEDURE — 99213 OFFICE O/P EST LOW 20 MIN: CPT | Mod: 25 | Performed by: OTOLARYNGOLOGY

## 2021-06-14 PROCEDURE — 92504 EAR MICROSCOPY EXAMINATION: CPT | Performed by: OTOLARYNGOLOGY

## 2021-06-14 RX ORDER — CIPROFLOXACIN AND DEXAMETHASONE 3; 1 MG/ML; MG/ML
4 SUSPENSION/ DROPS AURICULAR (OTIC) 2 TIMES DAILY
Qty: 7.5 ML | Refills: 0 | Status: SHIPPED | OUTPATIENT
Start: 2021-06-14 | End: 2021-06-21

## 2021-06-14 ASSESSMENT — PAIN SCALES - GENERAL: PAINLEVEL: NO PAIN (0)

## 2021-06-14 NOTE — PROGRESS NOTES
CC: right ear pain and drainage    HPI: Patient has a long history of bilateral chronic ear disease.  She has had history of multiple ear surgeries.  She is previously been seen by Dr. Hou.  This is my first time seeing her.  Patient reports that she does have a history of chronic intermittent ear drainage.  Her right ear started draining a few days ago and she had some right-sided ear pain last night.  She typically does mastoid ear cleaning about every 3 to 4 months she reports.  Its been 6 months since her last clean though.    PE:  GEN: nad  EARS: Under the binocular microscope the ears were visualized.  Right ear was visualized first.  Patient has a moderate amount of cerumen present which I was able to remove with a suction.  Right tympanic membrane is intact and the PE tube is in place.  She does have a small amount of blood just on the edge of her PE tube.  Right tympanic membrane also looks slightly irritated though there is no sign of overt infection.  Left ear is then visualized.  Patient does have a moderate amount of cerumen present which I was able to remove with a suction.  Left tympanic membrane is intact with a PE tube present.  PT does appear to be starting to work its way out.    A/P:  Patient presents with right ear pain and drainage.  Part of the drainage could have been from cerumen.  She does have some irritation of the right tympanic membrane and I would recommend doing Ciprodex eardrops for 1 week.  We will have the patient come back in and see Dr. Hou in 3 months for routine ear cleaning and check.    I spent a total of 20 minutes total time towards today's visit.  Time spent included seeing and evaluating Elisha Nunez during today's office visit, which included counseling the patient.  Time was also spent reviewing records/tests; ordering ciprodex; and documenting clinical information in the electronic health record.

## 2021-06-14 NOTE — LETTER
6/14/2021         RE: Elisha BROWN Mayra  [address]  [facility] [address]        Dear Colleague,    Thank you for referring your patient, Elisha Nunez, to the [facility]. Please see a copy of my visit note below.    CC: right ear pain and drainage    HPI: Patient has a long history of bilateral chronic ear disease.  She has had history of multiple ear surgeries.  She is previously been seen by Dr. Hou.  This is my first time seeing her.  Patient reports that she does have a history of chronic intermittent ear drainage.  Her right ear started draining a few days ago and she had some right-sided ear pain last night.  She typically does mastoid ear cleaning about every 3 to 4 months she reports.  Its been 6 months since her last clean though.    PE:  GEN: nad  EARS: Under the binocular microscope the ears were visualized.  Right ear was visualized first.  Patient has a moderate amount of cerumen present which I was able to remove with a suction.  Right tympanic membrane is intact and the PE tube is in place.  She does have a small amount of blood just on the edge of her PE tube.  Right tympanic membrane also looks slightly irritated though there is no sign of overt infection.  Left ear is then visualized.  Patient does have a moderate amount of cerumen present which I was able to remove with a suction.  Left tympanic membrane is intact with a PE tube present.  PT does appear to be starting to work its way out.    A/P:  Patient presents with right ear pain and drainage.  Part of the drainage could have been from cerumen.  She does have some irritation of the right tympanic membrane and I would recommend doing Ciprodex eardrops for 1 week.  We will have the patient come back in and see Dr. Hou in 3 months for routine ear cleaning and check.    I spent a total of 20 minutes total time towards today's visit.  Time spent included seeing and evaluating Elisha Nunez during  today's office visit, which included counseling the patient.  Time was also spent reviewing records/tests; ordering ciprodex; and documenting clinical information in the electronic health record.        Again, thank you for allowing me to participate in the care of your patient.        Sincerely,        Marleen Medel MD

## 2021-06-14 NOTE — NURSING NOTE
Elisha Nunez's goals for this visit include:   Chief Complaint   Patient presents with     Ear Problem     right ear draining       She requests these members of her care team be copied on today's visit information:     PCP: Julia Beltran    Referring Provider:  No referring provider defined for this encounter.    There were no vitals taken for this visit.    Do you need any medication refills at today's visit? No    Rosa Isela lCaudio RN

## 2021-09-09 NOTE — ANESTHESIA PREPROCEDURE EVALUATION
Anesthesia Evaluation     . Pt has had prior anesthetic. Type: General    No history of anesthetic complications          ROS/MED HX    ENT/Pulmonary:     (+)other ENT- %Right Mastoiditis, , . .    Neurologic:  - neg neurologic ROS     Cardiovascular:  - neg cardiovascular ROS       METS/Exercise Tolerance:     Hematologic:  - neg hematologic  ROS       Musculoskeletal:  - neg musculoskeletal ROS       GI/Hepatic:     (+) GERD Asymptomatic on medication, Other GI/Hepatic Colitis     (-) inflamatory bowel disease   Renal/Genitourinary:  - ROS Renal section negative       Endo: Comment: BMI= 50    (+) Obesity, .      Psychiatric:     (+) psychiatric history anxiety and depression      Infectious Disease:  - neg infectious disease ROS       Malignancy:      - no malignancy   Other:    (+) C-spine cleared: N/A, H/O Chronic Pain,H/O chronic opiod use ,                    Physical Exam  Normal systems: cardiovascular, pulmonary and dental    Airway   Mallampati: I  TM distance: >3 FB  Neck ROM: full    Dental     Cardiovascular   Rhythm and rate: regular and normal      Pulmonary    breath sounds clear to auscultation                    Anesthesia Plan      History & Physical Review  History and physical reviewed and following examination; no interval change.    ASA Status:  3 .    NPO Status:  > 6 hours    Plan for General, RSI and ETT with Intravenous and Propofol induction. Maintenance will be Balanced.    PONV prophylaxis:  Ondansetron (or other 5HT-3) and Dexamethasone or Solumedrol  Additional equipment: Videolaryngoscope and 2nd IV Remifentanil gtt if procedure includes mastoidectomy      Postoperative Care  Postoperative pain management:  IV analgesics.      Consents  Anesthetic plan, risks, benefits and alternatives discussed with:  Patient.  Use of blood products discussed: No .   .                          .   [No Acute Distress] : no acute distress [Normal S1, S2] : normal S1, S2 [Clear Lung Fields] : clear lung fields [No Edema] : no edema [de-identified] : faint systolic murmur

## 2021-10-03 ENCOUNTER — HEALTH MAINTENANCE LETTER (OUTPATIENT)
Age: 41
End: 2021-10-03

## 2021-11-08 ENCOUNTER — TELEPHONE (OUTPATIENT)
Dept: OTOLARYNGOLOGY | Facility: CLINIC | Age: 41
End: 2021-11-08
Payer: COMMERCIAL

## 2021-11-08 DIAGNOSIS — J32.9 SINUSITIS: Primary | ICD-10-CM

## 2021-11-08 NOTE — TELEPHONE ENCOUNTER
Called and advised pt that Dr Gilbert is not in clinic today and we will contact her tomorrow after Vladimir looks at her message.   Renea VALLE

## 2021-11-08 NOTE — TELEPHONE ENCOUNTER
[facility] Call Center    Phone Message    May a detailed message be left on voicemail: yes     Reason for Call: Medication Question or concern regarding medication   Prescription Clarification  Name of Medication: Pt is wondering if Dr. Hou can prescribe her an antibiotic for her current sinus infection.  She said the drops are not helping.  She wants it sent to [facility] in [place].    She also needs a note faxed to her work to excuse her from work since she's been sick for more than 3 days.  She wants this faxed to Elisha Ayers at [phone].    Please call Pt to verify her requests can be complete.  Thanks.    Action Taken: Message routed to:  Adult Clinics: ENT p [id number]    Travel Screening: Not Applicable

## 2021-11-08 NOTE — LETTER
November 12, 2021      RE: Elisha Nunez         [address] [facility] [address]              To Whom it may concern,   Elisha Nunez is currently under my care for an illness. Please excuse her absence from work from 11/2-11/12/21 to recover.        Sincerely,      Jordon Hou MD

## 2021-11-10 ENCOUNTER — MYC MEDICAL ADVICE (OUTPATIENT)
Dept: OTOLARYNGOLOGY | Facility: CLINIC | Age: 41
End: 2021-11-10
Payer: COMMERCIAL

## 2021-11-10 NOTE — LETTER
November 22, 2021      RE: Elisha Nunez         [address] [facility] [address]              To Whom it may concern,   Elisha Nunez is currently under my care for an illness. Please excuse her absence from work from 11/2-11/17 to recover.        Sincerely,      Jordon Hou MD

## 2021-11-10 NOTE — TELEPHONE ENCOUNTER
Called patient and advised Vladimir will be in on Friday am, apologized for not getting back to her yesterday, as message was waiting a reply from Dr Gilbert. I will talk with Vladimir early Friday morning and get this addressed.   Patient is ok with the plan and if her symptoms get worse, she will go to an UC.   Renea VALLE

## 2021-11-12 NOTE — TELEPHONE ENCOUNTER
Elisha Moreno has a hx of ear and sinus surgeries. I did see her recent CT of temporal bones and sinuses. Apparently, she is having another episode of sinusitis. She can try one course of antibiotic, Amoxicillin+Clavulanate 875 BID for two weeks and be seen in the f/up if her problem continues.   Seb

## 2022-01-23 ENCOUNTER — HEALTH MAINTENANCE LETTER (OUTPATIENT)
Age: 42
End: 2022-01-23

## 2022-08-24 DIAGNOSIS — J32.0 CHRONIC MAXILLARY SINUSITIS: Primary | ICD-10-CM

## 2022-09-04 ENCOUNTER — HEALTH MAINTENANCE LETTER (OUTPATIENT)
Age: 42
End: 2022-09-04

## 2023-04-29 ENCOUNTER — HEALTH MAINTENANCE LETTER (OUTPATIENT)
Age: 43
End: 2023-04-29

## 2023-10-01 ENCOUNTER — HEALTH MAINTENANCE LETTER (OUTPATIENT)
Age: 43
End: 2023-10-01

## 2024-07-07 ENCOUNTER — HEALTH MAINTENANCE LETTER (OUTPATIENT)
Age: 44
End: 2024-07-07

## 2024-07-24 ASSESSMENT — ENCOUNTER SYMPTOMS
GASTROINTESTINAL NEGATIVE: 1
RESPIRATORY NEGATIVE: 1
CONSTITUTIONAL NEGATIVE: 1
EYES NEGATIVE: 1

## 2024-07-24 NOTE — PROGRESS NOTES
No chief complaint on file.     PCP: Julia Beltran     Referring Provider: No ref. provider found    There were no vitals taken for this visit.    ENT Problem List:  Patient Active Problem List   Diagnosis    Elevated liver enzymes    Chronic suppurative otitis media of right ear      Current Medications:  Current Outpatient Medications   Medication Sig Dispense Refill    amoxicillin-clavulanate (AUGMENTIN) 875-125 MG tablet Take 1 tablet by mouth 2 times daily 28 tablet 0    BUSPIRONE HCL PO Take 15 mg by mouth 2 times daily      cetirizine (ZYRTEC) 10 MG tablet Take 10 mg by mouth daily      CLONAZEPAM PO Take 0.5 mg by mouth 2 times daily      DiphenhydrAMINE HCl (BENADRYL PO) Take 50 mg by mouth nightly as needed      ergocalciferol (ERGOCALCIFEROL) 80378 units capsule Take 50,000 Units by mouth      etonogestrel-ethinyl estradiol (NUVARING) 0.12-0.015 MG/24HR vaginal ring Place 1 each vaginally every 28 days Place for 3 weeks and then remove for one week      Ferrous Sulfate (IRON SUPPLEMENT PO) Take 325 mg by mouth 2 times daily (with meals)      hydrOXYzine (ATARAX) 25 MG tablet Take 25 mg by mouth 3 times daily as needed for itching      ibuprofen (ADVIL/MOTRIN) 600 MG tablet Take 1 tablet (600 mg) by mouth every 4 hours as needed for pain (mild) 30 tablet 0    omeprazole (PRILOSEC) 20 MG DR capsule Take 20 mg by mouth daily      ondansetron (ZOFRAN-ODT) 4 MG ODT tab Take 1-2 tablets (4-8 mg) by mouth every 8 hours as needed for nausea 4 tablet 0    propranolol (INDERAL) 10 MG tablet Take 10 mg by mouth 2 times daily      sertraline (ZOLOFT) 100 MG tablet Take 200 mg by mouth       No current facility-administered medications for this visit.     HPI  Pleasant 43 year old female presents today as a(n) established patient for an ear cleaning. She was last seen on 10/9/20. She reports otorrhea from the left ear about a few months ago after it was very clogged/ had aural pressure. She states that she used  cerumen softener in that ear that opened it up. She has not had any recent changes in hearing, but states that hearing decreased when her left ear felt clogged a couple of months ago.    She denies dizziness/vertigo.    Review of Systems   Constitutional: Negative.    HENT:  Positive for ear discharge.    Eyes: Negative.    Respiratory: Negative.     Gastrointestinal: Negative.    Skin: Negative.    Neurological:  Negative for dizziness.   Endo/Heme/Allergies: Negative.        Physical Exam  Vitals and nursing note reviewed.   Constitutional:       Appearance: Normal appearance.   HENT:      Head: Normocephalic and atraumatic.      Jaw: There is normal jaw occlusion.      Right Ear: Hearing, tympanic membrane and ear canal normal. There is impacted cerumen (around the PE tube). A PE tube is present.      Left Ear: Hearing and ear canal normal.  No middle ear effusion. There is impacted cerumen. No PE tube. Tympanic membrane is retracted (slight).      Nose: No mucosal edema, congestion or rhinorrhea.      Right Nostril: No occlusion.      Left Nostril: No occlusion.      Right Turbinates: Not enlarged or swollen.      Left Turbinates: Not enlarged or swollen.      Right Sinus: No maxillary sinus tenderness or frontal sinus tenderness.      Left Sinus: No maxillary sinus tenderness or frontal sinus tenderness.      Mouth/Throat:      Mouth: Mucous membranes are moist.      Pharynx: Oropharynx is clear. Uvula midline.   Eyes:      Extraocular Movements: Extraocular movements intact.      Pupils: Pupils are equal, round, and reactive to light.   Neurological:      Mental Status: She is alert.       Ear wax removal: The patient was seen in the room. An informed consent was obtained from the patient. Her ears were evaluated under the microscope. Right ear canal was blocked 70% with ear wax that was suctioned and removed with a curette. PE tube is functioning.The left ear canal was blocked 60% with ear wax that was  suctioned with a 7 tip. She tolerated the procedure well and left the room no complications.    A/P  This pleasant patient had bilateral impacted cerumen removed today and has a right PE tubes that is open and functioning properly. There are no ear infections present. She is referred to audiology for a hearing test. Questions and concerns were addressed.    Follow up in clinic in 4 months with a hearing test.    Scribe/Staff:    Scribe Disclosure:   I, Margi Larsen, am serving as a scribe; to document services personally performed by Jordon Hou MD based on data collection and the provider's statements to me.     Provider Disclosure:  I agree with above History, Review of Systems, Physical exam and Plan.  I have reviewed the content of the documentation and have edited it as needed. I have personally performed the services documented here and the documentation accurately represents those services and the decisions I have made.      Electronically signed by:  Jordon Hou MD

## 2024-07-25 ENCOUNTER — MEDICAL CORRESPONDENCE (OUTPATIENT)
Dept: MAMMOGRAPHY | Facility: OTHER | Age: 44
End: 2024-07-25

## 2024-07-25 ENCOUNTER — TRANSFERRED RECORDS (OUTPATIENT)
Dept: HEALTH INFORMATION MANAGEMENT | Facility: CLINIC | Age: 44
End: 2024-07-25

## 2024-07-25 LAB
ALT SERPL-CCNC: 21 U/L (ref 9–41)
AST SERPL-CCNC: 27 U/L (ref 12–38)
CREATININE (EXTERNAL): 0.7 MG/DL (ref 0.7–1.4)
GFR ESTIMATED (EXTERNAL): >60 ML/MIN/1.73M2
GLUCOSE (EXTERNAL): 112 MG/DL (ref 64–112)
HPV ABSTRACT: NORMAL
PAP-ABSTRACT: NORMAL
POTASSIUM (EXTERNAL): 4.3 MMOL/L (ref 3.5–5.3)
TSH SERPL-ACNC: 3.82 UIU/ML (ref 0.34–4.94)

## 2024-07-31 ENCOUNTER — OFFICE VISIT (OUTPATIENT)
Dept: OTOLARYNGOLOGY | Facility: CLINIC | Age: 44
End: 2024-07-31
Payer: COMMERCIAL

## 2024-07-31 DIAGNOSIS — H69.93 DYSFUNCTION OF BOTH EUSTACHIAN TUBES: ICD-10-CM

## 2024-07-31 DIAGNOSIS — H66.11 CHRONIC TUBOTYMPANIC SUPPURATIVE OTITIS MEDIA OF RIGHT EAR: Primary | ICD-10-CM

## 2024-07-31 PROCEDURE — 99203 OFFICE O/P NEW LOW 30 MIN: CPT | Mod: 25 | Performed by: OTOLARYNGOLOGY

## 2024-07-31 PROCEDURE — 92504 EAR MICROSCOPY EXAMINATION: CPT | Performed by: OTOLARYNGOLOGY

## 2024-07-31 ASSESSMENT — ENCOUNTER SYMPTOMS: DIZZINESS: 0

## 2024-07-31 NOTE — LETTER
7/31/2024      Elisha Nunez  [address]  [facility] [address]      Dear Colleague,    Thank you for referring your patient, Elisha Nunez, to the [facility]. Please see a copy of my visit note below.    No chief complaint on file.     PCP: Julia Beltran     Referring Provider: No ref. provider found    There were no vitals taken for this visit.    ENT Problem List:  Patient Active Problem List   Diagnosis     Elevated liver enzymes     Chronic suppurative otitis media of right ear      Current Medications:  Current Outpatient Medications   Medication Sig Dispense Refill     amoxicillin-clavulanate (AUGMENTIN) 875-125 MG tablet Take 1 tablet by mouth 2 times daily 28 tablet 0     BUSPIRONE HCL PO Take 15 mg by mouth 2 times daily       cetirizine (ZYRTEC) 10 MG tablet Take 10 mg by mouth daily       CLONAZEPAM PO Take 0.5 mg by mouth 2 times daily       DiphenhydrAMINE HCl (BENADRYL PO) Take 50 mg by mouth nightly as needed       ergocalciferol (ERGOCALCIFEROL) 56820 units capsule Take 50,000 Units by mouth       etonogestrel-ethinyl estradiol (NUVARING) 0.12-0.015 MG/24HR vaginal ring Place 1 each vaginally every 28 days Place for 3 weeks and then remove for one week       Ferrous Sulfate (IRON SUPPLEMENT PO) Take 325 mg by mouth 2 times daily (with meals)       hydrOXYzine (ATARAX) 25 MG tablet Take 25 mg by mouth 3 times daily as needed for itching       ibuprofen (ADVIL/MOTRIN) 600 MG tablet Take 1 tablet (600 mg) by mouth every 4 hours as needed for pain (mild) 30 tablet 0     omeprazole (PRILOSEC) 20 MG DR capsule Take 20 mg by mouth daily       ondansetron (ZOFRAN-ODT) 4 MG ODT tab Take 1-2 tablets (4-8 mg) by mouth every 8 hours as needed for nausea 4 tablet 0     propranolol (INDERAL) 10 MG tablet Take 10 mg by mouth 2 times daily       sertraline (ZOLOFT) 100 MG tablet Take 200 mg by mouth       No current facility-administered medications for this visit.      HPI  Pleasant 43 year old female presents today as a(n) established patient for an ear cleaning. She was last seen on 10/9/20. She reports otorrhea from the left ear about a few months ago after it was very clogged/ had aural pressure. She states that she used cerumen softener in that ear that opened it up. She has not had any recent changes in hearing, but states that hearing decreased when her left ear felt clogged a couple of months ago.    She denies dizziness/vertigo.    Review of Systems   Constitutional: Negative.    HENT:  Positive for ear discharge.    Eyes: Negative.    Respiratory: Negative.     Gastrointestinal: Negative.    Skin: Negative.    Neurological:  Negative for dizziness.   Endo/Heme/Allergies: Negative.        Physical Exam  Vitals and nursing note reviewed.   Constitutional:       Appearance: Normal appearance.   HENT:      Head: Normocephalic and atraumatic.      Jaw: There is normal jaw occlusion.      Right Ear: Hearing, tympanic membrane and ear canal normal. There is impacted cerumen (around the PE tube). A PE tube is present.      Left Ear: Hearing and ear canal normal.  No middle ear effusion. There is impacted cerumen. No PE tube. Tympanic membrane is retracted (slight).      Nose: No mucosal edema, congestion or rhinorrhea.      Right Nostril: No occlusion.      Left Nostril: No occlusion.      Right Turbinates: Not enlarged or swollen.      Left Turbinates: Not enlarged or swollen.      Right Sinus: No maxillary sinus tenderness or frontal sinus tenderness.      Left Sinus: No maxillary sinus tenderness or frontal sinus tenderness.      Mouth/Throat:      Mouth: Mucous membranes are moist.      Pharynx: Oropharynx is clear. Uvula midline.   Eyes:      Extraocular Movements: Extraocular movements intact.      Pupils: Pupils are equal, round, and reactive to light.   Neurological:      Mental Status: She is alert.       Ear wax removal: The patient was seen in the room. An  informed consent was obtained from the patient. Her ears were evaluated under the microscope. Right ear canal was blocked 70% with ear wax that was suctioned and removed with a curette. PE tube is functioning.The left ear canal was blocked 60% with ear wax that was suctioned with a 7 tip. She tolerated the procedure well and left the room no complications.    A/P  This pleasant patient had bilateral impacted cerumen removed today and has a right PE tubes that is open and functioning properly. There are no ear infections present. She is referred to audiology for a hearing test. Questions and concerns were addressed.    Follow up in clinic in 4 months with a hearing test.    Scribe/Staff:    Scribe Disclosure:   I, Margi Larsen, am serving as a scribe; to document services personally performed by Jordon Hou MD based on data collection and the provider's statements to me.     Provider Disclosure:  I agree with above History, Review of Systems, Physical exam and Plan.  I have reviewed the content of the documentation and have edited it as needed. I have personally performed the services documented here and the documentation accurately represents those services and the decisions I have made.      Electronically signed by:  Jordon Hou MD         Again, thank you for allowing me to participate in the care of your patient.        Sincerely,        Jordon Hou MD

## 2024-07-31 NOTE — NURSING NOTE
Elisha Nunez's chief complaint for this visit includes:  Chief Complaint   Patient presents with    Follow Up     Ear cleaning. Tubes in both ears, primary unable to see the tube in eft ear.      PCP: Julia Beltran    Referring Provider:  Referred Self, MD  No address on file    There were no vitals taken for this visit.

## 2024-08-21 ENCOUNTER — TRANSFERRED RECORDS (OUTPATIENT)
Dept: HEALTH INFORMATION MANAGEMENT | Facility: CLINIC | Age: 44
End: 2024-08-21
Payer: COMMERCIAL

## 2024-08-21 ENCOUNTER — MEDICAL CORRESPONDENCE (OUTPATIENT)
Dept: ULTRASOUND IMAGING | Facility: HOSPITAL | Age: 44
End: 2024-08-21

## 2024-09-04 ENCOUNTER — HOSPITAL ENCOUNTER (EMERGENCY)
Facility: HOSPITAL | Age: 44
Discharge: HOME | End: 2024-09-05
Attending: EMERGENCY MEDICINE | Admitting: EMERGENCY MEDICINE
Payer: COMMERCIAL

## 2024-09-04 ENCOUNTER — APPOINTMENT (OUTPATIENT)
Dept: CT IMAGING | Facility: HOSPITAL | Age: 44
End: 2024-09-04
Attending: PHYSICIAN ASSISTANT
Payer: COMMERCIAL

## 2024-09-04 DIAGNOSIS — N83.201 CYSTS OF BOTH OVARIES: ICD-10-CM

## 2024-09-04 DIAGNOSIS — N83.202 CYSTS OF BOTH OVARIES: ICD-10-CM

## 2024-09-04 DIAGNOSIS — R10.9 ABDOMINAL PAIN OF UNKNOWN ETIOLOGY: ICD-10-CM

## 2024-09-04 DIAGNOSIS — R11.10 INTRACTABLE VOMITING: ICD-10-CM

## 2024-09-04 LAB
ALBUMIN SERPL BCG-MCNC: 4.5 G/DL (ref 3.5–5.2)
ALP SERPL-CCNC: 85 U/L (ref 40–150)
ALT SERPL W P-5'-P-CCNC: 13 U/L (ref 0–50)
ANION GAP SERPL CALCULATED.3IONS-SCNC: 15 MMOL/L (ref 7–15)
AST SERPL W P-5'-P-CCNC: 18 U/L (ref 0–45)
BASOPHILS # BLD AUTO: 0 10E3/UL (ref 0–0.2)
BASOPHILS NFR BLD AUTO: 0 %
BILIRUB SERPL-MCNC: 0.3 MG/DL
BUN SERPL-MCNC: 14.7 MG/DL (ref 6–20)
CALCIUM SERPL-MCNC: 9.5 MG/DL (ref 8.8–10.4)
CHLORIDE SERPL-SCNC: 100 MMOL/L (ref 98–107)
CREAT SERPL-MCNC: 0.69 MG/DL (ref 0.51–0.95)
CRP SERPL-MCNC: 28.09 MG/L
EGFRCR SERPLBLD CKD-EPI 2021: >90 ML/MIN/1.73M2
EOSINOPHIL # BLD AUTO: 0 10E3/UL (ref 0–0.7)
EOSINOPHIL NFR BLD AUTO: 0 %
ERYTHROCYTE [DISTWIDTH] IN BLOOD BY AUTOMATED COUNT: 13.1 % (ref 10–15)
GLUCOSE SERPL-MCNC: 125 MG/DL (ref 70–99)
HCO3 SERPL-SCNC: 24 MMOL/L (ref 22–29)
HCT VFR BLD AUTO: 39.8 % (ref 35–47)
HGB BLD-MCNC: 13.5 G/DL (ref 11.7–15.7)
HOLD SPECIMEN: NORMAL
IMM GRANULOCYTES # BLD: 0 10E3/UL
IMM GRANULOCYTES NFR BLD: 0 %
LIPASE SERPL-CCNC: 21 U/L (ref 13–60)
LYMPHOCYTES # BLD AUTO: 0.7 10E3/UL (ref 0.8–5.3)
LYMPHOCYTES NFR BLD AUTO: 9 %
MCH RBC QN AUTO: 29.5 PG (ref 26.5–33)
MCHC RBC AUTO-ENTMCNC: 33.9 G/DL (ref 31.5–36.5)
MCV RBC AUTO: 87 FL (ref 78–100)
MONOCYTES # BLD AUTO: 0.2 10E3/UL (ref 0–1.3)
MONOCYTES NFR BLD AUTO: 2 %
NEUTROPHILS # BLD AUTO: 7 10E3/UL (ref 1.6–8.3)
NEUTROPHILS NFR BLD AUTO: 88 %
NRBC # BLD AUTO: 0 10E3/UL
NRBC BLD AUTO-RTO: 0 /100
PLATELET # BLD AUTO: 248 10E3/UL (ref 150–450)
POTASSIUM SERPL-SCNC: 3.8 MMOL/L (ref 3.4–5.3)
PROT SERPL-MCNC: 8.1 G/DL (ref 6.4–8.3)
RBC # BLD AUTO: 4.57 10E6/UL (ref 3.8–5.2)
SODIUM SERPL-SCNC: 139 MMOL/L (ref 135–145)
WBC # BLD AUTO: 7.9 10E3/UL (ref 4–11)

## 2024-09-04 PROCEDURE — 250N000009 HC RX 250: Performed by: PHYSICIAN ASSISTANT

## 2024-09-04 PROCEDURE — 99284 EMERGENCY DEPT VISIT MOD MDM: CPT | Performed by: PHYSICIAN ASSISTANT

## 2024-09-04 PROCEDURE — 86140 C-REACTIVE PROTEIN: CPT | Performed by: PHYSICIAN ASSISTANT

## 2024-09-04 PROCEDURE — 82040 ASSAY OF SERUM ALBUMIN: CPT | Performed by: EMERGENCY MEDICINE

## 2024-09-04 PROCEDURE — 96374 THER/PROPH/DIAG INJ IV PUSH: CPT | Mod: XU

## 2024-09-04 PROCEDURE — 83690 ASSAY OF LIPASE: CPT | Performed by: PHYSICIAN ASSISTANT

## 2024-09-04 PROCEDURE — 96375 TX/PRO/DX INJ NEW DRUG ADDON: CPT

## 2024-09-04 PROCEDURE — 99285 EMERGENCY DEPT VISIT HI MDM: CPT | Mod: 25

## 2024-09-04 PROCEDURE — 250N000011 HC RX IP 250 OP 636: Performed by: PHYSICIAN ASSISTANT

## 2024-09-04 PROCEDURE — 93010 ELECTROCARDIOGRAM REPORT: CPT | Performed by: INTERNAL MEDICINE

## 2024-09-04 PROCEDURE — 258N000003 HC RX IP 258 OP 636: Performed by: PHYSICIAN ASSISTANT

## 2024-09-04 PROCEDURE — 74177 CT ABD & PELVIS W/CONTRAST: CPT

## 2024-09-04 PROCEDURE — 96361 HYDRATE IV INFUSION ADD-ON: CPT

## 2024-09-04 PROCEDURE — 93005 ELECTROCARDIOGRAM TRACING: CPT

## 2024-09-04 PROCEDURE — 36415 COLL VENOUS BLD VENIPUNCTURE: CPT | Performed by: EMERGENCY MEDICINE

## 2024-09-04 PROCEDURE — 85049 AUTOMATED PLATELET COUNT: CPT | Performed by: EMERGENCY MEDICINE

## 2024-09-04 RX ORDER — HYDROMORPHONE HYDROCHLORIDE 1 MG/ML
0.5 INJECTION, SOLUTION INTRAMUSCULAR; INTRAVENOUS; SUBCUTANEOUS
Status: DISCONTINUED | OUTPATIENT
Start: 2024-09-04 | End: 2024-09-05 | Stop reason: HOSPADM

## 2024-09-04 RX ORDER — METOCLOPRAMIDE HYDROCHLORIDE 5 MG/ML
10 INJECTION INTRAMUSCULAR; INTRAVENOUS ONCE
Status: COMPLETED | OUTPATIENT
Start: 2024-09-04 | End: 2024-09-04

## 2024-09-04 RX ORDER — KETOROLAC TROMETHAMINE 30 MG/ML
30 INJECTION, SOLUTION INTRAMUSCULAR; INTRAVENOUS ONCE
Status: COMPLETED | OUTPATIENT
Start: 2024-09-04 | End: 2024-09-04

## 2024-09-04 RX ORDER — DROPERIDOL 2.5 MG/ML
1.25 INJECTION, SOLUTION INTRAMUSCULAR; INTRAVENOUS ONCE
Status: COMPLETED | OUTPATIENT
Start: 2024-09-04 | End: 2024-09-04

## 2024-09-04 RX ORDER — SCOLOPAMINE TRANSDERMAL SYSTEM 1 MG/1
1 PATCH, EXTENDED RELEASE TRANSDERMAL
Status: DISCONTINUED | OUTPATIENT
Start: 2024-09-04 | End: 2024-09-05 | Stop reason: HOSPADM

## 2024-09-04 RX ORDER — ONDANSETRON 4 MG/1
8 TABLET, ORALLY DISINTEGRATING ORAL ONCE
Status: COMPLETED | OUTPATIENT
Start: 2024-09-04 | End: 2024-09-04

## 2024-09-04 RX ORDER — IOPAMIDOL 755 MG/ML
129 INJECTION, SOLUTION INTRAVASCULAR ONCE
Status: COMPLETED | OUTPATIENT
Start: 2024-09-04 | End: 2024-09-04

## 2024-09-04 RX ADMIN — METOCLOPRAMIDE 10 MG: 5 INJECTION, SOLUTION INTRAMUSCULAR; INTRAVENOUS at 19:20

## 2024-09-04 RX ADMIN — IOPAMIDOL 129 ML: 755 INJECTION, SOLUTION INTRAVENOUS at 21:10

## 2024-09-04 RX ADMIN — SODIUM CHLORIDE 1000 ML: 9 INJECTION, SOLUTION INTRAVENOUS at 19:19

## 2024-09-04 RX ADMIN — DROPERIDOL 1.25 MG: 2.5 INJECTION, SOLUTION INTRAMUSCULAR; INTRAVENOUS at 23:02

## 2024-09-04 RX ADMIN — ONDANSETRON 8 MG: 4 TABLET, ORALLY DISINTEGRATING ORAL at 17:59

## 2024-09-04 RX ADMIN — KETOROLAC TROMETHAMINE 30 MG: 30 INJECTION, SOLUTION INTRAMUSCULAR at 20:19

## 2024-09-04 RX ADMIN — SCOPALAMINE 1 PATCH: 1 PATCH, EXTENDED RELEASE TRANSDERMAL at 21:28

## 2024-09-04 ASSESSMENT — ENCOUNTER SYMPTOMS
VOMITING: 1
FEVER: 0
NAUSEA: 1
ABDOMINAL PAIN: 1
CHILLS: 0
APPETITE CHANGE: 1

## 2024-09-04 ASSESSMENT — ACTIVITIES OF DAILY LIVING (ADL)
ADLS_ACUITY_SCORE: 35

## 2024-09-04 ASSESSMENT — COLUMBIA-SUICIDE SEVERITY RATING SCALE - C-SSRS
2. HAVE YOU ACTUALLY HAD ANY THOUGHTS OF KILLING YOURSELF IN THE PAST MONTH?: NO
6. HAVE YOU EVER DONE ANYTHING, STARTED TO DO ANYTHING, OR PREPARED TO DO ANYTHING TO END YOUR LIFE?: NO
1. IN THE PAST MONTH, HAVE YOU WISHED YOU WERE DEAD OR WISHED YOU COULD GO TO SLEEP AND NOT WAKE UP?: NO

## 2024-09-04 NOTE — ED TRIAGE NOTES
"\"Started the 0.5 mg of Wegovy yesterday.  I am having abdominal pain since this past Saturday.  Last night I started to have nausea and vomiting.  I have been vomiting a lot today.\"        "

## 2024-09-04 NOTE — ED NOTES
Patient presents w/ c/o N/V since upping her dose of Wegovy.   Patient took po Zofran, no relief.   A&Ox4. No other complaints.   Denies urinary S/S.

## 2024-09-04 NOTE — ED PROVIDER NOTES
History     Chief Complaint   Patient presents with    Abdominal Pain    Nausea & Vomiting     The history is provided by the patient.     Elisha Nunez is a 44 year old female who presented to the emergency department ambulatory for evaluation of nausea.  Recently increased her dosage of Wegovy and symptoms began at that time.  No significant diarrhea.  No abdominal discomfort.  No hematochezia or melena.  No hematemesis.    After long discussion the patient reports that she has been having intermittent chronic right lower quadrant and left lower quadrant abdominal pain.  She has been seen by her primary care at [facility] and had an ultrasound of the pelvis ordered that that she did not have accomplished the end of August.    Allergies:  Allergies   Allergen Reactions    Seasonal Allergies        Problem List:    Patient Active Problem List    Diagnosis Date Noted    Chronic suppurative otitis media of right ear 10/12/2017     Priority: Medium    Elevated liver enzymes 09/01/2015     Priority: Medium     ultrasound showed enlarged liver.           Past Medical History:    Past Medical History:   Diagnosis Date    Anxiety     Depression     Elevated liver enzymes 9/2015    Hearing loss     Noninfectious ileitis     Otitis media     PONV (postoperative nausea and vomiting)        Past Surgical History:    Past Surgical History:   Procedure Laterality Date    ANTROSTOMY NASAL Bilateral 9/23/2019    Procedure: Bilateral Maxillary Antrostomy;  Surgeon: Stephan Crowley MD;  [facility]    CANALPLASTY Right 9/17/2018    Procedure: CANALPLASTY;;  Surgeon: Stephan Crowley MD;  [facility]    ETHMOIDECTOMY Bilateral 9/23/2019    Procedure: Bilateral Anterior Ethmoidectomy;  Surgeon: tSephan Crowley MD;  [facility]    GRAFT THIERSCH STATUS POST TYMPANOMASTOIDECTOMY Right 10/23/2017    Procedure: GRAFT THIERSCH STATUS POST TYMPANOMASTOIDECTOMY;  Right Removal of Granulation Tissue, Removal of  Packing and Sutures, Thiersch Graft;  Surgeon: Stephan Crowley MD;  [facility]    GRAFT THIERSCH STATUS POST TYMPANOMASTOIDECTOMY Right 9/17/2018    Procedure: GRAFT THIERSCH STATUS POST TYMPANOMASTOIDECTOMY;  Right Ear Removal Of Granulation Tissue,  Canalplasty,   Meatoplasty,   Right Thiersch Graft,  Removal of Right Ear Existing Pressure Equalization Tube,  Myringotomy with placement of Paparella #2 Pressure Equalization Tube,  Myringoplasty  ;  Surgeon: Stephan Crowley MD;  [facility]    MASTOIDECTOMY Right 10/12/2017    Procedure: MASTOIDECTOMY;;  Surgeon: Stephan Crowley MD;  [facility]    MEATOPLASTY EAR Right 10/12/2017    Procedure: MEATOPLASTY EAR;;  Surgeon: Stephan Crowley MD;  [facility]    MEATOPLASTY EAR Right 5/9/2019    Procedure: Right Ear Meatoplasty;  Surgeon: Stephan Crowley MD;  [facility]    MYRINGOTOMY, INSERT TUBE BILATERAL, COMBINED Bilateral 5/14/2015    Procedure: COMBINED MYRINGOTOMY, INSERT TUBE BILATERAL;  Surgeon: Stephan Crowley MD;  [facility]    PE TUBES      SEPTOPLASTY, TURBINOPLASTY, COMBINED N/A 9/23/2019    Procedure: Endoscopic Septoplasty, Bilateral Inferior Turbinate Reduction;  Surgeon: Stephan Crowley MD;  [facility]    TONSILLECTOMY      TYMPANOPLASTY Left 5/14/2015    Procedure: TYMPANOPLASTY;  Surgeon: Stephan Crowley MD;  [facility]    TYMPANOPLASTY Right 10/12/2017    Procedure: TYMPANOPLASTY;  Right Endural Flexiable Tympanoplasty, Middle Ear Reconstruction,Mastoidectomy, Myringotomy and Tube (#2 Paparella tube), Meatoplasty ;  Surgeon: Stephan Crowley MD;  [facility]    TYMPANOPLASTY Right 5/9/2019    Procedure: Right Revision Tympanoplasty Flexible Approach, Middle Ear Reconstruction, Myringotomy and Tube;  Surgeon: Stephan Crowley MD;  [facility]       Family History:    No family history on file.    Social History:  Marital Status:  Single [1]  Social History     Tobacco  Use    Smoking status: Never    Smokeless tobacco: Never   Substance Use Topics    Alcohol use: Yes     Comment: rare    Drug use: No        Medications:    OLANZapine zydis (ZYPREXA) 5 MG ODT  ondansetron (ZOFRAN ODT) 4 MG ODT tab  amoxicillin-clavulanate (AUGMENTIN) 875-125 MG tablet  BUSPIRONE HCL PO  cetirizine (ZYRTEC) 10 MG tablet  CLONAZEPAM PO  DiphenhydrAMINE HCl (BENADRYL PO)  ergocalciferol (ERGOCALCIFEROL) 44798 units capsule  etonogestrel-ethinyl estradiol (NUVARING) 0.12-0.015 MG/24HR vaginal ring  Ferrous Sulfate (IRON SUPPLEMENT PO)  hydrOXYzine (ATARAX) 25 MG tablet  ibuprofen (ADVIL/MOTRIN) 600 MG tablet  omeprazole (PRILOSEC) 20 MG DR capsule  ondansetron (ZOFRAN-ODT) 4 MG ODT tab  propranolol (INDERAL) 10 MG tablet  sertraline (ZOLOFT) 100 MG tablet          Review of Systems   Constitutional:  Positive for appetite change. Negative for chills and fever.   Cardiovascular:  Negative for chest pain.   Gastrointestinal:  Positive for abdominal pain, nausea and vomiting.   Genitourinary:  Negative for pelvic pain and urgency.   Skin: Negative.        Physical Exam   BP: (!) 181/85  Pulse: 97  Temp: 98.7  F (37.1  C)  Resp: 18  Weight: 119.3 kg (263 lb 1.6 oz)  SpO2: 97 %      Physical Exam  Vitals and nursing note reviewed.   Constitutional:       General: She is not in acute distress.     Appearance: Normal appearance. She is not ill-appearing, toxic-appearing or diaphoretic.   Cardiovascular:      Rate and Rhythm: Normal rate and regular rhythm.   Pulmonary:      Effort: Pulmonary effort is normal.   Abdominal:      Palpations: Abdomen is soft.      Comments: She has no evidence of acute abdomen.  She has no guarding.  She has no focal tenderness.  She does complain of some mild increasing tenderness upon deep palpation of the right lower quadrant.   Skin:     General: Skin is warm and dry.      Capillary Refill: Capillary refill takes less than 2 seconds.   Neurological:      General: No focal  deficit present.      Mental Status: She is alert and oriented to person, place, and time.   Psychiatric:         Mood and Affect: Mood normal.         ED Course     ED Course as of 09/05/24 0019   Wed Sep 04, 2024   1850 Patient is still nauseous.  Plan will be for IV with IV Reglan and IV fluids.   2258 SOR 44F with intractable nausea. Some mild abdominal pain, but that has been chronic and pt undergoing workup for ovarian pathology. Inconsistent with ovarian torsion. CT with RLQ inflammatory changes, could consider appendicitis, but inconsistent with history. Pending CRP and UA. Could consider watching her overnight in the ED, would recommend discussion with surgery given radiology read   u Sep 05, 2024   0005 I examined the patient.  There is no right lower quadrant tenderness to palpation.  Negative Rovsing sign.  No rebound tenderness.  No migration of pain into the right lower quadrant.  The nausea that brought her in did not start in concert with the abdominal pain.  The abdominal pain has been intermittent for quite some time prior.  She is afebrile.  Vitally stable.  No leukocytosis mild elevation in CRP which is nonspecific.  Most likely etiology is from her Wegovy, however, given the radiology read I offered to observe her overnight.  We also discussed potentially going home and she is asking to go home, therefore, I will ask for her to follow-up in surgery clinic tomorrow and will send a message to the surgeons today     Procedures              Critical Care time:  none               Results for orders placed or performed during the hospital encounter of 09/04/24 (from the past 24 hour(s))   [facility] Draw    Narrative    The following orders were created for panel order [facility] Draw.  Procedure                               Abnormality         Status                     ---------                               -----------         ------                     Extra Blue Top Tube[[id number]]                               Final result               Extra Red Top Tube[[id number]]                               Final result               Extra Green Top (Lithium...[[id number]]                                                 Extra Purple Top Tube[[id number]]                                                       Extra Green Top (Lithium...[[id number]]                      Final result                 Please view results for these tests on the individual orders.   CBC with platelets differential    Narrative    The following orders were created for panel order CBC with platelets differential.  Procedure                               Abnormality         Status                     ---------                               -----------         ------                     CBC with platelets and d...[[id number]]  Abnormal            Final result                 Please view results for these tests on the individual orders.   Comprehensive metabolic panel   Result Value Ref Range    Sodium 139 135 - 145 mmol/L    Potassium 3.8 3.4 - 5.3 mmol/L    Carbon Dioxide (CO2) 24 22 - 29 mmol/L    Anion Gap 15 7 - 15 mmol/L    Urea Nitrogen 14.7 6.0 - 20.0 mg/dL    Creatinine 0.69 0.51 - 0.95 mg/dL    GFR Estimate >90 >60 mL/min/1.73m2    Calcium 9.5 8.8 - 10.4 mg/dL    Chloride 100 98 - 107 mmol/L    Glucose 125 (H) 70 - 99 mg/dL    Alkaline Phosphatase 85 40 - 150 U/L    AST 18 0 - 45 U/L    ALT 13 0 - 50 U/L    Protein Total 8.1 6.4 - 8.3 g/dL    Albumin 4.5 3.5 - 5.2 g/dL    Bilirubin Total 0.3 <=1.2 mg/dL   Extra Blue Top Tube   Result Value Ref Range    Hold Specimen JIC    Extra Red Top Tube   Result Value Ref Range    Hold Specimen JIC    Extra Green Top (Lithium Heparin) ON ICE   Result Value Ref Range    Hold Specimen JIC    CBC with platelets and differential   Result Value Ref Range    WBC Count 7.9 4.0 - 11.0 10e3/uL    RBC Count 4.57 3.80 - 5.20 10e6/uL    Hemoglobin 13.5 11.7 - 15.7 g/dL    Hematocrit 39.8 35.0 - 47.0 %    MCV 87 78 - 100  fL    MCH 29.5 26.5 - 33.0 pg    MCHC 33.9 31.5 - 36.5 g/dL    RDW 13.1 10.0 - 15.0 %    Platelet Count 248 150 - 450 10e3/uL    % Neutrophils 88 %    % Lymphocytes 9 %    % Monocytes 2 %    % Eosinophils 0 %    % Basophils 0 %    % Immature Granulocytes 0 %    NRBCs per 100 WBC 0 <1 /100    Absolute Neutrophils 7.0 1.6 - 8.3 10e3/uL    Absolute Lymphocytes 0.7 (L) 0.8 - 5.3 10e3/uL    Absolute Monocytes 0.2 0.0 - 1.3 10e3/uL    Absolute Eosinophils 0.0 0.0 - 0.7 10e3/uL    Absolute Basophils 0.0 0.0 - 0.2 10e3/uL    Absolute Immature Granulocytes 0.0 <=0.4 10e3/uL    Absolute NRBCs 0.0 10e3/uL   Extra Tube    Narrative    The following orders were created for panel order Extra Tube.  Procedure                               Abnormality         Status                     ---------                               -----------         ------                     Extra Heparinized Syringe[[id number]]                        Final result                 Please view results for these tests on the individual orders.   Extra Heparinized Syringe   Result Value Ref Range    Hold Specimen JIC    Lipase   Result Value Ref Range    Lipase 21 13 - 60 U/L   CRP inflammation   Result Value Ref Range    CRP Inflammation 28.09 (H) <5.00 mg/L   EKG 12-lead, tracing only   Result Value Ref Range    Systolic Blood Pressure  mmHg    Diastolic Blood Pressure  mmHg    Ventricular Rate 79 BPM    Atrial Rate 79 BPM    NV Interval 150 ms    QRS Duration 88 ms     ms    QTc 447 ms    P Axis 23 degrees    R AXIS 18 degrees    T Axis 54 degrees    Interpretation ECG       Sinus rhythm  T wave abnormality, consider anterior ischemia  Abnormal ECG  No previous ECGs available         Medications   HYDROmorphone (PF) (DILAUDID) injection 0.5 mg (has no administration in time range)   scopolamine (TRANSDERM) 72 hr patch 1 patch (1 patch Transdermal $Patch/Med Applied 9/4/24 6798)   ondansetron (ZOFRAN ODT) ODT tab 8 mg (8 mg Oral $Given 9/4/24 8408)    sodium chloride 0.9% BOLUS 1,000 mL (0 mLs Intravenous Stopped 9/4/24 2022)   metoclopramide (REGLAN) injection 10 mg (10 mg Intravenous $Given 9/4/24 1920)   ketorolac (TORADOL) injection 30 mg (30 mg Intravenous $Given 9/4/24 2019)   iopamidol (ISOVUE-370) solution 129 mL (129 mLs Intravenous $Given 9/4/24 2110)   sodium chloride (PF) 0.9% PF flush 60 mL (60 mLs Intravenous $Given 9/4/24 2110)   droPERidol (INAPSINE) injection 1.25 mg (1.25 mg Intravenous $Given 9/4/24 2302)       Assessments & Plan (with Medical Decision Making)   44-year-old female with intractable nausea and vomiting as well as abdominal pain of unknown etiology.  The abdominal pain seems to be mostly chronic in nature with episodes of flares.  However it does seem to be mostly right lower quadrant in nature but ongoing for several months.  I doubt that this patient is suffering from a catastrophic intra-abdominal pathology to include a ruptured appendicitis.  However, given the CT scan findings I will add on a CRP for further delineation.  She was treated with a multitude of antiemetics in the emergency department.  Signed out to Dr. Mukherjee on routine change of shift pending further laboratory evaluation and symptom improvement.    This document was prepared using a combination of typing and voice generated software.  While every attempt was made for accuracy, spelling and grammatical errors may exist.     I have reviewed the nursing notes.    I have reviewed the findings, diagnosis, plan and need for follow up with the patient.           Medical Decision Making  The patient's presentation was of moderate complexity (an undiagnosed new problem with uncertain prognosis).    The patient's evaluation involved:  ordering and/or review of 3+ test(s) in this encounter (labs and CT scan)    The patient's management necessitated moderate risk (prescription drug management including medications given in the ED) and moderate risk (IV contrast  administration).        New Prescriptions    OLANZAPINE ZYDIS (ZYPREXA) 5 MG ODT    Take 1 tablet (5 mg) by mouth every 8 hours as needed for other (nausea and vomiting).    ONDANSETRON (ZOFRAN ODT) 4 MG ODT TAB    Take 1 tablet (4 mg) by mouth every 8 hours as needed for nausea.       Final diagnoses:   Intractable vomiting   Abdominal pain of unknown etiology   Cysts of both ovaries       9/4/2024   [facility]       Ritu Xiao PA-C  09/04/24 8138       Bob Mukherjee MD  09/05/24 0019

## 2024-09-05 ENCOUNTER — APPOINTMENT (OUTPATIENT)
Dept: ULTRASOUND IMAGING | Facility: HOSPITAL | Age: 44
End: 2024-09-05
Attending: PHYSICIAN ASSISTANT
Payer: COMMERCIAL

## 2024-09-05 ENCOUNTER — HOSPITAL ENCOUNTER (EMERGENCY)
Facility: HOSPITAL | Age: 44
Discharge: HOME | End: 2024-09-05
Attending: PHYSICIAN ASSISTANT | Admitting: PHYSICIAN ASSISTANT
Payer: COMMERCIAL

## 2024-09-05 VITALS
OXYGEN SATURATION: 96 % | HEART RATE: 73 BPM | TEMPERATURE: 98.2 F | SYSTOLIC BLOOD PRESSURE: 178 MMHG | RESPIRATION RATE: 16 BRPM | DIASTOLIC BLOOD PRESSURE: 87 MMHG

## 2024-09-05 VITALS
TEMPERATURE: 98.7 F | OXYGEN SATURATION: 96 % | BODY MASS INDEX: 46.61 KG/M2 | DIASTOLIC BLOOD PRESSURE: 77 MMHG | SYSTOLIC BLOOD PRESSURE: 132 MMHG | RESPIRATION RATE: 20 BRPM | HEART RATE: 86 BPM | WEIGHT: 263.1 LBS

## 2024-09-05 DIAGNOSIS — R11.10 INTRACTABLE VOMITING: ICD-10-CM

## 2024-09-05 DIAGNOSIS — N39.0 UTI (URINARY TRACT INFECTION): ICD-10-CM

## 2024-09-05 LAB
ALBUMIN SERPL BCG-MCNC: 4.4 G/DL (ref 3.5–5.2)
ALBUMIN UR-MCNC: 50 MG/DL
ALP SERPL-CCNC: 78 U/L (ref 40–150)
ALT SERPL W P-5'-P-CCNC: 16 U/L (ref 0–50)
ANION GAP SERPL CALCULATED.3IONS-SCNC: 15 MMOL/L (ref 7–15)
APPEARANCE UR: ABNORMAL
AST SERPL W P-5'-P-CCNC: 22 U/L (ref 0–45)
ATRIAL RATE - MUSE: 79 BPM
BACTERIA #/AREA URNS HPF: ABNORMAL /HPF
BASOPHILS # BLD AUTO: 0 10E3/UL (ref 0–0.2)
BASOPHILS NFR BLD AUTO: 0 %
BILIRUB SERPL-MCNC: 0.4 MG/DL
BILIRUB UR QL STRIP: NEGATIVE
BUN SERPL-MCNC: 12.8 MG/DL (ref 6–20)
CALCIUM SERPL-MCNC: 9.1 MG/DL (ref 8.8–10.4)
CHLORIDE SERPL-SCNC: 99 MMOL/L (ref 98–107)
COLOR UR AUTO: YELLOW
CREAT SERPL-MCNC: 0.71 MG/DL (ref 0.51–0.95)
CRP SERPL-MCNC: 13.2 MG/L
DIASTOLIC BLOOD PRESSURE - MUSE: NORMAL MMHG
EGFRCR SERPLBLD CKD-EPI 2021: >90 ML/MIN/1.73M2
EOSINOPHIL # BLD AUTO: 0 10E3/UL (ref 0–0.7)
EOSINOPHIL NFR BLD AUTO: 0 %
ERYTHROCYTE [DISTWIDTH] IN BLOOD BY AUTOMATED COUNT: 13.1 % (ref 10–15)
GLUCOSE SERPL-MCNC: 109 MG/DL (ref 70–99)
GLUCOSE UR STRIP-MCNC: NEGATIVE MG/DL
HCG UR QL: NEGATIVE
HCO3 SERPL-SCNC: 23 MMOL/L (ref 22–29)
HCT VFR BLD AUTO: 38.9 % (ref 35–47)
HGB BLD-MCNC: 13.2 G/DL (ref 11.7–15.7)
HGB UR QL STRIP: ABNORMAL
HOLD SPECIMEN: NORMAL
IMM GRANULOCYTES # BLD: 0.1 10E3/UL
IMM GRANULOCYTES NFR BLD: 1 %
INTERPRETATION ECG - MUSE: NORMAL
KETONES UR STRIP-MCNC: ABNORMAL MG/DL
LEUKOCYTE ESTERASE UR QL STRIP: ABNORMAL
LYMPHOCYTES # BLD AUTO: 1.2 10E3/UL (ref 0.8–5.3)
LYMPHOCYTES NFR BLD AUTO: 13 %
MCH RBC QN AUTO: 29.4 PG (ref 26.5–33)
MCHC RBC AUTO-ENTMCNC: 33.9 G/DL (ref 31.5–36.5)
MCV RBC AUTO: 87 FL (ref 78–100)
MONOCYTES # BLD AUTO: 0.5 10E3/UL (ref 0–1.3)
MONOCYTES NFR BLD AUTO: 5 %
MUCOUS THREADS #/AREA URNS LPF: PRESENT /LPF
NEUTROPHILS # BLD AUTO: 7.7 10E3/UL (ref 1.6–8.3)
NEUTROPHILS NFR BLD AUTO: 81 %
NITRATE UR QL: NEGATIVE
NRBC # BLD AUTO: 0 10E3/UL
NRBC BLD AUTO-RTO: 0 /100
P AXIS - MUSE: 23 DEGREES
PH UR STRIP: 5.5 [PH] (ref 4.7–8)
PLATELET # BLD AUTO: 242 10E3/UL (ref 150–450)
POTASSIUM SERPL-SCNC: 3.5 MMOL/L (ref 3.4–5.3)
PR INTERVAL - MUSE: 150 MS
PROT SERPL-MCNC: 8.1 G/DL (ref 6.4–8.3)
QRS DURATION - MUSE: 88 MS
QT - MUSE: 390 MS
QTC - MUSE: 447 MS
R AXIS - MUSE: 18 DEGREES
RBC # BLD AUTO: 4.49 10E6/UL (ref 3.8–5.2)
RBC URINE: 81 /HPF
SODIUM SERPL-SCNC: 137 MMOL/L (ref 135–145)
SP GR UR STRIP: 1.03 (ref 1–1.03)
SQUAMOUS EPITHELIAL: 2 /HPF
SYSTOLIC BLOOD PRESSURE - MUSE: NORMAL MMHG
T AXIS - MUSE: 54 DEGREES
TROPONIN T SERPL HS-MCNC: <6 NG/L
UROBILINOGEN UR STRIP-MCNC: NORMAL MG/DL
VENTRICULAR RATE- MUSE: 79 BPM
WBC # BLD AUTO: 9.5 10E3/UL (ref 4–11)
WBC URINE: 34 /HPF

## 2024-09-05 PROCEDURE — 99284 EMERGENCY DEPT VISIT MOD MDM: CPT | Performed by: PHYSICIAN ASSISTANT

## 2024-09-05 PROCEDURE — 81001 URINALYSIS AUTO W/SCOPE: CPT | Performed by: PHYSICIAN ASSISTANT

## 2024-09-05 PROCEDURE — 82040 ASSAY OF SERUM ALBUMIN: CPT | Performed by: PHYSICIAN ASSISTANT

## 2024-09-05 PROCEDURE — 96365 THER/PROPH/DIAG IV INF INIT: CPT

## 2024-09-05 PROCEDURE — 81025 URINE PREGNANCY TEST: CPT | Performed by: PHYSICIAN ASSISTANT

## 2024-09-05 PROCEDURE — 85025 COMPLETE CBC W/AUTO DIFF WBC: CPT | Performed by: PHYSICIAN ASSISTANT

## 2024-09-05 PROCEDURE — 250N000011 HC RX IP 250 OP 636: Performed by: PHYSICIAN ASSISTANT

## 2024-09-05 PROCEDURE — 86140 C-REACTIVE PROTEIN: CPT | Performed by: PHYSICIAN ASSISTANT

## 2024-09-05 PROCEDURE — 96376 TX/PRO/DX INJ SAME DRUG ADON: CPT

## 2024-09-05 PROCEDURE — 76856 US EXAM PELVIC COMPLETE: CPT

## 2024-09-05 PROCEDURE — 96375 TX/PRO/DX INJ NEW DRUG ADDON: CPT

## 2024-09-05 PROCEDURE — 87086 URINE CULTURE/COLONY COUNT: CPT | Performed by: PHYSICIAN ASSISTANT

## 2024-09-05 PROCEDURE — 76830 TRANSVAGINAL US NON-OB: CPT

## 2024-09-05 PROCEDURE — 84484 ASSAY OF TROPONIN QUANT: CPT | Performed by: PHYSICIAN ASSISTANT

## 2024-09-05 PROCEDURE — 99285 EMERGENCY DEPT VISIT HI MDM: CPT | Mod: 25,27

## 2024-09-05 PROCEDURE — 36415 COLL VENOUS BLD VENIPUNCTURE: CPT | Performed by: PHYSICIAN ASSISTANT

## 2024-09-05 PROCEDURE — 258N000003 HC RX IP 258 OP 636: Performed by: PHYSICIAN ASSISTANT

## 2024-09-05 RX ORDER — CEPHALEXIN 500 MG/1
500 CAPSULE ORAL 3 TIMES DAILY
Qty: 21 CAPSULE | Refills: 0 | Status: SHIPPED | OUTPATIENT
Start: 2024-09-05 | End: 2024-09-12

## 2024-09-05 RX ORDER — CEFTRIAXONE SODIUM 2 G/50ML
2 INJECTION, SOLUTION INTRAVENOUS ONCE
Status: COMPLETED | OUTPATIENT
Start: 2024-09-05 | End: 2024-09-05

## 2024-09-05 RX ORDER — DROPERIDOL 2.5 MG/ML
1.25 INJECTION, SOLUTION INTRAMUSCULAR; INTRAVENOUS ONCE
Status: COMPLETED | OUTPATIENT
Start: 2024-09-05 | End: 2024-09-05

## 2024-09-05 RX ORDER — METOCLOPRAMIDE 10 MG/1
10 TABLET ORAL 3 TIMES DAILY PRN
Qty: 15 TABLET | Refills: 0 | Status: SHIPPED | OUTPATIENT
Start: 2024-09-05

## 2024-09-05 RX ORDER — ONDANSETRON 4 MG/1
4 TABLET, ORALLY DISINTEGRATING ORAL EVERY 8 HOURS PRN
Qty: 10 TABLET | Refills: 0 | Status: SHIPPED | OUTPATIENT
Start: 2024-09-05 | End: 2024-09-09

## 2024-09-05 RX ORDER — DROPERIDOL 2.5 MG/ML
2.5 INJECTION, SOLUTION INTRAMUSCULAR; INTRAVENOUS ONCE
Status: DISCONTINUED | OUTPATIENT
Start: 2024-09-05 | End: 2024-09-05

## 2024-09-05 RX ORDER — SEMAGLUTIDE 0.5 MG/.5ML
0.5 INJECTION, SOLUTION SUBCUTANEOUS
COMMUNITY
Start: 2024-08-21

## 2024-09-05 RX ORDER — OLANZAPINE 5 MG/1
5 TABLET, ORALLY DISINTEGRATING ORAL EVERY 8 HOURS PRN
Qty: 20 TABLET | Refills: 0 | Status: SHIPPED | OUTPATIENT
Start: 2024-09-05

## 2024-09-05 RX ADMIN — DROPERIDOL 1.25 MG: 2.5 INJECTION, SOLUTION INTRAMUSCULAR; INTRAVENOUS at 14:46

## 2024-09-05 RX ADMIN — CEFTRIAXONE SODIUM 2 G: 2 INJECTION, SOLUTION INTRAVENOUS at 14:49

## 2024-09-05 RX ADMIN — SODIUM CHLORIDE 1000 ML: 9 INJECTION, SOLUTION INTRAVENOUS at 14:26

## 2024-09-05 RX ADMIN — DROPERIDOL 1.25 MG: 2.5 INJECTION, SOLUTION INTRAMUSCULAR; INTRAVENOUS at 17:18

## 2024-09-05 ASSESSMENT — ENCOUNTER SYMPTOMS
SHORTNESS OF BREATH: 0
FATIGUE: 0
COUGH: 0
NAUSEA: 1
ABDOMINAL PAIN: 1
VOMITING: 1
FEVER: 0
APPETITE CHANGE: 1

## 2024-09-05 ASSESSMENT — COLUMBIA-SUICIDE SEVERITY RATING SCALE - C-SSRS
2. HAVE YOU ACTUALLY HAD ANY THOUGHTS OF KILLING YOURSELF IN THE PAST MONTH?: NO
1. IN THE PAST MONTH, HAVE YOU WISHED YOU WERE DEAD OR WISHED YOU COULD GO TO SLEEP AND NOT WAKE UP?: NO
6. HAVE YOU EVER DONE ANYTHING, STARTED TO DO ANYTHING, OR PREPARED TO DO ANYTHING TO END YOUR LIFE?: NO

## 2024-09-05 ASSESSMENT — ACTIVITIES OF DAILY LIVING (ADL)
ADLS_ACUITY_SCORE: 35

## 2024-09-05 NOTE — ED PROVIDER NOTES
History     Chief Complaint   Patient presents with    Vomiting    Abdominal Pain     The history is provided by the patient.     Elisha Nunez is a 44 year old female who presented to the emergency department ambulatory along with mother for evaluation of persistent nausea and vomiting.  The patient was seen yesterday in the emergency department for similar and had an abnormal CT scan that was scheduled for follow-up today at the general surgery clinic.  Unfortunately the patient did not arrive.  She presented back care for persistent nausea and vomiting.  She has no significant abdominal discomfort at this time.  Denies any hematochezia or melena.    Allergies:  Allergies   Allergen Reactions    Seasonal Allergies        Problem List:    Patient Active Problem List    Diagnosis Date Noted    Chronic suppurative otitis media of right ear 10/12/2017     Priority: Medium    Elevated liver enzymes 09/01/2015     Priority: Medium     ultrasound showed enlarged liver.           Past Medical History:    Past Medical History:   Diagnosis Date    Anxiety     Depression     Elevated liver enzymes 9/2015    Hearing loss     Noninfectious ileitis     Otitis media     PONV (postoperative nausea and vomiting)        Past Surgical History:    Past Surgical History:   Procedure Laterality Date    ANTROSTOMY NASAL Bilateral 9/23/2019    Procedure: Bilateral Maxillary Antrostomy;  Surgeon: Stephan Crowley MD;  [facility]    CANALPLASTY Right 9/17/2018    Procedure: CANALPLASTY;;  Surgeon: Stephan Crowley MD;  [facility]    ETHMOIDECTOMY Bilateral 9/23/2019    Procedure: Bilateral Anterior Ethmoidectomy;  Surgeon: Stephan Crowley MD;  [facility]    GRAFT THIERSCH STATUS POST TYMPANOMASTOIDECTOMY Right 10/23/2017    Procedure: GRAFT THIERSCH STATUS POST TYMPANOMASTOIDECTOMY;  Right Removal of Granulation Tissue, Removal of Packing and Sutures, Thiersch Graft;  Surgeon: Stephan Crowley MD;  [facility]    GRAFT THIERSCH STATUS POST TYMPANOMASTOIDECTOMY Right 9/17/2018    Procedure: GRAFT THIERSCH STATUS POST TYMPANOMASTOIDECTOMY;  Right Ear Removal Of Granulation Tissue,  Canalplasty,   Meatoplasty,   Right Thiersch Graft,  Removal of Right Ear Existing Pressure Equalization Tube,  Myringotomy with placement of Paparella #2 Pressure Equalization Tube,  Myringoplasty  ;  Surgeon: Stephan Crowley MD;  [facility]    MASTOIDECTOMY Right 10/12/2017    Procedure: MASTOIDECTOMY;;  Surgeon: Stephan Crowley MD;  [facility]    MEATOPLASTY EAR Right 10/12/2017    Procedure: MEATOPLASTY EAR;;  Surgeon: Stephan Crowley MD;  [facility]    MEATOPLASTY EAR Right 5/9/2019    Procedure: Right Ear Meatoplasty;  Surgeon: Stephan Crowley MD;  [facility]    MYRINGOTOMY, INSERT TUBE BILATERAL, COMBINED Bilateral 5/14/2015    Procedure: COMBINED MYRINGOTOMY, INSERT TUBE BILATERAL;  Surgeon: Stephan Crowley MD;  [facility]    PE TUBES      SEPTOPLASTY, TURBINOPLASTY, COMBINED N/A 9/23/2019    Procedure: Endoscopic Septoplasty, Bilateral Inferior Turbinate Reduction;  Surgeon: Stephan Crowley MD;  [facility]    TONSILLECTOMY      TYMPANOPLASTY Left 5/14/2015    Procedure: TYMPANOPLASTY;  Surgeon: Stephan Crowley MD;  [facility]    TYMPANOPLASTY Right 10/12/2017    Procedure: TYMPANOPLASTY;  Right Endural Flexiable Tympanoplasty, Middle Ear Reconstruction,Mastoidectomy, Myringotomy and Tube (#2 Paparella tube), Meatoplasty ;  Surgeon: Stephan Crowley MD;  [facility]    TYMPANOPLASTY Right 5/9/2019    Procedure: Right Revision Tympanoplasty Flexible Approach, Middle Ear Reconstruction, Myringotomy and Tube;  Surgeon: Stephan Crowley MD;  [facility]       Family History:    No family history on file.    Social History:  Marital Status:  Single [1]  Social History     Tobacco Use    Smoking status: Never    Smokeless tobacco: Never   Substance Use Topics     Alcohol use: Yes     Comment: rare    Drug use: No        Medications:    cephALEXin (KEFLEX) 500 MG capsule  metoclopramide (REGLAN) 10 MG tablet  WEGOVY 0.5 MG/0.5ML pen  amoxicillin-clavulanate (AUGMENTIN) 875-125 MG tablet  BUSPIRONE HCL PO  cetirizine (ZYRTEC) 10 MG tablet  CLONAZEPAM PO  DiphenhydrAMINE HCl (BENADRYL PO)  ergocalciferol (ERGOCALCIFEROL) 42343 units capsule  etonogestrel-ethinyl estradiol (NUVARING) 0.12-0.015 MG/24HR vaginal ring  Ferrous Sulfate (IRON SUPPLEMENT PO)  hydrOXYzine (ATARAX) 25 MG tablet  ibuprofen (ADVIL/MOTRIN) 600 MG tablet  OLANZapine zydis (ZYPREXA) 5 MG ODT  omeprazole (PRILOSEC) 20 MG DR capsule  ondansetron (ZOFRAN ODT) 4 MG ODT tab  ondansetron (ZOFRAN-ODT) 4 MG ODT tab  propranolol (INDERAL) 10 MG tablet  sertraline (ZOLOFT) 100 MG tablet          Review of Systems   Constitutional:  Positive for appetite change. Negative for fatigue and fever.   Respiratory:  Negative for cough and shortness of breath.    Gastrointestinal:  Positive for abdominal pain, nausea and vomiting.   Genitourinary: Negative.    Skin: Negative.        Physical Exam   BP: (!) 184/104  Pulse: 75  Temp: 98.2  F (36.8  C)  Resp: 16  SpO2: 96 %      Physical Exam  Vitals and nursing note reviewed.   Constitutional:       General: She is not in acute distress.     Appearance: Normal appearance. She is not ill-appearing, toxic-appearing or diaphoretic.   Cardiovascular:      Rate and Rhythm: Normal rate and regular rhythm.   Pulmonary:      Effort: Pulmonary effort is normal.   Skin:     General: Skin is warm and dry.      Capillary Refill: Capillary refill takes less than 2 seconds.   Neurological:      General: No focal deficit present.      Mental Status: She is alert and oriented to person, place, and time.         ED Course     ED Course as of 09/05/24 2042   Thu Sep 05, 2024   1501 CRP Inflammation(!): 13.20   1840 We will trial clear liquids     Procedures              Critical Care time:   none               Results for orders placed or performed during the hospital encounter of 09/05/24 (from the past 24 hour(s))   UA with Microscopic reflex to Culture    Specimen: Urine, Clean Catch   Result Value Ref Range    Color Urine Yellow Colorless, Straw, Light Yellow, Yellow    Appearance Urine Slightly Cloudy (A) Clear    Glucose Urine Negative Negative mg/dL    Bilirubin Urine Negative Negative    Ketones Urine Trace (A) Negative mg/dL    Specific Gravity Urine 1.028 1.003 - 1.035    Blood Urine Large (A) Negative    pH Urine 5.5 4.7 - 8.0    Protein Albumin Urine 50 (A) Negative mg/dL    Urobilinogen Urine Normal Normal, 2.0 mg/dL    Nitrite Urine Negative Negative    Leukocyte Esterase Urine Moderate (A) Negative    Bacteria Urine Few (A) None Seen /HPF    Mucus Urine Present (A) None Seen /LPF    RBC Urine 81 (H) <=2 /HPF    WBC Urine 34 (H) <=5 /HPF    Squamous Epithelials Urine 2 (H) <=1 /HPF    Narrative    Urine Culture ordered based on laboratory criteria   HCG qualitative urine (UPT)   Result Value Ref Range    hCG Urine Qualitative Negative Negative   CBC with platelets differential    Narrative    The following orders were created for panel order CBC with platelets differential.  Procedure                               Abnormality         Status                     ---------                               -----------         ------                     CBC with platelets and d...[[id number]]                      Final result                 Please view results for these tests on the individual orders.   CRP inflammation   Result Value Ref Range    CRP Inflammation 13.20 (H) <5.00 mg/L   Comprehensive metabolic panel   Result Value Ref Range    Sodium 137 135 - 145 mmol/L    Potassium 3.5 3.4 - 5.3 mmol/L    Carbon Dioxide (CO2) 23 22 - 29 mmol/L    Anion Gap 15 7 - 15 mmol/L    Urea Nitrogen 12.8 6.0 - 20.0 mg/dL    Creatinine 0.71 0.51 - 0.95 mg/dL    GFR Estimate >90 >60 mL/min/1.73m2    Calcium  9.1 8.8 - 10.4 mg/dL    Chloride 99 98 - 107 mmol/L    Glucose 109 (H) 70 - 99 mg/dL    Alkaline Phosphatase 78 40 - 150 U/L    AST 22 0 - 45 U/L    ALT 16 0 - 50 U/L    Protein Total 8.1 6.4 - 8.3 g/dL    Albumin 4.4 3.5 - 5.2 g/dL    Bilirubin Total 0.4 <=1.2 mg/dL   CBC with platelets and differential   Result Value Ref Range    WBC Count 9.5 4.0 - 11.0 10e3/uL    RBC Count 4.49 3.80 - 5.20 10e6/uL    Hemoglobin 13.2 11.7 - 15.7 g/dL    Hematocrit 38.9 35.0 - 47.0 %    MCV 87 78 - 100 fL    MCH 29.4 26.5 - 33.0 pg    MCHC 33.9 31.5 - 36.5 g/dL    RDW 13.1 10.0 - 15.0 %    Platelet Count 242 150 - 450 10e3/uL    % Neutrophils 81 %    % Lymphocytes 13 %    % Monocytes 5 %    % Eosinophils 0 %    % Basophils 0 %    % Immature Granulocytes 1 %    NRBCs per 100 WBC 0 <1 /100    Absolute Neutrophils 7.7 1.6 - 8.3 10e3/uL    Absolute Lymphocytes 1.2 0.8 - 5.3 10e3/uL    Absolute Monocytes 0.5 0.0 - 1.3 10e3/uL    Absolute Eosinophils 0.0 0.0 - 0.7 10e3/uL    Absolute Basophils 0.0 0.0 - 0.2 10e3/uL    Absolute Immature Granulocytes 0.1 <=0.4 10e3/uL    Absolute NRBCs 0.0 10e3/uL   Troponin T, High Sensitivity   Result Value Ref Range    Troponin T, High Sensitivity <6 <=14 ng/L   Extra Tube    Narrative    The following orders were created for panel order Extra Tube.  Procedure                               Abnormality         Status                     ---------                               -----------         ------                     Extra Red Top Tube[[id number]]                               Final result                 Please view results for these tests on the individual orders.   Extra Red Top Tube   Result Value Ref Range    Hold Specimen [facility]    US Pelvic Complete with Transvaginal    Narrative    PROCEDURE: US PELVIC TRANSABDOMINAL AND TRANSVAGINAL    HISTORY: Intractable vomiting and abnormal CT scan; [facility] - 8/20/2024    TECHNIQUE: Transabdominal and transvaginal grayscale, color and power  Doppler ultrasound  of the pelvis.    COMPARISON: CT abdomen pelvis 9/4/2024    FINDINGS:     MEASUREMENTS:  Uterus: 10.5 x 4.1 x 6.8 cm  Endometrium: 1.3 cm in thickness  Right Ovary: 7.7 x 7.7 x 6.9 cm  Left Ovary:  9.2 x 8.1 x 7.1 cm    UTERUS: The uterus is normal in size and contour. The endometrium is  normal in thickness. There is no myometrial mass.    ADNEXA: Ovaries are enlarged. Bilateral ovarian masses, representative  left adnexal/ovarian mass measures 8.0 x 7.4 x 6.2 cm. There is normal  color-flow to both ovaries.  DOPPLER: Doppler evaluation shows normal venous and arterial flow to  both ovaries.    MISC: There is no free fluid in the pelvis.      Impression    IMPRESSION:    No acute abnormality.     Bilateral ovarian masses, suspicious for endometriomas. If not  previously characterized, recommend follow-up MR pelvis.    LO SANTIAGO MD         SYSTEM ID:  [id number]       Medications   sodium chloride 0.9% BOLUS 1,000 mL (0 mLs Intravenous Stopped 9/5/24 1548)   cefTRIAXone IN D5W (ROCEPHIN) intermittent infusion 2 g (0 g Intravenous Stopped 9/5/24 1518)   droPERidol (INAPSINE) injection 1.25 mg (1.25 mg Intravenous $Given 9/5/24 1446)   droPERidol (INAPSINE) injection 1.25 mg (1.25 mg Intravenous $Given 9/5/24 1718)       Assessments & Plan (with Medical Decision Making)   44-year-old female who returns to the emergency department for evaluation of persistent nausea and vomiting.  Abdominal pain has improved.  Yesterday for similar.  Mildly abnormal CT scan.  Sling changed to Wegovy dosage.  No fevers or chills.  Abnormality noted in the pelvis on CT scan.  She has not filled her Zofran.  Protracted observation and treatment emergency department today of nearly 8 hours with resolution of her symptoms after 2 doses of droperidol.  CRP is improving.  No leukocytosis.  Comprehensive panel is unremarkable.  She does have a urinary tract infection.  No evidence of acute pyelonephritis on previous CT scan.  Ultrasound of  the pelvis as above.  She was able to tolerate oral intake.  They requested discharge home.  Certainly reasonable.  Stressed importance of close clinic follow-up tomorrow.  Strict return precautions provided.  She does not appear to be suffering from a catastrophic event today.    This document was prepared using a combination of typing and voice generated software.  While every attempt was made for accuracy, spelling and grammatical errors may exist.     I have reviewed the nursing notes.    I have reviewed the findings, diagnosis, plan and need for follow up with the patient.           Medical Decision Making  The patient's presentation was of moderate complexity (an undiagnosed new problem with uncertain prognosis).    The patient's evaluation involved:  review of 3+ test result(s) ordered prior to this encounter (multiple recent labs and images)  ordering and/or review of 3+ test(s) in this encounter (multiple labs and ultrasound)    The patient's management necessitated moderate risk (prescription drug management including medications given in the ED).        New Prescriptions    CEPHALEXIN (KEFLEX) 500 MG CAPSULE    Take 1 capsule (500 mg) by mouth 3 times daily for 7 days.    METOCLOPRAMIDE (REGLAN) 10 MG TABLET    Take 1 tablet (10 mg) by mouth 3 times daily as needed.       Final diagnoses:   Intractable vomiting   UTI (urinary tract infection)       9/5/2024   [facility]       Ritu Xiao PA-C  09/05/24 2044

## 2024-09-05 NOTE — ED NOTES
Face to face report given with opportunity to observe patient.    Report given to NENA Silverman & Raissa FREEMAN RN.    Chas Pinedo RN   9/4/2024  7:15 PM

## 2024-09-05 NOTE — DISCHARGE INSTRUCTIONS
Return to the emergency department for worsening symptoms and concerning symptoms.  Follow-up with your primary care provider or the surgeon tomorrow.  Call to schedule appointment

## 2024-09-05 NOTE — ED NOTES
Patient presents with c/o RLQ abdo pain, nausea, and vomiting. Was seen in ED yesterday, denies any change in symptoms from yesterday. Pain has decreased some per patient.

## 2024-09-05 NOTE — ED TRIAGE NOTES
Pt presents with continues to be nauseated and vomiting.  Was here for similar complaints as yesterday. Unknown fever also has some abdominal pain on RLQ

## 2024-09-06 NOTE — ED NOTES
Face to face report given with opportunity to observe patient.    Report given to aaron díaz RN   9/5/2024  7:26 PM

## 2024-09-06 NOTE — ED NOTES
Patient's mother reports she tolerated the Jell-o and apple juice.   She is awaiting dispo decision.

## 2024-09-06 NOTE — DISCHARGE INSTRUCTIONS
Clear liquid diet for the next several days.    Please follow-up in the clinic tomorrow for recheck.    Continue Zofran routinely.    I have ordered Reglan to be used as needed.    Return here for any other questions or concerns.    You do of urinary tract infection.  You do not need to start your Keflex until tomorrow night.

## 2024-09-07 LAB — BACTERIA UR CULT: NORMAL

## 2024-09-09 ENCOUNTER — OFFICE VISIT (OUTPATIENT)
Dept: SURGERY | Facility: OTHER | Age: 44
End: 2024-09-09
Attending: STUDENT IN AN ORGANIZED HEALTH CARE EDUCATION/TRAINING PROGRAM
Payer: COMMERCIAL

## 2024-09-09 VITALS
WEIGHT: 263 LBS | RESPIRATION RATE: 18 BRPM | HEART RATE: 68 BPM | SYSTOLIC BLOOD PRESSURE: 120 MMHG | BODY MASS INDEX: 46.6 KG/M2 | HEIGHT: 63 IN | OXYGEN SATURATION: 98 % | DIASTOLIC BLOOD PRESSURE: 70 MMHG | TEMPERATURE: 99.6 F

## 2024-09-09 DIAGNOSIS — R10.9 ABDOMINAL PAIN OF UNKNOWN ETIOLOGY: Primary | ICD-10-CM

## 2024-09-09 PROBLEM — R10.31 ABDOMINAL PAIN, RIGHT LOWER QUADRANT: Status: ACTIVE | Noted: 2024-09-09

## 2024-09-09 LAB
ALBUMIN SERPL BCG-MCNC: 4 G/DL (ref 3.5–5.2)
ALP SERPL-CCNC: 74 U/L (ref 40–150)
ALT SERPL W P-5'-P-CCNC: 18 U/L (ref 0–50)
ANION GAP SERPL CALCULATED.3IONS-SCNC: 11 MMOL/L (ref 7–15)
AST SERPL W P-5'-P-CCNC: 20 U/L (ref 0–45)
BASOPHILS # BLD AUTO: 0 10E3/UL (ref 0–0.2)
BASOPHILS NFR BLD AUTO: 1 %
BILIRUB SERPL-MCNC: 0.3 MG/DL
BUN SERPL-MCNC: 9.6 MG/DL (ref 6–20)
CALCIUM SERPL-MCNC: 8.8 MG/DL (ref 8.8–10.4)
CHLORIDE SERPL-SCNC: 103 MMOL/L (ref 98–107)
CREAT SERPL-MCNC: 0.74 MG/DL (ref 0.51–0.95)
EGFRCR SERPLBLD CKD-EPI 2021: >90 ML/MIN/1.73M2
EOSINOPHIL # BLD AUTO: 0.1 10E3/UL (ref 0–0.7)
EOSINOPHIL NFR BLD AUTO: 2 %
ERYTHROCYTE [DISTWIDTH] IN BLOOD BY AUTOMATED COUNT: 13.3 % (ref 10–15)
GLUCOSE SERPL-MCNC: 84 MG/DL (ref 70–99)
HCO3 SERPL-SCNC: 26 MMOL/L (ref 22–29)
HCT VFR BLD AUTO: 39.1 % (ref 35–47)
HGB BLD-MCNC: 13.3 G/DL (ref 11.7–15.7)
IMM GRANULOCYTES # BLD: 0 10E3/UL
IMM GRANULOCYTES NFR BLD: 0 %
LYMPHOCYTES # BLD AUTO: 2 10E3/UL (ref 0.8–5.3)
LYMPHOCYTES NFR BLD AUTO: 26 %
MCH RBC QN AUTO: 30 PG (ref 26.5–33)
MCHC RBC AUTO-ENTMCNC: 34 G/DL (ref 31.5–36.5)
MCV RBC AUTO: 88 FL (ref 78–100)
MONOCYTES # BLD AUTO: 0.6 10E3/UL (ref 0–1.3)
MONOCYTES NFR BLD AUTO: 8 %
NEUTROPHILS # BLD AUTO: 4.9 10E3/UL (ref 1.6–8.3)
NEUTROPHILS NFR BLD AUTO: 64 %
NRBC # BLD AUTO: 0 10E3/UL
NRBC BLD AUTO-RTO: 0 /100
PLATELET # BLD AUTO: 215 10E3/UL (ref 150–450)
POTASSIUM SERPL-SCNC: 3.3 MMOL/L (ref 3.4–5.3)
PROT SERPL-MCNC: 7 G/DL (ref 6.4–8.3)
RBC # BLD AUTO: 4.44 10E6/UL (ref 3.8–5.2)
SODIUM SERPL-SCNC: 140 MMOL/L (ref 135–145)
WBC # BLD AUTO: 7.7 10E3/UL (ref 4–11)

## 2024-09-09 PROCEDURE — 99214 OFFICE O/P EST MOD 30 MIN: CPT | Performed by: NURSE PRACTITIONER

## 2024-09-09 PROCEDURE — 84450 TRANSFERASE (AST) (SGOT): CPT | Mod: ZL | Performed by: NURSE PRACTITIONER

## 2024-09-09 PROCEDURE — 85025 COMPLETE CBC W/AUTO DIFF WBC: CPT | Mod: ZL | Performed by: NURSE PRACTITIONER

## 2024-09-09 PROCEDURE — G0463 HOSPITAL OUTPT CLINIC VISIT: HCPCS

## 2024-09-09 PROCEDURE — 82040 ASSAY OF SERUM ALBUMIN: CPT | Mod: ZL | Performed by: NURSE PRACTITIONER

## 2024-09-09 PROCEDURE — 36415 COLL VENOUS BLD VENIPUNCTURE: CPT | Mod: ZL | Performed by: NURSE PRACTITIONER

## 2024-09-09 RX ORDER — MESALAMINE 1.2 G/1
4 TABLET, DELAYED RELEASE ORAL
COMMUNITY
Start: 2024-03-27 | End: 2024-09-09

## 2024-09-09 RX ORDER — BUSPIRONE HYDROCHLORIDE 15 MG/1
1 TABLET ORAL
COMMUNITY
Start: 2024-08-16

## 2024-09-09 RX ORDER — ONDANSETRON 8 MG/1
8 TABLET, FILM COATED ORAL EVERY 8 HOURS PRN
COMMUNITY
Start: 2024-08-21 | End: 2024-09-09

## 2024-09-09 RX ORDER — CLONAZEPAM 0.5 MG/1
1 TABLET ORAL
COMMUNITY
Start: 2024-07-18

## 2024-09-09 ASSESSMENT — PAIN SCALES - GENERAL: PAINLEVEL: SEVERE PAIN (6)

## 2024-09-09 NOTE — LETTER
September 9, 2024      Elisha Nunez  [address]  [facility] [id number]        To Whom It May Concern:    Elisha Nunez  was seen on September 9th.  Please excuse her for any missed hours due to clinic appointment.        Sincerely,        Geraldine Azevedo, NP

## 2024-09-09 NOTE — H&P (VIEW-ONLY)
CLINIC NOTE - CONSULT  9/9/2024    Patient:Elisha Nunez    Referring Physician: ER follow up    Reason for Referral: Abdominal Pain    This is a 44 year old female with 2 weeks of abdominal pain. The pain is described as RLQ in location.  The pain is unchanged since seen in the ER and patient is having to take pain medication for the pain.    Past Medical History:  Past Medical History:   Diagnosis Date    Anxiety     Depression     Elevated liver enzymes 9/2015    ultrasound showed enlarged liver.     Hearing loss     Noninfectious ileitis     Otitis media     PONV (postoperative nausea and vomiting)        Past Surgical History:  Past Surgical History:   Procedure Laterality Date    ANTROSTOMY NASAL Bilateral 9/23/2019    Procedure: Bilateral Maxillary Antrostomy;  Surgeon: Stephan Crowley MD;  [facility]    CANALPLASTY Right 9/17/2018    Procedure: CANALPLASTY;;  Surgeon: Stephan Crowley MD;  [facility]    ETHMOIDECTOMY Bilateral 9/23/2019    Procedure: Bilateral Anterior Ethmoidectomy;  Surgeon: Stephan Crowley MD;  [facility]    GRAFT THIERSCH STATUS POST TYMPANOMASTOIDECTOMY Right 10/23/2017    Procedure: GRAFT THIERSCH STATUS POST TYMPANOMASTOIDECTOMY;  Right Removal of Granulation Tissue, Removal of Packing and Sutures, Thiersch Graft;  Surgeon: Stephan Crowley MD;  [facility]    GRAFT THIERSCH STATUS POST TYMPANOMASTOIDECTOMY Right 9/17/2018    Procedure: GRAFT THIERSCH STATUS POST TYMPANOMASTOIDECTOMY;  Right Ear Removal Of Granulation Tissue,  Canalplasty,   Meatoplasty,   Right Thiersch Graft,  Removal of Right Ear Existing Pressure Equalization Tube,  Myringotomy with placement of Paparella #2 Pressure Equalization Tube,  Myringoplasty  ;  Surgeon: Stephan Crowley MD;  [facility]    MASTOIDECTOMY Right 10/12/2017    Procedure: MASTOIDECTOMY;;  Surgeon: Stephan Crowley MD;  [facility]    MEATOPLASTY EAR Right 10/12/2017    Procedure: MEATOPLASTY  EAR;;  Surgeon: Stephan Crowley MD;  [facility]    MEATOPLASTY EAR Right 5/9/2019    Procedure: Right Ear Meatoplasty;  Surgeon: Stephan Crowley MD;  [facility]    MYRINGOTOMY, INSERT TUBE BILATERAL, COMBINED Bilateral 5/14/2015    Procedure: COMBINED MYRINGOTOMY, INSERT TUBE BILATERAL;  Surgeon: Stephan Crowley MD;  [facility]    PE TUBES      SEPTOPLASTY, TURBINOPLASTY, COMBINED N/A 9/23/2019    Procedure: Endoscopic Septoplasty, Bilateral Inferior Turbinate Reduction;  Surgeon: Stephan Crowley MD;  [facility]    TONSILLECTOMY      TYMPANOPLASTY Left 5/14/2015    Procedure: TYMPANOPLASTY;  Surgeon: Stephan Crowley MD;  [facility]    TYMPANOPLASTY Right 10/12/2017    Procedure: TYMPANOPLASTY;  Right Endural Flexiable Tympanoplasty, Middle Ear Reconstruction,Mastoidectomy, Myringotomy and Tube (#2 Paparella tube), Meatoplasty ;  Surgeon: Stephan Crowley MD;  [facility]    TYMPANOPLASTY Right 5/9/2019    Procedure: Right Revision Tympanoplasty Flexible Approach, Middle Ear Reconstruction, Myringotomy and Tube;  Surgeon: Stephan Crowley MD;  [facility]       Family History History:  No family history on file.    History of Tobacco Use:  History   Smoking Status    Never   Smokeless Tobacco    Never       Current Medications:  Current Outpatient Medications   Medication Sig Dispense Refill    amoxicillin-clavulanate (AUGMENTIN) 875-125 MG tablet Take 1 tablet by mouth 2 times daily. 4 tablet 0    busPIRone (BUSPAR) 15 MG tablet Take 1 tablet by mouth 2 times daily.      cephALEXin (KEFLEX) 500 MG capsule Take 1 capsule (500 mg) by mouth 3 times daily for 7 days. 21 capsule 0    clonazePAM (KLONOPIN) 0.5 MG tablet Take 1 tablet by mouth 2 times daily.      etonogestrel-ethinyl estradiol (NUVARING) 0.12-0.015 MG/24HR vaginal ring Place 1 each vaginally every 28 days Place for 3 weeks and then remove for one week      hydrOXYzine (ATARAX) 25 MG tablet Take 25  "mg by mouth 3 times daily as needed for itching      metFORMIN (GLUCOPHAGE) 500 MG tablet Take 1 tablet by mouth daily at 2 pm.      metoclopramide (REGLAN) 10 MG tablet Take 1 tablet (10 mg) by mouth 3 times daily as needed. 15 tablet 0    OLANZapine zydis (ZYPREXA) 5 MG ODT Take 1 tablet (5 mg) by mouth every 8 hours as needed for other (nausea and vomiting). 20 tablet 0    sertraline (ZOLOFT) 100 MG tablet Take 200 mg by mouth      WEGOVY 0.5 MG/0.5ML pen Inject 0.5 mg subcutaneously.         Allergies:  Allergies   Allergen Reactions    Seasonal Allergies        PHYSICAL EXAM:     Vital signs: /70 (BP Location: Right arm, Cuff Size: Adult Large)   Pulse 68   Temp 99.6  F (37.6  C) (Tympanic)   Resp 18   Ht 1.6 m (5' 3\")   Wt 119.3 kg (263 lb)   LMP 09/02/2024 (Approximate)   SpO2 98%   BMI 46.59 kg/m     BMI: Body mass index is 46.59 kg/m .   General: Normal, healthy, cooperative, in no acute distress, alert   Skin: no rashes   Lungs: respirations are non-labored   Abdominal: Pain is noted to RLQ to palpation   Extremities: No cyanosis, clubbing or edema noted bilaterally in Upper and Lower Extremities   Neurological: without deficit    LABORATORY:  CBC RESULTS:   Recent Labs   Lab Test 09/09/24  1538   WBC 7.7   RBC 4.44   HGB 13.3   HCT 39.1   MCV 88   MCH 30.0   MCHC 34.0   RDW 13.3          Last Comprehensive Metabolic Panel:  Sodium   Date Value Ref Range Status   09/09/2024 140 135 - 145 mmol/L Final   06/28/2016 140 133 - 144 mmol/L Final     Potassium   Date Value Ref Range Status   09/09/2024 3.3 (L) 3.4 - 5.3 mmol/L Final   09/10/2018 4.5 3.5 - 5.1 mEq/L Final     Chloride   Date Value Ref Range Status   09/09/2024 103 98 - 107 mmol/L Final   06/28/2016 108 94 - 109 mmol/L Final     Carbon Dioxide   Date Value Ref Range Status   06/28/2016 22 20 - 32 mmol/L Final     Carbon Dioxide (CO2)   Date Value Ref Range Status   09/09/2024 26 22 - 29 mmol/L Final     Anion Gap   Date Value " Ref Range Status   09/09/2024 11 7 - 15 mmol/L Final   06/28/2016 10 3 - 14 mmol/L Final     Glucose   Date Value Ref Range Status   09/09/2024 84 70 - 99 mg/dL Final   09/10/2018 104 (H) 60 - 99 mg/dL Final     Urea Nitrogen   Date Value Ref Range Status   09/09/2024 9.6 6.0 - 20.0 mg/dL Final   06/28/2016 8 7 - 30 mg/dL Final     Creatinine   Date Value Ref Range Status   09/09/2024 0.74 0.51 - 0.95 mg/dL Final   09/10/2018 0.78 0.70 - 1.20 mg/dL Final     GFR Estimate   Date Value Ref Range Status   09/09/2024 >90 >60 mL/min/1.73m2 Final     Comment:     eGFR calculated using 2021 CKD-EPI equation.   09/10/2018 >60 >60 ml/min/1.73m2 Final     Calcium   Date Value Ref Range Status   09/09/2024 8.8 8.8 - 10.4 mg/dL Final     Comment:     Reference intervals for this test were updated on 7/16/2024 to reflect our healthy population more accurately. There may be differences in the flagging of prior results with similar values performed with this method. Those prior results can be interpreted in the context of the updated reference intervals.   06/28/2016 9.0 8.5 - 10.1 mg/dL Final     Bilirubin Total   Date Value Ref Range Status   09/09/2024 0.3 <=1.2 mg/dL Final   06/28/2016 0.3 0.2 - 1.3 mg/dL Final     Alkaline Phosphatase   Date Value Ref Range Status   09/09/2024 74 40 - 150 U/L Final   06/28/2016 94 40 - 150 U/L Final     ALT   Date Value Ref Range Status   09/09/2024 18 0 - 50 U/L Final   06/28/2016 49 0 - 50 U/L Final     AST   Date Value Ref Range Status   09/09/2024 20 0 - 45 U/L Final   06/28/2016 72 (H) 0 - 45 U/L Final       PRIOR PERTINENT RADIOLOGY STUDIES:   Exam: CT ABDOMEN PELVIS W CONTRAST     Exam reason: Persistent lower abdominal pain     Technique: Using helical CT technique, axial images of the abdomen and  pelvis were obtained with IV contrast.  Coronal and sagittal  reconstructions also performed. This CT was performed using one or  more of the following dose reduction techniques: automated  exposure  control, adjustment of the mA and/or kV according to patient size,  and/or use of iterative reconstruction technique.     Meds/Contrast: isovue 370 129mL     Comparison: 6/28/2018      FINDINGS:     ABDOMEN:     Liver: There is a subtle lobular contour of the liver. No focal mass.  Gallbladder: No calcified gallstones.   Bile Ducts: No biliary ductal dilation.   Spleen:  There is splenomegaly with the spleen measuring up to 16.5  cm.  Pancreas: No mass, ductal dilatation, or inflammatory changes.  Kidneys: No solid mass, hydronephrosis, or any definite calculi.   Adrenals:  No nodules.   Lymph Nodes: No adenopathy.   Vascular: No aortic aneurysm.   Abdominal Wall: No acute findings.      PELVIS:   The ovaries are enlarged with multiple cysts bilaterally. The left  ovary measures up to 8.5 cm and the right ovary measures up to 7.6 cm.        Bowel/Mesentery/Peritoneum:   -No bowel obstruction.   -The appendix is normal caliber. There is a small amount of fluid  adjacent to the appendix.  -Small volume free fluid in the pelvis.     Visualized portions of the Chest: No consolidation or pleural  effusion.  Musculoskeletal: No acute osseous abnormalities.                                                                       IMPRESSION:  The ovaries are enlarged with multiple cysts bilaterally. Pelvic  ultrasound could be considered for further evaluation.     Small volume free fluid in the pelvis and adjacent to the appendix,  likely physiologic. The appendix is normal caliber.     Splenomegaly.    ASSESSMENT:   44 year old female with abdominal pain.      PLAN:   Dr. Carlitos Box was consulted on the patient and examined.  Labs were checked on the patient.  Patient notified to increase potassium in her diet for low potassium noted to lab.  Patient to hold keflex and take augmentin for 2 days which was prescribed.  Patient to have a CT scan of her abdomen/pelvis tomorrow to reassess her symptoms and follow up  after CT to discuss results.

## 2024-09-10 ENCOUNTER — ANESTHESIA EVENT (OUTPATIENT)
Dept: SURGERY | Facility: HOSPITAL | Age: 44
End: 2024-09-10
Payer: COMMERCIAL

## 2024-09-10 ENCOUNTER — PREP FOR PROCEDURE (OUTPATIENT)
Dept: SURGERY | Facility: OTHER | Age: 44
End: 2024-09-10

## 2024-09-10 ENCOUNTER — HOSPITAL ENCOUNTER (OUTPATIENT)
Dept: CT IMAGING | Facility: HOSPITAL | Age: 44
Discharge: HOME | End: 2024-09-10
Attending: NURSE PRACTITIONER
Payer: COMMERCIAL

## 2024-09-10 ENCOUNTER — PREP FOR PROCEDURE (OUTPATIENT)
Dept: OBGYN | Facility: OTHER | Age: 44
End: 2024-09-10

## 2024-09-10 ENCOUNTER — OFFICE VISIT (OUTPATIENT)
Dept: SURGERY | Facility: OTHER | Age: 44
End: 2024-09-10
Attending: SURGERY
Payer: COMMERCIAL

## 2024-09-10 VITALS
WEIGHT: 263 LBS | SYSTOLIC BLOOD PRESSURE: 142 MMHG | HEIGHT: 63 IN | RESPIRATION RATE: 16 BRPM | BODY MASS INDEX: 46.6 KG/M2 | HEART RATE: 78 BPM | TEMPERATURE: 99.4 F | OXYGEN SATURATION: 98 % | DIASTOLIC BLOOD PRESSURE: 80 MMHG

## 2024-09-10 DIAGNOSIS — N83.8 OVARIAN MASS: ICD-10-CM

## 2024-09-10 DIAGNOSIS — R10.84 ABDOMINAL PAIN, GENERALIZED: Primary | ICD-10-CM

## 2024-09-10 DIAGNOSIS — R10.9 ABDOMINAL PAIN OF UNKNOWN ETIOLOGY: Primary | ICD-10-CM

## 2024-09-10 DIAGNOSIS — R10.9 ABDOMINAL PAIN OF UNKNOWN ETIOLOGY: ICD-10-CM

## 2024-09-10 DIAGNOSIS — R10.2 PELVIC PAIN IN FEMALE: Primary | ICD-10-CM

## 2024-09-10 DIAGNOSIS — K38.9: ICD-10-CM

## 2024-09-10 PROCEDURE — G0463 HOSPITAL OUTPT CLINIC VISIT: HCPCS

## 2024-09-10 PROCEDURE — 74177 CT ABD & PELVIS W/CONTRAST: CPT

## 2024-09-10 PROCEDURE — 99214 OFFICE O/P EST MOD 30 MIN: CPT | Performed by: SURGERY

## 2024-09-10 PROCEDURE — 250N000011 HC RX IP 250 OP 636: Performed by: RADIOLOGY

## 2024-09-10 RX ORDER — IOPAMIDOL 755 MG/ML
129 INJECTION, SOLUTION INTRAVASCULAR ONCE
Status: COMPLETED | OUTPATIENT
Start: 2024-09-10 | End: 2024-09-10

## 2024-09-10 RX ADMIN — IOPAMIDOL 129 ML: 755 INJECTION, SOLUTION INTRAVENOUS at 09:56

## 2024-09-10 ASSESSMENT — PAIN SCALES - GENERAL: PAINLEVEL: MODERATE PAIN (5)

## 2024-09-10 NOTE — PROGRESS NOTES
CLINIC NOTE - FOLLOW UP  9/10/2024    Patient:Elisha Nunez    Reason for Visit: Follow up from prior clinic visit for abdominal pain    This is a 44 year old female here for follow up from a prior clinic visit for abdominal pain. Her prior medical records were reviewed.       The patient was in to see us yesterday.  A CAT scan was arranged for today.  She is following up after the CAT scan.  She does state that her pain got so bad last night that she did take narcotics.  No fever.  No chills.    Current Medications:  Current Outpatient Medications   Medication Sig Dispense Refill    amoxicillin-clavulanate (AUGMENTIN) 875-125 MG tablet Take 1 tablet by mouth 2 times daily. 4 tablet 0    busPIRone (BUSPAR) 15 MG tablet Take 1 tablet by mouth 2 times daily.      cephALEXin (KEFLEX) 500 MG capsule Take 1 capsule (500 mg) by mouth 3 times daily for 7 days. 21 capsule 0    clonazePAM (KLONOPIN) 0.5 MG tablet Take 1 tablet by mouth 2 times daily.      etonogestrel-ethinyl estradiol (NUVARING) 0.12-0.015 MG/24HR vaginal ring Place 1 each vaginally every 28 days Place for 3 weeks and then remove for one week      hydrOXYzine (ATARAX) 25 MG tablet Take 25 mg by mouth 3 times daily as needed for itching      metFORMIN (GLUCOPHAGE) 500 MG tablet Take 1 tablet by mouth daily at 2 pm.      metoclopramide (REGLAN) 10 MG tablet Take 1 tablet (10 mg) by mouth 3 times daily as needed. 15 tablet 0    OLANZapine zydis (ZYPREXA) 5 MG ODT Take 1 tablet (5 mg) by mouth every 8 hours as needed for other (nausea and vomiting). 20 tablet 0    sertraline (ZOLOFT) 100 MG tablet Take 200 mg by mouth      WEGOVY 0.5 MG/0.5ML pen Inject 0.5 mg subcutaneously.       Social History     Tobacco Use    Smoking status: Never    Smokeless tobacco: Never   Vaping Use    Vaping status: Never Used   Substance Use Topics    Alcohol use: Yes     Comment: rare    Drug use: No      Past Medical History:   Diagnosis Date    Anxiety     Depression      Elevated liver enzymes 9/2015    ultrasound showed enlarged liver.     Hearing loss     Noninfectious ileitis     Otitis media     PONV (postoperative nausea and vomiting)       Past Surgical History:   Procedure Laterality Date    ANTROSTOMY NASAL Bilateral 9/23/2019    Procedure: Bilateral Maxillary Antrostomy;  Surgeon: Stephan Crowley MD;  [facility]    CANALPLASTY Right 9/17/2018    Procedure: CANALPLASTY;;  Surgeon: Stephan Crowley MD;  [facility]    ETHMOIDECTOMY Bilateral 9/23/2019    Procedure: Bilateral Anterior Ethmoidectomy;  Surgeon: Stephan Crowley MD;  [facility]    GRAFT THIERSCH STATUS POST TYMPANOMASTOIDECTOMY Right 10/23/2017    Procedure: GRAFT THIERSCH STATUS POST TYMPANOMASTOIDECTOMY;  Right Removal of Granulation Tissue, Removal of Packing and Sutures, Thiersch Graft;  Surgeon: Stephan Crowley MD;  [facility]    GRAFT THIERSCH STATUS POST TYMPANOMASTOIDECTOMY Right 9/17/2018    Procedure: GRAFT THIERSCH STATUS POST TYMPANOMASTOIDECTOMY;  Right Ear Removal Of Granulation Tissue,  Canalplasty,   Meatoplasty,   Right Thiersch Graft,  Removal of Right Ear Existing Pressure Equalization Tube,  Myringotomy with placement of Paparella #2 Pressure Equalization Tube,  Myringoplasty  ;  Surgeon: Stephan Crowley MD;  [facility]    MASTOIDECTOMY Right 10/12/2017    Procedure: MASTOIDECTOMY;;  Surgeon: Stephan Crowley MD;  [facility]    MEATOPLASTY EAR Right 10/12/2017    Procedure: MEATOPLASTY EAR;;  Surgeon: Stephan Crowley MD;  [facility]    MEATOPLASTY EAR Right 5/9/2019    Procedure: Right Ear Meatoplasty;  Surgeon: Stephan Crowley MD;  [facility]    MYRINGOTOMY, INSERT TUBE BILATERAL, COMBINED Bilateral 5/14/2015    Procedure: COMBINED MYRINGOTOMY, INSERT TUBE BILATERAL;  Surgeon: Stephan Crowley MD;  [facility]    PE TUBES      SEPTOPLASTY, TURBINOPLASTY, COMBINED N/A 9/23/2019    Procedure: Endoscopic Septoplasty,  Bilateral Inferior Turbinate Reduction;  Surgeon: Stephan Crowley MD;  [facility]    TONSILLECTOMY      TYMPANOPLASTY Left 5/14/2015    Procedure: TYMPANOPLASTY;  Surgeon: Stepahn Crowley MD;  [facility]    TYMPANOPLASTY Right 10/12/2017    Procedure: TYMPANOPLASTY;  Right Endural Flexiable Tympanoplasty, Middle Ear Reconstruction,Mastoidectomy, Myringotomy and Tube (#2 Paparella tube), Meatoplasty ;  Surgeon: Stephan Crowley MD;  [facility]    TYMPANOPLASTY Right 5/9/2019    Procedure: Right Revision Tympanoplasty Flexible Approach, Middle Ear Reconstruction, Myringotomy and Tube;  Surgeon: Stephan Crowley MD;  [facility]      Allergies:  Allergies   Allergen Reactions    Seasonal Allergies      LABORATORY:    Recent Labs   Lab Test 09/09/24 1538 09/05/24 1424 09/04/24 1717   WBC 7.7 9.5 7.9   RBC 4.44 4.49 4.57   HGB 13.3 13.2 13.5   HCT 39.1 38.9 39.8   MCV 88 87 87   MCH 30.0 29.4 29.5   MCHC 34.0 33.9 33.9   RDW 13.3 13.1 13.1    242 248   ,   Recent Labs   Lab Test 09/09/24  1538 09/05/24  1424 09/04/24  1717 09/10/18  0000 06/28/16  1910    137 139  --  140   CHLORIDE 103 99 100  --  108   CO2 26 23 24  --  22   ANIONGAP 11 15 15  --  10   GLC 84 109* 125*   < > 75   BUN 9.6 12.8 14.7  --  8   CR 0.74 0.71 0.69   < > 0.59   GFRESTIMATED >90 >90 >90   < > >90  Non  GFR Calc     GFRESTBLACK  --   --   --   --  >90  African American GFR Calc     SARINA 8.8 9.1 9.5  --  9.0   BILITOTAL 0.3 0.4 0.3  --  0.3   ALKPHOS 74 78 85  --  94   ALT 18 16 13  --  49   AST 20 22 18  --  72*    < > = values in this interval not displayed.        RADIOLOGY:    Results for orders placed during the hospital encounter of 09/10/24    CT Abdomen Pelvis w Contrast    Narrative  EXAMINATION: CT ABDOMEN PELVIS W CONTRAST, 9/10/2024 10:05 AM    TECHNIQUE:  Helical CT images from the lung bases through the  symphysis pubis were obtained  with IV contrast. Contrast dose:  "ISOVUE  370  129mL    COMPARISON: none    HISTORY: Abdominal pain of unknown etiology    FINDINGS:    There is dependent atelectasis at the lung bases.    The liver is free of masses or biliary ductal enlargement. No  calcified gallstones are seen.    The the spleen and pancreas appear normal.    The adrenal glands are normal.    The right and left kidneys are free of masses or hydronephrosis.    The periaortic lymph nodes are normal in caliber.      The appendix is mildly thickened measuring 7.6 mm in maximal  thickness. There is a small amount of increased density in the  periappendiceal fat.    There are large bilateral ovarian masses bilaterally. The uterus  appears normal.    In the pelvis the bladder and rectum appear normal.    The regional skeleton is intact    Impression  IMPRESSION: Mildly thickened appendix with questionable adjacent  inflammatory changes.    Bilateral ovarian masses.    HELADIO FLOREZ MD      SYSTEM ID:  [id number]    I did review the CT personally and with the radiologist and agree with the findings.    ROS:  Pertinent items are noted in HPI.  All other systems are negative.    PHYSICAL EXAM:     Vital signs: BP (!) 142/80 (BP Location: Left arm, Cuff Size: Adult Large)   Pulse 78   Temp 99.4  F (37.4  C) (Tympanic)   Resp 16   Ht 1.6 m (5' 3\")   Wt 119.3 kg (263 lb)   LMP 09/02/2024 (Approximate)   SpO2 98%   BMI 46.59 kg/m     Weight: [unfilled]   BMI: Body mass index is 46.59 kg/m .   General: Normal, healthy, cooperative, in no acute distress, alert   Skin: no jaundice   HEENT: PERRLA and EOMI   Neck: supple   Lungs: clear to auscultation   CV: Regular rate and rhythm without murmer   Abdominal: Mild tenderness throughout with increased tenderness in the right lower quadrant.  No peritoneal signs.   Extremities: No cyanosis, clubbing or edema noted bilaterally in Upper and Lower Extremities   Neurological: without deficit    ASSESSMENT:  44 year old female here as follow up " from a prior clinic visit for abdominal pain.  CT is somewhat worrisome for appendix abnormality.  CT also shows ovarian abnormalities.    PLAN: I did discuss with her that I think at this point we should go ahead and remove her appendix because of the CT and removed from the equation as a cause for abdominal pain.  I did discuss the case with Doctor Jean Stringer of OB/GYN and he visited with the patient.  Because of the abnormality seen on CT we will do a joint procedure and he will evaluate and treat her ovarian mass this is indicated.    The risks, benefits, and alternatives to the planned procedure were fully discussed with the patient and/or the patient's representative(s). The risks of bleeding, infection, death, missing pathology, the need for additional procedures intra-operatively, the possible need for intra-operative consults, the possible need for transfusion therapy, cardiopulmonary compromise, the possible need for additional surgery for a complication were discussed with the patient and/or the patient's representative(s). The patient's and/or patient's representative(s) questions were addressed and answered. Informed consent was obtained from the patient and/or the patient's representative(s). The patient and/or the patient's representative(s) consent to proceed.

## 2024-09-10 NOTE — ANESTHESIA PREPROCEDURE EVALUATION
Anesthesia Pre-Procedure Evaluation    Patient: Elisha Nunez   MRN: [id number] : 1980        Procedure : Procedure(s):  APPENDECTOMY, LAPAROSCOPIC  Exploratory Laparoscopy with Indicated Procedures  Possible Oophorectomy          Past Medical History:   Diagnosis Date    Anxiety     Depression     Elevated liver enzymes 2015    ultrasound showed enlarged liver.     Hearing loss     Noninfectious ileitis     Otitis media     PONV (postoperative nausea and vomiting)       Past Surgical History:   Procedure Laterality Date    ANTROSTOMY NASAL Bilateral 2019    Procedure: Bilateral Maxillary Antrostomy;  Surgeon: Stephan Crowley MD;  [facility]    CANALPLASTY Right 2018    Procedure: CANALPLASTY;;  Surgeon: Stephan Crowley MD;  [facility]    ETHMOIDECTOMY Bilateral 2019    Procedure: Bilateral Anterior Ethmoidectomy;  Surgeon: Stephan Crowley MD;  [facility]    GRAFT THIERSCH STATUS POST TYMPANOMASTOIDECTOMY Right 10/23/2017    Procedure: GRAFT THIERSCH STATUS POST TYMPANOMASTOIDECTOMY;  Right Removal of Granulation Tissue, Removal of Packing and Sutures, Thiersch Graft;  Surgeon: Stephan Crowley MD;  [facility]    GRAFT THIERSCH STATUS POST TYMPANOMASTOIDECTOMY Right 2018    Procedure: GRAFT THIERSCH STATUS POST TYMPANOMASTOIDECTOMY;  Right Ear Removal Of Granulation Tissue,  Canalplasty,   Meatoplasty,   Right Thiersch Graft,  Removal of Right Ear Existing Pressure Equalization Tube,  Myringotomy with placement of Paparella #2 Pressure Equalization Tube,  Myringoplasty  ;  Surgeon: Stephan Crowley MD;  [facility]    MASTOIDECTOMY Right 10/12/2017    Procedure: MASTOIDECTOMY;;  Surgeon: Stephan Crowley MD;  [facility]    MEATOPLASTY EAR Right 10/12/2017    Procedure: MEATOPLASTY EAR;;  Surgeon: Stephan Crowley MD;  [facility]    MEATOPLASTY EAR Right 2019    Procedure: Right Ear Meatoplasty;  Surgeon: Stephan Crowley,  MD;  [facility]    MYRINGOTOMY, INSERT TUBE BILATERAL, COMBINED Bilateral 5/14/2015    Procedure: COMBINED MYRINGOTOMY, INSERT TUBE BILATERAL;  Surgeon: Stephan Crowley MD;  [facility]    PE TUBES      SEPTOPLASTY, TURBINOPLASTY, COMBINED N/A 9/23/2019    Procedure: Endoscopic Septoplasty, Bilateral Inferior Turbinate Reduction;  Surgeon: Stephan Crowley MD;  [facility]    TONSILLECTOMY      TYMPANOPLASTY Left 5/14/2015    Procedure: TYMPANOPLASTY;  Surgeon: Stephan Crowley MD;  [facility]    TYMPANOPLASTY Right 10/12/2017    Procedure: TYMPANOPLASTY;  Right Endural Flexiable Tympanoplasty, Middle Ear Reconstruction,Mastoidectomy, Myringotomy and Tube (#2 Paparella tube), Meatoplasty ;  Surgeon: Stephan Crowley MD;  [facility]    TYMPANOPLASTY Right 5/9/2019    Procedure: Right Revision Tympanoplasty Flexible Approach, Middle Ear Reconstruction, Myringotomy and Tube;  Surgeon: Stephan Crowley MD;  [facility]      Allergies   Allergen Reactions    Seasonal Allergies       Social History     Tobacco Use    Smoking status: Never    Smokeless tobacco: Never   Substance Use Topics    Alcohol use: Yes     Comment: rare      Wt Readings from Last 1 Encounters:   09/10/24 119.3 kg (263 lb)        Anesthesia Evaluation   Pt has had prior anesthetic.     History of anesthetic complications  - PONV.      ROS/MED HX  ENT/Pulmonary:     (+)     DARIUSZ risk factors,   obese,                                Neurologic:  - neg neurologic ROS     Cardiovascular:     (+)  - -   -  - -                                 Previous cardiac testing   Echo: Date: Results:    Stress Test:  Date: Results:    ECG Reviewed:  Date: 9/4/24 Results:  Sinus rhythm   T wave abnormality, consider anterior ischemia   Abnormal ECG   No previous ECGs available     Cath:  Date: Results:      METS/Exercise Tolerance: 3 - Able to walk 1-2 blocks without stopping    Hematologic:  - neg hematologic  ROS    "  Musculoskeletal:  - neg musculoskeletal ROS     GI/Hepatic: Comment: Elevated liver enzymes 9/2015     ultrasound showed enlarged liver.         (+)             liver disease,       Renal/Genitourinary:  - neg Renal ROS     Endo:     (+)               Obesity,       Psychiatric/Substance Use:     (+) psychiatric history anxiety and depression       Infectious Disease:  - neg infectious disease ROS     Malignancy:  - neg malignancy ROS     Other:  - neg other ROS          Physical Exam    Airway        Mallampati: II   TM distance: > 3 FB   Neck ROM: limited   Mouth opening: > 3 cm    Respiratory Devices and Support         Dental       (+) Minor Abnormalities - some fillings, tiny chips      Cardiovascular   cardiovascular exam normal       Rhythm and rate: regular and normal     Pulmonary   pulmonary exam normal        breath sounds clear to auscultation           OUTSIDE LABS:  CBC:   Lab Results   Component Value Date    WBC 7.7 09/09/2024    WBC 9.5 09/05/2024    HGB 13.3 09/09/2024    HGB 13.2 09/05/2024    HCT 39.1 09/09/2024    HCT 38.9 09/05/2024     09/09/2024     09/05/2024     BMP:   Lab Results   Component Value Date     09/09/2024     09/05/2024    POTASSIUM 3.3 (L) 09/09/2024    POTASSIUM 3.5 09/05/2024    CHLORIDE 103 09/09/2024    CHLORIDE 99 09/05/2024    CO2 26 09/09/2024    CO2 23 09/05/2024    BUN 9.6 09/09/2024    BUN 12.8 09/05/2024    CR 0.74 09/09/2024    CR 0.71 09/05/2024    GLC 84 09/09/2024     (H) 09/05/2024     COAGS: No results found for: \"PTT\", \"INR\", \"FIBR\"  POC:   Lab Results   Component Value Date     (H) 09/23/2019    HCG Negative 09/05/2024     HEPATIC:   Lab Results   Component Value Date    ALBUMIN 4.0 09/09/2024    PROTTOTAL 7.0 09/09/2024    ALT 18 09/09/2024    AST 20 09/09/2024    ALKPHOS 74 09/09/2024    BILITOTAL 0.3 09/09/2024     OTHER:   Lab Results   Component Value Date    A1C 5.4 09/10/2018    SARINA 8.8 09/09/2024    LIPASE 21 " "09/04/2024       Anesthesia Plan    ASA Status:  3    NPO Status:  NPO Appropriate    Anesthesia Type: General.     - Airway: ETT   Induction: Intravenous, Propofol.   Maintenance: TIVA.        Consents    Anesthesia Plan(s) and associated risks, benefits, and realistic alternatives discussed. Questions answered and patient/representative(s) expressed understanding.     - Discussed: Risks, Benefits and Alternatives for BOTH SEDATION and the PROCEDURE were discussed     - Discussed with:  Patient      - Extended Intubation/Ventilatory Support Discussed: No.      - Patient is DNR/DNI Status: No     Use of blood products discussed: No .     Postoperative Care    Pain management: Multi-modal analgesia, IV analgesics.   PONV prophylaxis: Ondansetron (or other 5HT-3), Dexamethasone or Solumedrol, Scopolamine patch, Background Propofol Infusion     Comments:    Other Comments: Box HP 9/9/24 included heart and lungs    K=3.3 today    On Wegovy->last dose 9/3/24    On augmentin and keflex for appendix and UTI.      Box to go first in case.     Discussed risks and benefits with patient for general anesthesia including sore throat, nausea, vomiting, aspiration, dental damage, loss of airway, CV complications, stroke, MI, death. Pt wishes to proceed.            MARKUS Goldstein CRNA    I have reviewed the pertinent notes and labs in the chart from the past 30 days and (re)examined the patient.  Any updates or changes from those notes are reflected in this note.    # Hypokalemia: Lowest K = 3.3 mmol/L in last 2 days, will replace as needed           # Severe Obesity: Estimated body mass index is 46.59 kg/m  as calculated from the following:    Height as of 9/10/24: 1.6 m (5' 3\").    Weight as of 9/10/24: 119.3 kg (263 lb).      "

## 2024-09-11 ENCOUNTER — HOSPITAL ENCOUNTER (OUTPATIENT)
Facility: HOSPITAL | Age: 44
Discharge: HOME | End: 2024-09-11
Attending: SURGERY | Admitting: SURGERY
Payer: COMMERCIAL

## 2024-09-11 ENCOUNTER — LAB (OUTPATIENT)
Dept: LAB | Facility: HOSPITAL | Age: 44
End: 2024-09-11
Attending: SURGERY
Payer: COMMERCIAL

## 2024-09-11 ENCOUNTER — ANESTHESIA (OUTPATIENT)
Dept: SURGERY | Facility: HOSPITAL | Age: 44
End: 2024-09-11
Payer: COMMERCIAL

## 2024-09-11 VITALS
TEMPERATURE: 98 F | BODY MASS INDEX: 48.21 KG/M2 | RESPIRATION RATE: 16 BRPM | HEIGHT: 62 IN | DIASTOLIC BLOOD PRESSURE: 85 MMHG | HEART RATE: 72 BPM | OXYGEN SATURATION: 95 % | WEIGHT: 262 LBS | SYSTOLIC BLOOD PRESSURE: 156 MMHG

## 2024-09-11 DIAGNOSIS — R10.31 ABDOMINAL PAIN, RIGHT LOWER QUADRANT: Primary | ICD-10-CM

## 2024-09-11 DIAGNOSIS — R10.84 ABDOMINAL PAIN, GENERALIZED: ICD-10-CM

## 2024-09-11 DIAGNOSIS — N83.8 OVARIAN MASS: ICD-10-CM

## 2024-09-11 DIAGNOSIS — R10.2 PELVIC PAIN: ICD-10-CM

## 2024-09-11 DIAGNOSIS — Z01.818 PRE-OP EXAM: Primary | ICD-10-CM

## 2024-09-11 LAB
ABO/RH(D): NORMAL
ANTIBODY SCREEN: NEGATIVE
FASTING STATUS PATIENT QL REPORTED: YES
GLUCOSE SERPL-MCNC: 111 MG/DL (ref 70–99)
HCG SERPL QL: NEGATIVE
HOLD SPECIMEN: NORMAL
HOLD SPECIMEN: NORMAL
POTASSIUM SERPL-SCNC: 3.3 MMOL/L (ref 3.4–5.3)
SPECIMEN EXPIRATION DATE: NORMAL

## 2024-09-11 PROCEDURE — 250N000013 HC RX MED GY IP 250 OP 250 PS 637: Performed by: SURGERY

## 2024-09-11 PROCEDURE — 250N000011 HC RX IP 250 OP 636: Performed by: NURSE ANESTHETIST, CERTIFIED REGISTERED

## 2024-09-11 PROCEDURE — 88305 TISSUE EXAM BY PATHOLOGIST: CPT | Mod: 26 | Performed by: PATHOLOGY

## 2024-09-11 PROCEDURE — 250N000009 HC RX 250

## 2024-09-11 PROCEDURE — 258N000003 HC RX IP 258 OP 636: Performed by: NURSE ANESTHETIST, CERTIFIED REGISTERED

## 2024-09-11 PROCEDURE — 44970 LAPAROSCOPY APPENDECTOMY: CPT | Performed by: SURGERY

## 2024-09-11 PROCEDURE — 84703 CHORIONIC GONADOTROPIN ASSAY: CPT

## 2024-09-11 PROCEDURE — 370N000017 HC ANESTHESIA TECHNICAL FEE, PER MIN: Performed by: SURGERY

## 2024-09-11 PROCEDURE — 82947 ASSAY GLUCOSE BLOOD QUANT: CPT | Performed by: SURGERY

## 2024-09-11 PROCEDURE — 49321 LAPAROSCOPY BIOPSY: CPT | Performed by: OBSTETRICS & GYNECOLOGY

## 2024-09-11 PROCEDURE — 250N000009 HC RX 250: Performed by: NURSE ANESTHETIST, CERTIFIED REGISTERED

## 2024-09-11 PROCEDURE — 250N000012 HC RX MED GY IP 250 OP 636 PS 637: Performed by: NURSE ANESTHETIST, CERTIFIED REGISTERED

## 2024-09-11 PROCEDURE — 88305 TISSUE EXAM BY PATHOLOGIST: CPT | Mod: TC | Performed by: SURGERY

## 2024-09-11 PROCEDURE — 999N000141 HC STATISTIC PRE-PROCEDURE NURSING ASSESSMENT: Performed by: SURGERY

## 2024-09-11 PROCEDURE — 86900 BLOOD TYPING SEROLOGIC ABO: CPT | Performed by: OBSTETRICS & GYNECOLOGY

## 2024-09-11 PROCEDURE — 250N000011 HC RX IP 250 OP 636: Performed by: SURGERY

## 2024-09-11 PROCEDURE — 44970 LAPAROSCOPY APPENDECTOMY: CPT | Performed by: NURSE ANESTHETIST, CERTIFIED REGISTERED

## 2024-09-11 PROCEDURE — 272N000001 HC OR GENERAL SUPPLY STERILE: Performed by: SURGERY

## 2024-09-11 PROCEDURE — 88342 IMHCHEM/IMCYTCHM 1ST ANTB: CPT | Mod: 26 | Performed by: PATHOLOGY

## 2024-09-11 PROCEDURE — 84132 ASSAY OF SERUM POTASSIUM: CPT | Performed by: NURSE ANESTHETIST, CERTIFIED REGISTERED

## 2024-09-11 PROCEDURE — 36415 COLL VENOUS BLD VENIPUNCTURE: CPT

## 2024-09-11 PROCEDURE — 360N000076 HC SURGERY LEVEL 3, PER MIN: Performed by: SURGERY

## 2024-09-11 PROCEDURE — 710N000012 HC RECOVERY PHASE 2, PER MINUTE: Performed by: SURGERY

## 2024-09-11 PROCEDURE — 88341 IMHCHEM/IMCYTCHM EA ADD ANTB: CPT | Mod: 26 | Performed by: PATHOLOGY

## 2024-09-11 PROCEDURE — 88108 CYTOPATH CONCENTRATE TECH: CPT | Mod: 26 | Performed by: PATHOLOGY

## 2024-09-11 PROCEDURE — 250N000011 HC RX IP 250 OP 636

## 2024-09-11 PROCEDURE — 710N000010 HC RECOVERY PHASE 1, LEVEL 2, PER MIN: Performed by: SURGERY

## 2024-09-11 PROCEDURE — 250N000025 HC SEVOFLURANE, PER MIN: Performed by: SURGERY

## 2024-09-11 PROCEDURE — 88108 CYTOPATH CONCENTRATE TECH: CPT | Mod: TC | Performed by: SURGERY

## 2024-09-11 PROCEDURE — 88304 TISSUE EXAM BY PATHOLOGIST: CPT | Mod: 26 | Performed by: PATHOLOGY

## 2024-09-11 PROCEDURE — 88360 TUMOR IMMUNOHISTOCHEM/MANUAL: CPT | Mod: 26 | Performed by: PATHOLOGY

## 2024-09-11 PROCEDURE — 36415 COLL VENOUS BLD VENIPUNCTURE: CPT | Performed by: NURSE ANESTHETIST, CERTIFIED REGISTERED

## 2024-09-11 RX ORDER — SODIUM CHLORIDE, SODIUM LACTATE, POTASSIUM CHLORIDE, CALCIUM CHLORIDE 600; 310; 30; 20 MG/100ML; MG/100ML; MG/100ML; MG/100ML
INJECTION, SOLUTION INTRAVENOUS CONTINUOUS
Status: DISCONTINUED | OUTPATIENT
Start: 2024-09-11 | End: 2024-09-11 | Stop reason: HOSPADM

## 2024-09-11 RX ORDER — ALBUTEROL SULFATE 0.83 MG/ML
2.5 SOLUTION RESPIRATORY (INHALATION) EVERY 4 HOURS PRN
Status: DISCONTINUED | OUTPATIENT
Start: 2024-09-11 | End: 2024-09-11 | Stop reason: HOSPADM

## 2024-09-11 RX ORDER — CEFAZOLIN SODIUM/WATER 2 G/20 ML
2 SYRINGE (ML) INTRAVENOUS
Status: COMPLETED | OUTPATIENT
Start: 2024-09-11 | End: 2024-09-11

## 2024-09-11 RX ORDER — HYDROCODONE BITARTRATE AND ACETAMINOPHEN 5; 325 MG/1; MG/1
1 TABLET ORAL EVERY 6 HOURS PRN
Qty: 18 TABLET | Refills: 0 | Status: SHIPPED | OUTPATIENT
Start: 2024-09-11 | End: 2024-09-14

## 2024-09-11 RX ORDER — DEXAMETHASONE SODIUM PHOSPHATE 4 MG/ML
INJECTION, SOLUTION INTRA-ARTICULAR; INTRALESIONAL; INTRAMUSCULAR; INTRAVENOUS; SOFT TISSUE PRN
Status: DISCONTINUED | OUTPATIENT
Start: 2024-09-11 | End: 2024-09-11

## 2024-09-11 RX ORDER — ONDANSETRON 2 MG/ML
4 INJECTION INTRAMUSCULAR; INTRAVENOUS EVERY 30 MIN PRN
Status: DISCONTINUED | OUTPATIENT
Start: 2024-09-11 | End: 2024-09-11 | Stop reason: HOSPADM

## 2024-09-11 RX ORDER — BUPIVACAINE HYDROCHLORIDE 5 MG/ML
INJECTION, SOLUTION EPIDURAL; INTRACAUDAL
Status: DISCONTINUED
Start: 2024-09-11 | End: 2024-09-11 | Stop reason: HOSPADM

## 2024-09-11 RX ORDER — FENTANYL CITRATE 50 UG/ML
25 INJECTION, SOLUTION INTRAMUSCULAR; INTRAVENOUS EVERY 5 MIN PRN
Status: DISCONTINUED | OUTPATIENT
Start: 2024-09-11 | End: 2024-09-11 | Stop reason: HOSPADM

## 2024-09-11 RX ORDER — FENTANYL CITRATE 50 UG/ML
INJECTION, SOLUTION INTRAMUSCULAR; INTRAVENOUS PRN
Status: DISCONTINUED | OUTPATIENT
Start: 2024-09-11 | End: 2024-09-11

## 2024-09-11 RX ORDER — NALOXONE HYDROCHLORIDE 0.4 MG/ML
0.1 INJECTION, SOLUTION INTRAMUSCULAR; INTRAVENOUS; SUBCUTANEOUS
Status: DISCONTINUED | OUTPATIENT
Start: 2024-09-11 | End: 2024-09-11 | Stop reason: HOSPADM

## 2024-09-11 RX ORDER — PROPOFOL 10 MG/ML
INJECTION, EMULSION INTRAVENOUS PRN
Status: DISCONTINUED | OUTPATIENT
Start: 2024-09-11 | End: 2024-09-11

## 2024-09-11 RX ORDER — ONDANSETRON 2 MG/ML
INJECTION INTRAMUSCULAR; INTRAVENOUS PRN
Status: DISCONTINUED | OUTPATIENT
Start: 2024-09-11 | End: 2024-09-11

## 2024-09-11 RX ORDER — METRONIDAZOLE 500 MG/100ML
500 INJECTION, SOLUTION INTRAVENOUS
Status: COMPLETED | OUTPATIENT
Start: 2024-09-11 | End: 2024-09-11

## 2024-09-11 RX ORDER — PROPOFOL 10 MG/ML
INJECTION, EMULSION INTRAVENOUS CONTINUOUS PRN
Status: DISCONTINUED | OUTPATIENT
Start: 2024-09-11 | End: 2024-09-11

## 2024-09-11 RX ORDER — HYDROMORPHONE HYDROCHLORIDE 1 MG/ML
0.2 INJECTION, SOLUTION INTRAMUSCULAR; INTRAVENOUS; SUBCUTANEOUS EVERY 5 MIN PRN
Status: DISCONTINUED | OUTPATIENT
Start: 2024-09-11 | End: 2024-09-11 | Stop reason: HOSPADM

## 2024-09-11 RX ORDER — BUPIVACAINE HYDROCHLORIDE 5 MG/ML
INJECTION, SOLUTION PERINEURAL PRN
Status: DISCONTINUED | OUTPATIENT
Start: 2024-09-11 | End: 2024-09-11 | Stop reason: HOSPADM

## 2024-09-11 RX ORDER — LIDOCAINE HYDROCHLORIDE 20 MG/ML
INJECTION, SOLUTION INFILTRATION; PERINEURAL PRN
Status: DISCONTINUED | OUTPATIENT
Start: 2024-09-11 | End: 2024-09-11

## 2024-09-11 RX ORDER — LABETALOL HYDROCHLORIDE 5 MG/ML
10 INJECTION, SOLUTION INTRAVENOUS
Status: DISCONTINUED | OUTPATIENT
Start: 2024-09-11 | End: 2024-09-11 | Stop reason: HOSPADM

## 2024-09-11 RX ORDER — DEXMEDETOMIDINE HYDROCHLORIDE 4 UG/ML
INJECTION, SOLUTION INTRAVENOUS PRN
Status: DISCONTINUED | OUTPATIENT
Start: 2024-09-11 | End: 2024-09-11

## 2024-09-11 RX ORDER — APREPITANT 40 MG/1
40 CAPSULE ORAL ONCE
Status: COMPLETED | OUTPATIENT
Start: 2024-09-11 | End: 2024-09-11

## 2024-09-11 RX ORDER — OXYCODONE HYDROCHLORIDE 5 MG/1
5 TABLET ORAL
Status: COMPLETED | OUTPATIENT
Start: 2024-09-11 | End: 2024-09-11

## 2024-09-11 RX ORDER — FENTANYL CITRATE 50 UG/ML
50 INJECTION, SOLUTION INTRAMUSCULAR; INTRAVENOUS EVERY 5 MIN PRN
Status: DISCONTINUED | OUTPATIENT
Start: 2024-09-11 | End: 2024-09-11 | Stop reason: HOSPADM

## 2024-09-11 RX ORDER — HYDROMORPHONE HYDROCHLORIDE 1 MG/ML
0.4 INJECTION, SOLUTION INTRAMUSCULAR; INTRAVENOUS; SUBCUTANEOUS EVERY 5 MIN PRN
Status: DISCONTINUED | OUTPATIENT
Start: 2024-09-11 | End: 2024-09-11 | Stop reason: HOSPADM

## 2024-09-11 RX ORDER — SCOLOPAMINE TRANSDERMAL SYSTEM 1 MG/1
1 PATCH, EXTENDED RELEASE TRANSDERMAL ONCE
Status: DISCONTINUED | OUTPATIENT
Start: 2024-09-11 | End: 2024-09-11 | Stop reason: HOSPADM

## 2024-09-11 RX ORDER — ONDANSETRON 4 MG/1
4 TABLET, ORALLY DISINTEGRATING ORAL EVERY 30 MIN PRN
Status: DISCONTINUED | OUTPATIENT
Start: 2024-09-11 | End: 2024-09-11 | Stop reason: HOSPADM

## 2024-09-11 RX ORDER — LIDOCAINE 40 MG/G
CREAM TOPICAL
Status: DISCONTINUED | OUTPATIENT
Start: 2024-09-11 | End: 2024-09-11 | Stop reason: HOSPADM

## 2024-09-11 RX ORDER — SODIUM CHLORIDE, SODIUM LACTATE, POTASSIUM CHLORIDE, CALCIUM CHLORIDE 600; 310; 30; 20 MG/100ML; MG/100ML; MG/100ML; MG/100ML
INJECTION, SOLUTION INTRAVENOUS CONTINUOUS PRN
Status: DISCONTINUED | OUTPATIENT
Start: 2024-09-11 | End: 2024-09-11

## 2024-09-11 RX ORDER — DEXAMETHASONE SODIUM PHOSPHATE 4 MG/ML
4 INJECTION, SOLUTION INTRA-ARTICULAR; INTRALESIONAL; INTRAMUSCULAR; INTRAVENOUS; SOFT TISSUE
Status: DISCONTINUED | OUTPATIENT
Start: 2024-09-11 | End: 2024-09-11 | Stop reason: HOSPADM

## 2024-09-11 RX ADMIN — METRONIDAZOLE 500 MG: 500 INJECTION, SOLUTION INTRAVENOUS at 06:54

## 2024-09-11 RX ADMIN — PROPOFOL 175 MCG/KG/MIN: 10 INJECTION, EMULSION INTRAVENOUS at 07:40

## 2024-09-11 RX ADMIN — SODIUM CHLORIDE, POTASSIUM CHLORIDE, SODIUM LACTATE AND CALCIUM CHLORIDE: 600; 310; 30; 20 INJECTION, SOLUTION INTRAVENOUS at 07:33

## 2024-09-11 RX ADMIN — OXYCODONE HYDROCHLORIDE 5 MG: 5 TABLET ORAL at 11:19

## 2024-09-11 RX ADMIN — DEXMEDETOMIDINE HYDROCHLORIDE 8 MCG: 4 INJECTION, SOLUTION INTRAVENOUS at 08:36

## 2024-09-11 RX ADMIN — ROCURONIUM BROMIDE 20 MG: 10 INJECTION INTRAVENOUS at 08:21

## 2024-09-11 RX ADMIN — DEXAMETHASONE SODIUM PHOSPHATE 5 MG: 4 INJECTION, SOLUTION INTRA-ARTICULAR; INTRALESIONAL; INTRAMUSCULAR; INTRAVENOUS; SOFT TISSUE at 07:48

## 2024-09-11 RX ADMIN — ROCURONIUM BROMIDE 10 MG: 10 INJECTION INTRAVENOUS at 08:13

## 2024-09-11 RX ADMIN — Medication 2 G: at 07:22

## 2024-09-11 RX ADMIN — ROCURONIUM BROMIDE 50 MG: 10 INJECTION INTRAVENOUS at 07:40

## 2024-09-11 RX ADMIN — PROPOFOL 200 MG: 10 INJECTION, EMULSION INTRAVENOUS at 07:40

## 2024-09-11 RX ADMIN — SODIUM CHLORIDE, POTASSIUM CHLORIDE, SODIUM LACTATE AND CALCIUM CHLORIDE: 600; 310; 30; 20 INJECTION, SOLUTION INTRAVENOUS at 06:51

## 2024-09-11 RX ADMIN — DEXMEDETOMIDINE HYDROCHLORIDE 4 MCG: 4 INJECTION, SOLUTION INTRAVENOUS at 08:38

## 2024-09-11 RX ADMIN — DEXMEDETOMIDINE HYDROCHLORIDE 4 MCG: 4 INJECTION, SOLUTION INTRAVENOUS at 08:55

## 2024-09-11 RX ADMIN — SODIUM CHLORIDE, POTASSIUM CHLORIDE, SODIUM LACTATE AND CALCIUM CHLORIDE: 600; 310; 30; 20 INJECTION, SOLUTION INTRAVENOUS at 08:24

## 2024-09-11 RX ADMIN — FENTANYL CITRATE 50 MCG: 50 INJECTION, SOLUTION INTRAMUSCULAR; INTRAVENOUS at 10:33

## 2024-09-11 RX ADMIN — ONDANSETRON 4 MG: 2 INJECTION INTRAMUSCULAR; INTRAVENOUS at 07:45

## 2024-09-11 RX ADMIN — SCOPALAMINE 1 PATCH: 1 PATCH, EXTENDED RELEASE TRANSDERMAL at 07:10

## 2024-09-11 RX ADMIN — SUGAMMADEX 200 MG: 100 INJECTION, SOLUTION INTRAVENOUS at 09:16

## 2024-09-11 RX ADMIN — FENTANYL CITRATE 25 MCG: 50 INJECTION, SOLUTION INTRAMUSCULAR; INTRAVENOUS at 10:18

## 2024-09-11 RX ADMIN — PROPOFOL 175 MCG/KG/MIN: 10 INJECTION, EMULSION INTRAVENOUS at 08:08

## 2024-09-11 RX ADMIN — FENTANYL CITRATE 50 MCG: 50 INJECTION INTRAMUSCULAR; INTRAVENOUS at 08:24

## 2024-09-11 RX ADMIN — FENTANYL CITRATE 100 MCG: 50 INJECTION INTRAMUSCULAR; INTRAVENOUS at 07:40

## 2024-09-11 RX ADMIN — FENTANYL CITRATE 50 MCG: 50 INJECTION INTRAMUSCULAR; INTRAVENOUS at 08:20

## 2024-09-11 RX ADMIN — APREPITANT 40 MG: 40 CAPSULE ORAL at 07:14

## 2024-09-11 RX ADMIN — TRANEXAMIC ACID 1 G: 1 INJECTION, SOLUTION INTRAVENOUS at 08:52

## 2024-09-11 RX ADMIN — LIDOCAINE HYDROCHLORIDE 40 MG: 20 INJECTION, SOLUTION INFILTRATION; PERINEURAL at 07:40

## 2024-09-11 RX ADMIN — DEXMEDETOMIDINE HYDROCHLORIDE 8 MCG: 4 INJECTION, SOLUTION INTRAVENOUS at 08:48

## 2024-09-11 RX ADMIN — HYDROMORPHONE HYDROCHLORIDE 0.5 MG: 1 INJECTION, SOLUTION INTRAMUSCULAR; INTRAVENOUS; SUBCUTANEOUS at 08:06

## 2024-09-11 RX ADMIN — MIDAZOLAM 2 MG: 1 INJECTION INTRAMUSCULAR; INTRAVENOUS at 07:27

## 2024-09-11 ASSESSMENT — ACTIVITIES OF DAILY LIVING (ADL)
ADLS_ACUITY_SCORE: 18

## 2024-09-11 NOTE — ANESTHESIA CARE TRANSFER NOTE
Patient: Elisha Nunez    Procedure: Procedure(s):  APPENDECTOMY, LAPAROSCOPIC  Exploratory Laparoscopy with uterine and peritoneal biopsies       Diagnosis: Abdominal pain, generalized [R10.84]  Ovarian mass [N83.8]  Pelvic pain [R10.2]  Diagnosis Additional Information: No value filed.    Anesthesia Type:   General     Note:    Oropharynx: oral airway in place  Level of Consciousness: drowsy  Oxygen Supplementation: nasal cannula  Level of Supplemental Oxygen (L/min / FiO2): 4  Independent Airway: airway patency satisfactory and stable  Dentition: dentition unchanged  Vital Signs Stable: post-procedure vital signs reviewed and stable  Report to RN Given: handoff report given  Patient transferred to: PACU    Handoff Report: Identifed the Patient, Identified the Reponsible Provider, Reviewed the pertinent medical history, Discussed the surgical course, Reviewed Intra-OP anesthesia mangement and issues during anesthesia, Set expectations for post-procedure period and Allowed opportunity for questions and acknowledgement of understanding      Vitals:  Vitals Value Taken Time   /84 09/11/24 0940   Temp     Pulse 71 09/11/24 0943   Resp 16 09/11/24 0943   SpO2 97 % 09/11/24 0943   Vitals shown include unfiled device data.    Electronically Signed By: Kaiden Parikh  September 11, 2024  9:43 AM

## 2024-09-11 NOTE — ANESTHESIA POSTPROCEDURE EVALUATION
Patient: Elisha Nunez    Procedure: Procedure(s):  APPENDECTOMY, LAPAROSCOPIC  Exploratory Laparoscopy with uterine and peritoneal biopsies       Anesthesia Type:  General    Note:  Disposition: Outpatient   Postop Pain Control: Uneventful            Sign Out: Well controlled pain   PONV: No   Neuro/Psych: Uneventful            Sign Out: Acceptable/Baseline neuro status   Airway/Respiratory: Uneventful            Sign Out: Acceptable/Baseline resp. status   CV/Hemodynamics: Uneventful            Sign Out: Acceptable CV status; No obvious hypovolemia; No obvious fluid overload   Other NRE: NONE   DID A NON-ROUTINE EVENT OCCUR? No           Last vitals:  Vitals Value Taken Time   /91 09/11/24 1100   Temp 97.7  F (36.5  C) 09/11/24 1100   Pulse 72 09/11/24 1103   Resp 11 09/11/24 1103   SpO2 96 % 09/11/24 1103   Vitals shown include unfiled device data.    Electronically Signed By: MARKUS Soliz CRNA  September 11, 2024  12:37 PM

## 2024-09-11 NOTE — OR NURSING
Patient and responsible adult given discharge instructions with no questions regarding instructions. Mike score 20/20. Pain level 4/10.  Discharged from unit via wheelchair. Patient discharged to home with mom.

## 2024-09-11 NOTE — INTERVAL H&P NOTE
"I was consulted by Dr. Box for intraoperative evaluation for 45 yo G0 f who is undergoing laparoscopic appendectomy for RLQ abdominal and pelvic pain.  GYN h/o unremarkable but has had painful periods/pelvic pain for several years.  She did have and US showing bilaterally ovarian enlargement/cysts, suspected endometriomas.  Plan exploratory/diagnostic pelvic laparoscopy at time of appendectomy, indicated procedures including oophorectomy.  Reviewed goals, risks, alternatives for planned procedure;  Including risk of bleeding, transfusion, infection, damage to nerves, blood vessels, bowel and bladder, blood clots, pneumonia, and other rare or unforseen complications.   Discussed recovery period and expected discomfort.. All questions were answered.  Patient desires to proceed.  Consent forms reviewed and signed.     I have reviewed the surgical (or preoperative) H&P that is linked to this encounter, and examined the patient. There are no significant changes    Clinical Conditions Present on Arrival:  Clinically Significant Risk Factors Present on Admission        # Hypokalemia: Lowest K = 3.3 mmol/L in last 2 days, will replace as needed               # Severe Obesity: Estimated body mass index is 47.92 kg/m  as calculated from the following:    Height as of this encounter: 1.575 m (5' 2\").    Weight as of this encounter: 118.8 kg (262 lb).       "

## 2024-09-11 NOTE — ANESTHESIA PROCEDURE NOTES
Airway       Patient location during procedure: OR       Procedure Start/Stop Times: 9/11/2024 7:42 AM  Staff -        Resident/Fellow: Kaiden Parikh       CRNA: Macey Matthews APRN CRNA       Performed By: CRNA and SRNAIndications and Patient Condition       Indications for airway management: crystal-procedural         Mask difficulty assessment: 1 - vent by mask    Final Airway Details       Final airway type: endotracheal airway       Successful airway: ETT - single  Endotracheal Airway Details        ETT size (mm): 7.0       Successful intubation technique: direct laryngoscopy       DL Blade Type: Holloway 2       Grade View of Cords: 1       Adjucts: stylet       Position: Right       Secured at (cm): 21       Bite block used: None    Post intubation assessment        Placement verified by: capnometry, equal breath sounds and chest rise        Number of attempts at approach: 2       Secured with: tape       Ease of procedure: easy       Dentition: Intact and Unchanged    Medication(s) Administered   Medication Administration Time: 9/11/2024 7:42 AM

## 2024-09-11 NOTE — OP NOTE
[facility]  Operative Note    Pre-operative diagnosis: Abdominal pain, generalized [R10.84]  Ovarian masses, bilateral   Pelvic pain [R10.2]   Post-operative diagnosis Severe (stage 4)  endometriosis   Procedure: Procedure(s):  APPENDECTOMY, LAPAROSCOPIC  Exploratory Laparoscopy with uterine and peritoneal biopsies  (Combined case with Dr. Carlitos Box General surgery who performed laparoscopic appendectomy, see separate operative note)   Surgeon:  Assistant: MD Enid Small RN    Anesthesia: General    Estimated blood loss: 20 ml for my portion of the case   Blood transfusion: No transfusion was given during surgery   Drains: None   Specimens: Peritoneal and uterine serosal biopsies and pelvic cytology   Findings: Severe pelvic endometriosis with complete obliteration of the cul-de-sac. Bilateral adhered, enlarged adnexa fixed in posterior cul-de-sac and densely adherent to surrounding cecum and rectum.   Pelvic adhesions.  .     Complications: None   Condition: Stable       OPERATIVE NARRATION: The patient was brought to the Operating Room and uneventfully placed under general anesthesia. She was prepped and draped in the dorsal lithotomy position and her bladder drained. The cervix was visualized with a speculum and grasped anteriorly with a fine tooth tenaculum and a uterine manipulating device placed. We then changed gloves and proceeded to the abdominal portion of the case.  The uterus was immobile abdominal and pelvic exploration was performed with findings as described above.  Pelvic cytology, peritoneal and uterine serosal biopsies were obtained. The pelvis was irrigated and checked for hemostasis.  Light monopolar cautery and Endo Avitene were used to obtain hemostasis.  We then proceeded to closure. The  trocars were removed and excess carbon dioxide expressed from the abdomen. The suprapubic abdominal fascia was closed with interrupted 0 Vicryl suture.   The  subcutaneous spaces were irrigated and checked for hemostasis. The skin incisions were closed with subcuticular 3.0 Monocryl and surgical glue. There were no complications. The uterine manipulating devise was removed.  The patient was transferred to the Recovery Room in excellent and stable condition.     SELMA SHIELDS MD

## 2024-09-11 NOTE — OP NOTE
REPORT OF OPERATION  DATE OF PROCEDURE: 9/11/2024    PATIENT: Elisha Nunez    SURGERY PERFORMED: Laparoscopic Appendectomy    PREOPERATIVE DIAGNOSIS: Enlarged Appendix and ovarian masses    POSTOPERATIVE DIAGNOSIS:  Thickened sclerotic appendix with dense adhesions to the peritoneal fat.  Ovarian masses.  Fluid within the pelvis.  Dense adhesions of ovaries uterus and cecum.  Evidence of endometriosis    SURGEON: Carlitos Box MD    ASSISTANTS: Dr. Daniel Lugo.  Dr. Lugo's assistance was required because the technical aspects of the case.    ANESTHESIA: General Endotracheal Anesthesia    COMPLICATIONS: None apparent    ESTIMATED BLOOD LOSS: * No values recorded between 9/11/2024  7:54 AM and 9/11/2024  9:37 AM *    TRANSFUSIONS: None    TISSUE TO PATHOLOGY: Appendix to Pathology for pathological diagnosis    FINDINGS: Thickened sclerotic appendix with dense adhesions to the peritoneal fat.  Ovarian masses.  Fluid within the pelvis.  Dense adhesions of ovaries uterus and cecum.  Evidence of endometriosis    INDICATIONS:  This is a 44 year old female with focal right lower quadrant peritoneal signs, a CT consistent with Abnormal Appearing Appendix on CT and a normal WBC.  The patient will be taken to the operating suite for a laparoscopic possible open appendectomy.     DESCRIPTIONS OF PROCEDURE IN DETAIL: After consent was obtained the patient was taken to the operative suite and toy in the supine position.  The patient was identified and the correct patient was confirmed.  General endotracheal anesthesia was induced by anesthesia.  The patient was sterilely prepped and draped in the usual fashion.  A time out was performed verifying the correct patient and the correct procedure.  The entire operative team was in agreement.  All necessary equipment and supplies were in the room.    Through a supraumbilical incision, the skin was sharply entered and dissection was carried down until isolation of  the fascia.  Via Rony technique, two stay sutures of 0 Vicryl were placed and the fascia was incised.  Entrance into the abdomen was accomplished.  A 10mm blunt-ended trocar was then inserted into the abdomen and pneumoperitoneum was obtained.  The laparoscope was inserted through the trocar and verification of no intraabdominal trauma was made.  Under direct visualization two 5mm left lateral trocars were placed with verification of no intraabdominal trauma.  Additional 5 mm trocar was placed within the midline.  At this point the appendix found to be densely adhered to the retroperitoneal fat with adhesions overlying.  There is also fluid within the pelvis.  The bilateral ovaries along with the uterus was densely adhered to the cecum down in the pelvis.  There was evidence of endometriosis.  With careful dissection the appendix was identified and retracted and the dense adhesions to the retroperitoneal fat were taken down with the LigaSure.  This allowed the appendix to be isolated isolated and retracted.  The cecum and right colon did not require mobilization.  The mesoappendix was taken down using the Ligasure until the base of the appendix was completely isolated.  The appendix was amputated using the Endo-JORDAN stapler.  The appendix was removed from the abdomen and sent to pathology for pathological diagnosis.  Hemostasis was assured,  The abdomen was irrigated and the irrigant was removed.  Hemostasis was again assured.       At this point the procedure was turned over to Dr Jean Stringer of OB/GYN for a concordant procedure.  Prior to turning the procedure over to Dr. Stringer all sponge, needle and instrument counts were correct.

## 2024-09-11 NOTE — DISCHARGE INSTRUCTIONS
After Anesthesia (Sleep Medicine)  What should I do after anesthesia?  You should rest and relax for the next 24 hours. Avoid risky or difficult (strenuous) activity. A responsible adult should stay with you overnight.  Don't drive or use any heavy equipment for 24 hours. Even if you feel normal, your reactions may be affected by the sleep medicine given to you.  Don't drink alcohol or make any important decisions for 24 hours.  Slowly get back to your regular diet, as you feel able.  How should I expect to feel?  It's normal to feel dizzy, light-headed, or faint for up to a full day after anesthesia or while taking pain medicine. If this happens:   Sit down for a few minutes before standing.  Have someone help you when you get up to walk or use the bathroom.  If you have nausea (feel sick to your stomach) or vomit (throw up):   Drink clear liquids (such as apple juice, ginger ale, broth, or 7UP) until you feel better.  If you feel sick to your stomach, or you keep vomiting for 24 hours, please call the doctor.  What else should I know?  You might have a dry mouth, sore throat, muscle aches, or trouble sleeping. These should go away after 24 hours.  Please contact your doctor if you have any other symptoms that concern you, such as fever, pain, bleeding, fluid drainage, swelling, or headache, or if it's been over 8 to 10 hours and you still aren't able to pee (urinate).  If you have a history of sleep apnea, it's very important to use your CPAP machine for the next 24 hours when you nap or sleep.   For informational purposes only. Not to replace the advice of your health care provider. Copyright   2023 [employer]. All rights reserved. Clinically reviewed by Aleksandr Bahena MD. [employer] [id number] - REV 09/23.    Appendectomy: What to Expect at Home  Your Recovery     Your doctor removed your appendix either by making many small cuts, called incisions, in your belly (laparoscopic surgery) or through  open surgery. In open surgery, the doctor makes one large incision. The incisions leave scars that usually fade over time.  After your surgery, it is normal to feel weak and tired for several days after you return home. Your belly may be swollen and may be painful. If you had laparoscopic surgery, you may have shoulder pain. This is caused by the air the doctor put in your belly to help see the organs better. The pain may last for a day or two.  You may also have nausea or vomiting, diarrhea, constipation, gas, or a headache. These problems usually go away in a few days.  Your recovery time depends on the type of surgery you had. If you had laparoscopic surgery, you will probably be able to return to work or a normal routine in a couple of weeks after surgery. If you had an open surgery, it may take longer. If your appendix ruptured, you may have a drain in your incision.  Your body will work fine without an appendix. You won't have to make any changes in your diet or lifestyle.  This care sheet gives you a general idea about how long it will take for you to recover. But each person recovers at a different pace. Follow the steps below to get better as quickly as possible.  How can you care for yourself at home?  Activity    Rest when you feel tired. Getting enough sleep will help you recover.     Try to walk each day. Start by walking a little more than you did the day before. Bit by bit, increase the amount you walk. Walking boosts blood flow and helps prevent pneumonia and constipation.     For about 2 weeks, avoid lifting anything that would make you strain. This may include a child, heavy grocery bags and milk containers, a heavy briefcase or backpack, cat litter or dog food bags, or a vacuum .     Avoid strenuous activities, such as bicycle riding, jogging, weight lifting, or aerobic exercise. Do this for about 2 weeks or until your doctor says it is okay.     You may be able to take showers (unless you  have a drain near your incision) 24 to 48 hours after surgery. Pat the incision dry. Do not take a bath for the first 2 weeks, or until your doctor tells you it is okay. If you have a drain near your incision, follow your doctor's instructions.     You may drive when you are no longer taking pain medicine and can quickly move your foot from the gas pedal to the brake. You must also be able to sit comfortably for a long period of time, even if you do not plan on going far. You might get caught in traffic.     You will probably be able to go back to work in 1 to 2 weeks. If you had an open surgery, it may take longer.     Ask your doctor when it is okay for you to have sex.   Diet    You can eat your normal diet. If your stomach is upset, start with small amounts of food.     Drink plenty of fluids (unless your doctor tells you not to).     You may notice that your bowel movements are not regular right after your surgery. This is common. Try to avoid constipation and straining with bowel movements. You may want to take a fiber supplement every day. If you have not had a bowel movement after a couple of days, ask your doctor about taking a mild laxative.   Medicines    Your doctor will tell you if and when you can restart your medicines. You also will be given instructions about taking any new medicines.     If you stopped taking aspirin or some other blood thinner, your doctor will tell you when to start taking it again.     If your appendix ruptured, you will need to take antibiotics. Take them as directed. Do not stop taking them just because you feel better. You need to take the full course of antibiotics.     Be safe with medicines. Take pain medicines exactly as directed.  If the doctor gave you a prescription medicine for pain, take it as prescribed.  If you are not taking a prescription pain medicine, take an over-the-counter medicine such as acetaminophen (Tylenol), ibuprofen (Advil, Motrin), or naproxen  (Lynda). Read and follow all instructions on the label.  Do not take two or more pain medicines at the same time unless the doctor told you to. Many pain medicines have acetaminophen, which is Tylenol. Too much Tylenol can be harmful.     If you think your pain medicine is making you sick to your stomach:  Take your medicine after meals (unless your doctor has told you not to).  Ask your doctor for a different pain medicine.   Incision care    If you had an open surgery, you may have staples in your incision. The doctor will take these out in 7 to 10 days.     If you have strips of tape on the incision, leave the tape on until it falls off.     Gently wash the area with warm, soapy water 24 to 48 hours after your surgery, unless your doctor tells you not to. Pat the area dry.     Keep the area clean and dry. You may cover it with a gauze bandage if it oozes or rubs against clothing. Change the bandage every day or more often if needed.     If your appendix ruptured, you may have an incision with packing in it. Change the packing as often as your doctor tells you to.  Packing changes may hurt at first. Taking pain medicine about half an hour before you change the packing can help.  If your dressing sticks to your wound, try soaking it with warm water for about 10 minutes before you remove it. You can do this in the shower or by placing a wet washcloth over the dressing.  Remove the old packing and rinse the incision with water. Gently pat the top area dry.  The size of the incision determines how much gauze you need to put inside. Fold the gauze over once, but do not wad it up so that it hurts. Put it in the wound carefully. You want to keep the sides of the wound from touching. A cotton swab may help you push the gauze in as needed.  Put a gauze pad over the wound, and tape it down.  You may notice yellow or grayish fluid oozing from your wound as you start to heal. This is normal. It is a sign that your wound is  "healing.   Other instructions    If your appendix ruptured, you may have a tube that drains fluid from the incision. The doctor will tell you how to take care of it.   Follow-up care is a key part of your treatment and safety. Be sure to make and go to all appointments, and call your doctor if you are having problems. It's also a good idea to know your test results and keep a list of the medicines you take.  When should you call for help?   Call 911 anytime you think you may need emergency care. For example, call if:    You passed out (lost consciousness).     You are short of breath.   Call your doctor now or seek immediate medical care if:    You have nausea or vomiting and cannot drink fluids.     You cannot pass stools or gas.     You have pain that does not get better when you take your pain medicine.     You have signs of infection, such as:  Increased pain, swelling, warmth, or redness.  Red streaks leading from the wound.  Pus draining from the wound.  A fever.     You have loose stitches, or your incision comes open.     Bright red blood has soaked through the bandage over your incision.     You have signs of a blood clot in your leg (called a deep vein thrombosis), such as:  Pain in your calf, back of knee, thigh, or groin.  Redness and swelling in your leg or groin.   Watch closely for any changes in your health, and be sure to contact your doctor if you have any problems.  Where can you learn more?  Go to https://www.[employer].net/patiented  Enter X801 in the search box to learn more about \"Appendectomy: What to Expect at Home.\"  Current as of: July 26, 2023               Content Version: 14.0    6377-5433 [employer].   Care instructions adapted under license by your healthcare professional. If you have questions about a medical condition or this instruction, always ask your healthcare professional. [employer] disclaims any warranty or liability for your use of this " information.    Laparoscopy: What to Expect at Home  Your Recovery  After laparoscopic surgery, you are likely to have pain for the next several days. You may have a low fever and feel tired and sick to your stomach. This is common. You should feel better after 1 to 2 weeks.  This care sheet gives you a general idea about how long it will take for you to recover. But each person recovers at a different pace. Follow the steps below to get better as quickly as possible.  How can you care for yourself at home?  Activity    Rest when you feel tired. Getting enough sleep will help you recover.     Try to walk each day. Start by walking a little more than you did the day before. Bit by bit, increase the amount you walk. Walking boosts blood flow and helps prevent pneumonia and constipation.     Avoid strenuous activities, such as bicycle riding, jogging, weight lifting, or aerobic exercise, until your doctor says it is okay.     Avoid lifting anything that would make you strain. This may include a child, heavy grocery bags and milk containers, a heavy briefcase or backpack, cat litter or dog food bags, or a vacuum .     You may also have some shoulder or back pain. This pain is caused by the gas your doctor used to inflate your belly to help see your organs better. The pain usually lasts about 1 or 2 days.     You may drive when you are no longer taking pain medicine and can quickly move your foot from the gas pedal to the brake. You must also be able to sit comfortably for a long period of time, even if you do not plan to go far. You might get caught in traffic.     You may need to take a few days to a few weeks off work. It depends on the type of work you do and how you feel.     You may shower 24 to 48 hours after surgery, if your doctor okays it. Pat the cut (incision) dry. Do not take a bath for the first 2 weeks, or until your doctor tells you it is okay.   Diet    If your stomach is upset, try bland, low-fat  foods such as plain rice, broiled chicken, toast, and yogurt.     Drink plenty of fluids to prevent dehydration. Choose water and other clear liquids. If you have kidney, heart, or liver disease and have to limit fluids, talk with your doctor before you increase the amount of fluids you drink.     You may notice that your bowel movements are not regular right after your surgery. This is common. Avoid constipation and straining with bowel movements. You may want to take a fiber supplement every day. If you have not had a bowel movement after a couple of days, ask your doctor about taking a mild laxative.   Medicines    Your doctor will tell you if and when you can restart your medicines. You will also get instructions about taking any new medicines.     If you stopped taking aspirin or some other blood thinner, your doctor will tell you when to start taking it again.     Take pain medicines exactly as directed.  If the doctor gave you a prescription medicine for pain, take it as prescribed.  If you are not taking a prescription pain medicine, ask your doctor if you can take an over-the-counter medicine.     If your doctor prescribed antibiotics, take them as directed. Do not stop taking them just because you feel better. You need to take the full course of antibiotics.     If you think your pain medicine is making you sick to your stomach:  Take your medicine after meals (unless your doctor has told you not to).  Ask your doctor for a different pain medicine.   Incision care    If you have strips of tape on the incision, leave the tape on for a week or until it falls off.     Wash the area daily with warm, soapy water and pat it dry. Don't use hydrogen peroxide or alcohol, which can slow healing. You may cover the area with a gauze bandage if it weeps or rubs against clothing. Change the bandage every day.   Follow-up care is a key part of your treatment and safety. Be sure to make and go to all appointments, and  "call your doctor if you are having problems. It's also a good idea to know your test results and keep a list of the medicines you take.  When should you call for help?   Call 911 anytime you think you may need emergency care. For example, call if:    You passed out (lost consciousness).     You are short of breath.   Call your doctor now or seek immediate medical care if:    You have pain that does not get better after you take pain medicine.     You have loose stitches, or your incision comes open.     Bright red blood has soaked through your bandage.     You have signs of infection, such as:  Increased pain, swelling, warmth, or redness.  Red streaks leading from the incision.  Pus draining from the incision.  A fever.     You are sick to your stomach or cannot keep fluids down.     You have signs of a blood clot in your leg (called a deep vein thrombosis), such as:  Pain in your calf, back of the knee, thigh, or groin.  Redness and swelling in your leg or groin.     You cannot pass stools or gas.   Watch closely for any changes in your health, and be sure to contact your doctor if you have any problems.  Where can you learn more?  Go to https://www.[employer].net/patiented  Enter G657 in the search box to learn more about \"Laparoscopy: What to Expect at Home.\"  Current as of: July 26, 2023               Content Version: 14.0    3936-0111 [employer]   Care instructions adapted under license by your healthcare professional. If you have questions about a medical condition or this instruction, always ask your healthcare professional. [employer] disclaims any warranty or liability for your use of this information.            "

## 2024-09-12 DIAGNOSIS — N83.8 OVARIAN MASS: Primary | ICD-10-CM

## 2024-09-12 DIAGNOSIS — N80.9 ENDOMETRIOSIS: ICD-10-CM

## 2024-09-12 DIAGNOSIS — N73.6 PELVIC ADHESIVE DISEASE: ICD-10-CM

## 2024-09-13 ENCOUNTER — TELEPHONE (OUTPATIENT)
Dept: OBGYN | Facility: OTHER | Age: 44
End: 2024-09-13

## 2024-09-13 NOTE — TELEPHONE ENCOUNTER
Patient called and said that Rekha marquez does not feel comfortable doing the surgery due to how extensive it is. She

## 2024-09-17 ENCOUNTER — TELEPHONE (OUTPATIENT)
Dept: SURGERY | Facility: OTHER | Age: 44
End: 2024-09-17

## 2024-09-17 DIAGNOSIS — N80.9 ENDOMETRIOSIS: ICD-10-CM

## 2024-09-17 DIAGNOSIS — R10.2 PELVIC PAIN IN FEMALE: Primary | ICD-10-CM

## 2024-09-17 DIAGNOSIS — H66.3X1 OTHER CHRONIC SUPPURATIVE OTITIS MEDIA OF RIGHT EAR: Chronic | ICD-10-CM

## 2024-09-17 LAB
PATH REPORT.COMMENTS IMP SPEC: NORMAL
PATH REPORT.COMMENTS IMP SPEC: NORMAL
PATH REPORT.FINAL DX SPEC: NORMAL
PATH REPORT.GROSS SPEC: NORMAL
PATH REPORT.MICROSCOPIC SPEC OTHER STN: NORMAL
PATH REPORT.RELEVANT HX SPEC: NORMAL
PHOTO IMAGE: NORMAL

## 2024-09-17 RX ORDER — HYDROCODONE BITARTRATE AND ACETAMINOPHEN 5; 325 MG/1; MG/1
1-2 TABLET ORAL EVERY 4 HOURS PRN
Qty: 30 TABLET | Refills: 0 | Status: SHIPPED | OUTPATIENT
Start: 2024-09-17

## 2024-09-17 NOTE — TELEPHONE ENCOUNTER
"----- Message from Jean Stringer sent at 9/17/2024  9:24 AM CDT -----  Regarding: RE: Post op pain  I did a refill on pain medication.    I did not add muscle relaxor as she is already on one (Klonepin)  ----- Message -----  From: Hattie Davenport RN  Sent: 9/16/2024  11:03 AM CDT  To: Jean Stringer MD; Carlitos Box MD  Subject: Post op pain                                     Patient calling asking to have pain med Norco refilled after lap appe/procedure done with [facility] as well. Said she was told to reach out. She's also wondering if a muscle relaxer can be prescribed \"to help with the cramping from the endometriosis that was diagnosed\". Prefers [facility] in [place] pharmacy if refilling.     Hattie Davenport RN on 9/16/2024 at 11:03 AM  "

## 2024-09-18 LAB
PATH REPORT.COMMENTS IMP SPEC: NORMAL
PATH REPORT.FINAL DX SPEC: NORMAL
PATH REPORT.GROSS SPEC: NORMAL
PATH REPORT.MICROSCOPIC SPEC OTHER STN: NORMAL

## 2024-09-24 PROBLEM — N83.201 CYSTS OF BOTH OVARIES: Status: ACTIVE | Noted: 2024-09-19

## 2024-09-24 PROBLEM — N80.9 ENDOMETRIOSIS DETERMINED BY LAPAROSCOPY: Status: ACTIVE | Noted: 2024-09-19

## 2024-09-24 PROBLEM — N83.202 CYSTS OF BOTH OVARIES: Status: ACTIVE | Noted: 2024-09-19

## 2024-10-01 ENCOUNTER — OFFICE VISIT (OUTPATIENT)
Dept: SURGERY | Facility: OTHER | Age: 44
End: 2024-10-01
Attending: NURSE PRACTITIONER
Payer: COMMERCIAL

## 2024-10-01 VITALS
HEART RATE: 86 BPM | TEMPERATURE: 99.4 F | OXYGEN SATURATION: 98 % | WEIGHT: 262 LBS | BODY MASS INDEX: 46.42 KG/M2 | SYSTOLIC BLOOD PRESSURE: 146 MMHG | DIASTOLIC BLOOD PRESSURE: 78 MMHG | HEIGHT: 63 IN | RESPIRATION RATE: 18 BRPM

## 2024-10-01 DIAGNOSIS — Z90.49 STATUS POST APPENDECTOMY: Primary | ICD-10-CM

## 2024-10-01 PROCEDURE — G0463 HOSPITAL OUTPT CLINIC VISIT: HCPCS

## 2024-10-01 PROCEDURE — 99024 POSTOP FOLLOW-UP VISIT: CPT | Performed by: NURSE PRACTITIONER

## 2024-10-01 RX ORDER — HYDROCODONE BITARTRATE AND ACETAMINOPHEN 5; 325 MG/1; MG/1
1-2 TABLET ORAL EVERY 4 HOURS PRN
Qty: 30 TABLET | Refills: 0 | Status: CANCELLED | OUTPATIENT
Start: 2024-10-01

## 2024-10-01 ASSESSMENT — PAIN SCALES - GENERAL: PAINLEVEL: WORST PAIN (10)

## 2024-10-01 NOTE — PROGRESS NOTES
"CLINIC NOTE - POST-OP SURGERY  10/1/2024    Patient:Elisha Nunez    Procedure: Appendectomy  laparoscopic     This is a 44 year old female who is 3 weeks s/p Laparoscopic Appendectomy .  The patient is having significant generalized abdominal pain.  She denies pain complaints to incisional site.  She has seen Dr. Gann and will be having a hysterectomy for endometriosis and ovarian masses.     Current Medications:  Current Outpatient Medications   Medication Sig Dispense Refill    busPIRone (BUSPAR) 15 MG tablet Take 1 tablet by mouth 2 times daily.      clonazePAM (KLONOPIN) 0.5 MG tablet Take 1 tablet by mouth 2 times daily.      etonogestrel-ethinyl estradiol (NUVARING) 0.12-0.015 MG/24HR vaginal ring Place 1 each vaginally every 28 days Place for 3 weeks and then remove for one week      HYDROcodone-acetaminophen (NORCO) 5-325 MG tablet Take 1-2 tablets by mouth every 4 hours as needed for moderate to severe pain. 30 tablet 0    hydrOXYzine (ATARAX) 25 MG tablet Take 25 mg by mouth 3 times daily as needed for itching      metFORMIN (GLUCOPHAGE) 500 MG tablet Take 1 tablet by mouth daily at 2 pm.      metoclopramide (REGLAN) 10 MG tablet Take 1 tablet (10 mg) by mouth 3 times daily as needed. 15 tablet 0    OLANZapine zydis (ZYPREXA) 5 MG ODT Take 1 tablet (5 mg) by mouth every 8 hours as needed for other (nausea and vomiting). 20 tablet 0    sertraline (ZOLOFT) 100 MG tablet Take 200 mg by mouth      WEGOVY 0.5 MG/0.5ML pen Inject 0.5 mg subcutaneously. (Patient not taking: Reported on 10/1/2024)         Allergies:  Allergies   Allergen Reactions    Seasonal Allergies        PHYSICAL EXAM:   Vital signs: BP (!) 146/78 (BP Location: Right arm, Cuff Size: Adult Large)   Pulse 86   Temp 99.4  F (37.4  C) (Tympanic)   Resp 18   Ht 1.6 m (5' 3\")   Wt 118.8 kg (262 lb)   LMP 09/02/2024 (Approximate)   SpO2 98%   BMI 46.41 kg/m     BMI: Body mass index is 46.41 kg/m .   General: Normal, healthy, cooperative, " in no acute distress, alert   Lungs: respirations are non-labored   Abdominal: non-distended   Wounds:  Well healed surgical scars consistent with her operation.     PATHOLOGY:  A.  Appendix, appendectomy:  -Appendix with foci of endometriosis/endosalpingosis and extensive serosal adhesions.    ASSESSMENT:    44 year old female who is 3 weeks s/p Appendectomy  laparoscopic .  Doing well.     PLAN:   Patient case was discussed with the patient's PCP who will help the patient with pain management until her hysterectomy.    Follow-up as needed      Restrictions : Will continue lifting restrictions of no more than 10 pounds until 6 weeks after surgery

## 2024-10-02 ENCOUNTER — TELEPHONE (OUTPATIENT)
Dept: OBGYN | Facility: OTHER | Age: 44
End: 2024-10-02

## 2024-10-02 NOTE — TELEPHONE ENCOUNTER
Pt called regarding referral sent to [facility]. [facility] is 3 weeks out for her surgery and she was wondering if she could get a referral sent to the [facility] to see if it would be sooner. When she called to ask the [facility] they stated it is hard for them to gauge how far out surgery would be because they don't know what department she would start in. Would you be able to tell me which department they would start in so she can call and ask them?

## 2024-11-21 ENCOUNTER — MEDICAL CORRESPONDENCE (OUTPATIENT)
Dept: HEALTH INFORMATION MANAGEMENT | Facility: HOSPITAL | Age: 44
End: 2024-11-21

## 2025-04-09 ENCOUNTER — OFFICE VISIT (OUTPATIENT)
Dept: AUDIOLOGY | Facility: CLINIC | Age: 45
End: 2025-04-09
Payer: COMMERCIAL

## 2025-04-09 ENCOUNTER — OFFICE VISIT (OUTPATIENT)
Dept: OTOLARYNGOLOGY | Facility: CLINIC | Age: 45
End: 2025-04-09
Payer: COMMERCIAL

## 2025-04-09 DIAGNOSIS — H66.11 CHRONIC TUBOTYMPANIC SUPPURATIVE OTITIS MEDIA OF RIGHT EAR: Primary | ICD-10-CM

## 2025-04-09 DIAGNOSIS — H61.23 BILATERAL IMPACTED CERUMEN: ICD-10-CM

## 2025-04-09 DIAGNOSIS — H69.93 DYSFUNCTION OF BOTH EUSTACHIAN TUBES: ICD-10-CM

## 2025-04-09 DIAGNOSIS — H90.11 CONDUCTIVE HEARING LOSS OF RIGHT EAR WITH UNRESTRICTED HEARING OF LEFT EAR: Primary | ICD-10-CM

## 2025-04-09 PROCEDURE — 92557 COMPREHENSIVE HEARING TEST: CPT | Performed by: AUDIOLOGIST

## 2025-04-09 ASSESSMENT — ENCOUNTER SYMPTOMS
EYES NEGATIVE: 1
SINUS PAIN: 0
CONSTITUTIONAL NEGATIVE: 1
RESPIRATORY NEGATIVE: 1
GASTROINTESTINAL NEGATIVE: 1

## 2025-04-09 NOTE — PROGRESS NOTES
No chief complaint on file.      PCP: Julia Beltran     Referring Provider: No ref. provider found    There were no vitals taken for this visit.    ENT Problem List:  Patient Active Problem List   Diagnosis    Elevated liver enzymes    Chronic suppurative otitis media of right ear    Pain in joint, lower leg    Abdominal pain, right lower quadrant    Cysts of both ovaries    Endometriosis determined by laparoscopy      Current Medications:  Current Outpatient Medications   Medication Sig Dispense Refill    busPIRone (BUSPAR) 15 MG tablet Take 1 tablet by mouth 2 times daily.      clonazePAM (KLONOPIN) 0.5 MG tablet Take 1 tablet by mouth 2 times daily.      etonogestrel-ethinyl estradiol (NUVARING) 0.12-0.015 MG/24HR vaginal ring Place 1 each vaginally every 28 days Place for 3 weeks and then remove for one week      HYDROcodone-acetaminophen (NORCO) 5-325 MG tablet Take 1-2 tablets by mouth every 4 hours as needed for moderate to severe pain. 30 tablet 0    hydrOXYzine (ATARAX) 25 MG tablet Take 25 mg by mouth 3 times daily as needed for itching      metFORMIN (GLUCOPHAGE) 500 MG tablet Take 1 tablet by mouth daily at 2 pm.      metoclopramide (REGLAN) 10 MG tablet Take 1 tablet (10 mg) by mouth 3 times daily as needed. 15 tablet 0    OLANZapine zydis (ZYPREXA) 5 MG ODT Take 1 tablet (5 mg) by mouth every 8 hours as needed for other (nausea and vomiting). 20 tablet 0    sertraline (ZOLOFT) 100 MG tablet Take 200 mg by mouth      WEGOVY 0.5 MG/0.5ML pen Inject 0.5 mg subcutaneously. (Patient not taking: Reported on 10/1/2024)       No current facility-administered medications for this visit.     Surgical History:  Past Surgical History:   Procedure Laterality Date    ANTROSTOMY NASAL Bilateral 9/23/2019    Procedure: Bilateral Maxillary Antrostomy;  Surgeon: Stephan Crowley MD;  [facility]    CANALPLASTY Right 9/17/2018    Procedure: CANALPLASTY;;  Surgeon: Stephan Crowley MD;  [facility]     ETHMOIDECTOMY Bilateral 9/23/2019    Procedure: Bilateral Anterior Ethmoidectomy;  Surgeon: Stephan Crowley MD;  [facility]    GRAFT THIERSCH STATUS POST TYMPANOMASTOIDECTOMY Right 10/23/2017    Procedure: GRAFT THIERSCH STATUS POST TYMPANOMASTOIDECTOMY;  Right Removal of Granulation Tissue, Removal of Packing and Sutures, Thiersch Graft;  Surgeon: Stephan Crowley MD;  [facility]    GRAFT THIERSCH STATUS POST TYMPANOMASTOIDECTOMY Right 9/17/2018    Procedure: GRAFT THIERSCH STATUS POST TYMPANOMASTOIDECTOMY;  Right Ear Removal Of Granulation Tissue,  Canalplasty,   Meatoplasty,   Right Thiersch Graft,  Removal of Right Ear Existing Pressure Equalization Tube,  Myringotomy with placement of Paparella #2 Pressure Equalization Tube,  Myringoplasty  ;  Surgeon: Stephan Crowley MD;  [facility]    LAPAROSCOPIC APPENDECTOMY N/A 9/11/2024    Procedure: APPENDECTOMY, LAPAROSCOPIC;  Surgeon: Carlitos Box MD;  [facility]    LAPAROSCOPY DIAGNOSTIC (GYN)  9/11/2024    Procedure: Exploratory Laparoscopy with uterine and peritoneal biopsies;  Surgeon: Jean Stringer MD;  [facility]    MASTOIDECTOMY Right 10/12/2017    Procedure: MASTOIDECTOMY;;  Surgeon: Stephan Crowley MD;  [facility]    MEATOPLASTY EAR Right 10/12/2017    Procedure: MEATOPLASTY EAR;;  Surgeon: Stephan Crowley MD;  [facility]    MEATOPLASTY EAR Right 5/9/2019    Procedure: Right Ear Meatoplasty;  Surgeon: Stephan Crowley MD;  [facility]    MYRINGOTOMY, INSERT TUBE BILATERAL, COMBINED Bilateral 5/14/2015    Procedure: COMBINED MYRINGOTOMY, INSERT TUBE BILATERAL;  Surgeon: Stephan Crowley MD;  [facility]    PE TUBES      SEPTOPLASTY, TURBINOPLASTY, COMBINED N/A 9/23/2019    Procedure: Endoscopic Septoplasty, Bilateral Inferior Turbinate Reduction;  Surgeon: Stephan Crowley MD;  [facility]    TONSILLECTOMY      TYMPANOPLASTY Left 5/14/2015    Procedure: TYMPANOPLASTY;   Surgeon: Stephan Crowley MD;  [facility]    TYMPANOPLASTY Right 10/12/2017    Procedure: TYMPANOPLASTY;  Right Endural Flexiable Tympanoplasty, Middle Ear Reconstruction,Mastoidectomy, Myringotomy and Tube (#2 Paparella tube), Meatoplasty ;  Surgeon: Stephan Crowley MD;  [facility]    TYMPANOPLASTY Right 5/9/2019    Procedure: Right Revision Tympanoplasty Flexible Approach, Middle Ear Reconstruction, Myringotomy and Tube;  Surgeon: Stephan Crowley MD;  [facility]     HPI  Pleasant 44 year old female presents today as a(n) established patient for a recheck on her right PE tube and an ear cleaning prn. She was last seen on 7/31/24. She reports that her left PE tube fell out a while ago. She reports some recent aural fullness/pressure in her left ear, but attributes this to impacted cerumen.  She has a hx of a septoplasty with bilateral turbinoplasty on 9/23/2019.  She reports that she has been told that she may have sleep apnea but does not use a CPAP machine. She is open to getting a sleep study completed, and states that her nasal surgery helped with breathing while sleeping.    She denies facial pressure and pain, otorrhea, otalgia.    Review of Systems   Constitutional: Negative.    HENT:  Positive for hearing loss. Negative for ear discharge, ear pain and sinus pain.    Eyes: Negative.    Respiratory: Negative.     Gastrointestinal: Negative.    Skin: Negative.    Endo/Heme/Allergies: Negative.        Physical Exam  Vitals and nursing note reviewed.   Constitutional:       Appearance: Normal appearance.   HENT:      Head: Normocephalic and atraumatic.      Jaw: There is normal jaw occlusion.      Right Ear: Hearing and ear canal normal. No middle ear effusion. There is impacted cerumen. A PE tube is present. Tympanic membrane is scarred.      Left Ear: Hearing and ear canal normal.  No middle ear effusion. There is impacted cerumen. No PE tube. Tympanic membrane is scarred.      Nose:  No mucosal edema, congestion or rhinorrhea.      Right Nostril: No occlusion.      Left Nostril: No occlusion.      Right Turbinates: Not enlarged or swollen.      Left Turbinates: Not enlarged or swollen.      Right Sinus: No maxillary sinus tenderness or frontal sinus tenderness.      Left Sinus: No maxillary sinus tenderness or frontal sinus tenderness.      Mouth/Throat:      Mouth: Mucous membranes are moist.      Pharynx: Oropharynx is clear. Uvula midline.   Eyes:      Extraocular Movements: Extraocular movements intact.      Pupils: Pupils are equal, round, and reactive to light.   Neurological:      Mental Status: She is alert.       Ear wax removal: The patient was seen in the room. An informed consent was obtained from the patient. Her ears were evaluated under the microscope. Right ear canal was blocked 70% with ear wax that was suctioned and removed with an alligator. PE tube lumen was opened. The left ear canal was blocked 70% with ear wax that was suctioned with a 7 tip. He tolerated the procedure well and left the room no complications.    AUDIOGRAM: The patient underwent an audiogram today.  Right: Speech reception threshold is 10 dB with 100% word recognition.  Left: Speech reception threshold is 10 dB with 100% word recognition.    Hx: Multiple middle ear surgeries bilaterally.   Results: WNL to mild loss with conductive component right. Hearing WNL left. 100% word rec. bilaterally. DNT tymps due to surgical Hx    A/P  This pleasant patient has WNL to mild loss with conductive component in the right ear. Audiogram was independently reviewed and discussed in detail with the patient. The right PE tube is open and functioning properly. There are no acute sinus or ear infections present.     Follow up in clinic in 5 months or earlier prn.    Scribe/Staff:    Scribe Disclosure:   Margi BROWN, am serving as a scribe; to document services personally performed by Jordon Hou MD based on data  collection and the provider's statements to me.     Provider Disclosure:  I agree with above History, Review of Systems, Physical exam and Plan.  I have reviewed the content of the documentation and have edited it as needed. I have personally performed the services documented here and the documentation accurately represents those services and the decisions I have made.      Electronically signed by:  Jordon Hou MD

## 2025-04-09 NOTE — LETTER
4/9/2025      Elisha Nunez  [address]  [facility] [address]      Dear Colleague,    Thank you for referring your patient, Elisha Nunez, to the [facility]. Please see a copy of my visit note below.    No chief complaint on file.      PCP: Julia Beltran     Referring Provider: No ref. provider found    There were no vitals taken for this visit.    ENT Problem List:  Patient Active Problem List   Diagnosis     Elevated liver enzymes     Chronic suppurative otitis media of right ear     Pain in joint, lower leg     Abdominal pain, right lower quadrant     Cysts of both ovaries     Endometriosis determined by laparoscopy      Current Medications:  Current Outpatient Medications   Medication Sig Dispense Refill     busPIRone (BUSPAR) 15 MG tablet Take 1 tablet by mouth 2 times daily.       clonazePAM (KLONOPIN) 0.5 MG tablet Take 1 tablet by mouth 2 times daily.       etonogestrel-ethinyl estradiol (NUVARING) 0.12-0.015 MG/24HR vaginal ring Place 1 each vaginally every 28 days Place for 3 weeks and then remove for one week       HYDROcodone-acetaminophen (NORCO) 5-325 MG tablet Take 1-2 tablets by mouth every 4 hours as needed for moderate to severe pain. 30 tablet 0     hydrOXYzine (ATARAX) 25 MG tablet Take 25 mg by mouth 3 times daily as needed for itching       metFORMIN (GLUCOPHAGE) 500 MG tablet Take 1 tablet by mouth daily at 2 pm.       metoclopramide (REGLAN) 10 MG tablet Take 1 tablet (10 mg) by mouth 3 times daily as needed. 15 tablet 0     OLANZapine zydis (ZYPREXA) 5 MG ODT Take 1 tablet (5 mg) by mouth every 8 hours as needed for other (nausea and vomiting). 20 tablet 0     sertraline (ZOLOFT) 100 MG tablet Take 200 mg by mouth       WEGOVY 0.5 MG/0.5ML pen Inject 0.5 mg subcutaneously. (Patient not taking: Reported on 10/1/2024)       No current facility-administered medications for this visit.     Surgical History:  Past Surgical History:   Procedure Laterality  Date     ANTROSTOMY NASAL Bilateral 9/23/2019    Procedure: Bilateral Maxillary Antrostomy;  Surgeon: Stephan Crowley MD;  [facility]     CANALPLASTY Right 9/17/2018    Procedure: CANALPLASTY;;  Surgeon: Stephan Crowley MD;  [facility]     ETHMOIDECTOMY Bilateral 9/23/2019    Procedure: Bilateral Anterior Ethmoidectomy;  Surgeon: Stephan Crowley MD;  [facility]     GRAFT THIERSCH STATUS POST TYMPANOMASTOIDECTOMY Right 10/23/2017    Procedure: GRAFT THIERSCH STATUS POST TYMPANOMASTOIDECTOMY;  Right Removal of Granulation Tissue, Removal of Packing and Sutures, Thiersch Graft;  Surgeon: Stephan Crowley MD;  [facility]     GRAFT THIERSCH STATUS POST TYMPANOMASTOIDECTOMY Right 9/17/2018    Procedure: GRAFT THIERSCH STATUS POST TYMPANOMASTOIDECTOMY;  Right Ear Removal Of Granulation Tissue,  Canalplasty,   Meatoplasty,   Right Thiersch Graft,  Removal of Right Ear Existing Pressure Equalization Tube,  Myringotomy with placement of Paparella #2 Pressure Equalization Tube,  Myringoplasty  ;  Surgeon: Stephan Crowley MD;  [facility]     LAPAROSCOPIC APPENDECTOMY N/A 9/11/2024    Procedure: APPENDECTOMY, LAPAROSCOPIC;  Surgeon: Carlitos Box MD;  [facility]     LAPAROSCOPY DIAGNOSTIC (GYN)  9/11/2024    Procedure: Exploratory Laparoscopy with uterine and peritoneal biopsies;  Surgeon: Jean Stringer MD;  [facility]     MASTOIDECTOMY Right 10/12/2017    Procedure: MASTOIDECTOMY;;  Surgeon: Stephan Crowley MD;  [facility]     MEATOPLASTY EAR Right 10/12/2017    Procedure: MEATOPLASTY EAR;;  Surgeon: Stephan Crowley MD;  [facility]     MEATOPLASTY EAR Right 5/9/2019    Procedure: Right Ear Meatoplasty;  Surgeon: Stephan Crowley MD;  [facility]     MYRINGOTOMY, INSERT TUBE BILATERAL, COMBINED Bilateral 5/14/2015    Procedure: COMBINED MYRINGOTOMY, INSERT TUBE BILATERAL;  Surgeon: Stephan Crowley MD;  [facility]     PE TUBES        SEPTOPLASTY, TURBINOPLASTY, COMBINED N/A 9/23/2019    Procedure: Endoscopic Septoplasty, Bilateral Inferior Turbinate Reduction;  Surgeon: Stephan Crowley MD;  [facility]     TONSILLECTOMY       TYMPANOPLASTY Left 5/14/2015    Procedure: TYMPANOPLASTY;  Surgeon: Stephan Crowley MD;  [facility]     TYMPANOPLASTY Right 10/12/2017    Procedure: TYMPANOPLASTY;  Right Endural Flexiable Tympanoplasty, Middle Ear Reconstruction,Mastoidectomy, Myringotomy and Tube (#2 Paparella tube), Meatoplasty ;  Surgeon: Stephan Crowley MD;  [facility]     TYMPANOPLASTY Right 5/9/2019    Procedure: Right Revision Tympanoplasty Flexible Approach, Middle Ear Reconstruction, Myringotomy and Tube;  Surgeon: Stephan Crowley MD;  [facility]     HPI  Pleasant 44 year old female presents today as a(n) established patient for a recheck on her right PE tube and an ear cleaning prn. She was last seen on 7/31/24. She reports that her left PE tube fell out a while ago. She reports some recent aural fullness/pressure in her left ear, but attributes this to impacted cerumen.  She has a hx of a septoplasty with bilateral turbinoplasty on 9/23/2019.  She reports that she has been told that she may have sleep apnea but does not use a CPAP machine. She is open to getting a sleep study completed, and states that her nasal surgery helped with breathing while sleeping.    She denies facial pressure and pain, otorrhea, otalgia.    Review of Systems   Constitutional: Negative.    HENT:  Positive for hearing loss. Negative for ear discharge, ear pain and sinus pain.    Eyes: Negative.    Respiratory: Negative.     Gastrointestinal: Negative.    Skin: Negative.    Endo/Heme/Allergies: Negative.        Physical Exam  Vitals and nursing note reviewed.   Constitutional:       Appearance: Normal appearance.   HENT:      Head: Normocephalic and atraumatic.      Jaw: There is normal jaw occlusion.      Right Ear: Hearing and ear  canal normal. No middle ear effusion. There is impacted cerumen. A PE tube is present. Tympanic membrane is scarred.      Left Ear: Hearing and ear canal normal.  No middle ear effusion. There is impacted cerumen. No PE tube. Tympanic membrane is scarred.      Nose: No mucosal edema, congestion or rhinorrhea.      Right Nostril: No occlusion.      Left Nostril: No occlusion.      Right Turbinates: Not enlarged or swollen.      Left Turbinates: Not enlarged or swollen.      Right Sinus: No maxillary sinus tenderness or frontal sinus tenderness.      Left Sinus: No maxillary sinus tenderness or frontal sinus tenderness.      Mouth/Throat:      Mouth: Mucous membranes are moist.      Pharynx: Oropharynx is clear. Uvula midline.   Eyes:      Extraocular Movements: Extraocular movements intact.      Pupils: Pupils are equal, round, and reactive to light.   Neurological:      Mental Status: She is alert.       Ear wax removal: The patient was seen in the room. An informed consent was obtained from the patient. Her ears were evaluated under the microscope. Right ear canal was blocked 70% with ear wax that was suctioned and removed with an alligator. PE tube lumen was opened. The left ear canal was blocked 70% with ear wax that was suctioned with a 7 tip. He tolerated the procedure well and left the room no complications.    AUDIOGRAM: The patient underwent an audiogram today.  Right: Speech reception threshold is 10 dB with 100% word recognition.  Left: Speech reception threshold is 10 dB with 100% word recognition.    Hx: Multiple middle ear surgeries bilaterally.   Results: WNL to mild loss with conductive component right. Hearing WNL left. 100% word rec. bilaterally. DNT tymps due to surgical Hx    A/P  This pleasant patient has WNL to mild loss with conductive component in the right ear. Audiogram was independently reviewed and discussed in detail with the patient. The right PE tube is open and functioning properly.  There are no acute sinus or ear infections present.     Follow up in clinic in 5 months or earlier prn.    Scribe/Staff:    Scribe Disclosure:   I, Margi Chava, am serving as a scribe; to document services personally performed by Jordon Hou MD based on data collection and the provider's statements to me.     Provider Disclosure:  I agree with above History, Review of Systems, Physical exam and Plan.  I have reviewed the content of the documentation and have edited it as needed. I have personally performed the services documented here and the documentation accurately represents those services and the decisions I have made.      Electronically signed by:  Jordon Hou MD         Again, thank you for allowing me to participate in the care of your patient.        Sincerely,        Jordon Hou MD    Electronically signed

## 2025-04-09 NOTE — NURSING NOTE
Elisha Nunez's chief complaint for this visit includes:  Chief Complaint   Patient presents with    Follow Up     Recheck right PE Tube, left already out? Ear cleaning if needed.      PCP: Julia Beltran    Referring Provider:  Referred Self, MD  No address on file    There were no vitals taken for this visit.

## 2025-04-09 NOTE — PROGRESS NOTES
AUDIOLOGY REPORT    SUMMARY: Audiology visit completed. See audiogram for results.    RECOMMENDATIONS: Follow-up with ENT.    Tk Doty  Doctor of Audiology  [place] License # [id number]     Order placed

## 2025-07-13 ENCOUNTER — HEALTH MAINTENANCE LETTER (OUTPATIENT)
Age: 45
End: 2025-07-13

## 2025-08-21 ENCOUNTER — MEDICAL CORRESPONDENCE (OUTPATIENT)
Dept: HEALTH INFORMATION MANAGEMENT | Facility: HOSPITAL | Age: 45
End: 2025-08-21

## (undated) DEVICE — DRAPE STERI APERATURE 51X33" 1030

## (undated) DEVICE — SPONGE COTTONOID 1/2X3" 80-1407

## (undated) DEVICE — DRSG COTTON BALL 6PK LCB62

## (undated) DEVICE — DRAPE STOCKINETTE 4" 8544

## (undated) DEVICE — DRSG TEGADERM 4X4 3/4" 1626W

## (undated) DEVICE — SPONGE SURGIFOAM 100 1974

## (undated) DEVICE — POSITIONER ARMBOARD FOAM 1PAIR LF FP-ARMB1

## (undated) DEVICE — LINEN TOWEL PACK X5 5464

## (undated) DEVICE — SU ETHILON 4-0 PS-2 18" BLACK 1667H

## (undated) DEVICE — DRAPE MICRO ZEISS MD 62"X40.5" OPMI 6 09-MK904

## (undated) DEVICE — PAD ARMBOARD FOAM EGGCRATE COVIDEN 3114367

## (undated) DEVICE — LABEL STERILE PREPRINTED FOR OR FRRH01-2M

## (undated) DEVICE — Device

## (undated) DEVICE — STRAP KNEE/BODY 31143004

## (undated) DEVICE — EVAC SYSTEM CLEAR FLOW SC082500

## (undated) DEVICE — BNDG KLING 3" 2232

## (undated) DEVICE — ESU GROUND PAD UNIVERSAL W/O CORD

## (undated) DEVICE — SOL WATER IRRIG 1000ML BOTTLE 2F7114

## (undated) DEVICE — DRSG KERLIX 4 1/2"X4YDS ROLL 6730

## (undated) DEVICE — DRSG NASOPORE FIRM 4CM 5400-020-004

## (undated) DEVICE — BONE WAX 2.5GM W31G

## (undated) DEVICE — GLOVE PROTEXIS W/NEU-THERA 8.0  2D73TE80

## (undated) DEVICE — ENDO TROCAR SHIELDED BLADED KII ADV FIX 05X100MM CFB03

## (undated) DEVICE — DRSG GAUZE 4X8"

## (undated) DEVICE — DECANTER TRANSFER DEVICE 2008S

## (undated) DEVICE — TRAY PREP DRY SKIN SCRUB 067

## (undated) DEVICE — PACKING NASAL 8CM W/O AIRWAY 440402

## (undated) DEVICE — SU DERMABOND ADVANCED .7ML DNX12

## (undated) DEVICE — GLOVE PROTEXIS W/NEU-THERA 7.5  2D73TE75

## (undated) DEVICE — PACK THIERSCH GRAFT SEN32BSRM2

## (undated) DEVICE — SU VICRYL 0 UR-6 27" J603H

## (undated) DEVICE — SUCTION MANIFOLD DORNOCH ULTRA CART UL-CL500

## (undated) DEVICE — ESU GROUND PAD ADULT W/CORD E7507

## (undated) DEVICE — NDL 27GA 1.25" 305136

## (undated) DEVICE — BLADE INFERIOR TURBINATE 2.9MMX11CM 1882940HR

## (undated) DEVICE — SOL NACL 0.9% IRRIG 1000ML BOTTLE 2F7124

## (undated) DEVICE — SU CHROMIC 4-0 P-3 18" 1654G

## (undated) DEVICE — ENDO POUCH UNIV RETRIEVAL SYSTEM INZII 10MM CD001

## (undated) DEVICE — SUCTION MANIFOLD NEPTUNE 2 SYS 1 PORT 702-025-000

## (undated) DEVICE — DRAPE MAYO STAND 23X54 8337

## (undated) DEVICE — PACK LAP LAVH CUSTOM SMA32LPMBG

## (undated) DEVICE — KIT PATIENT POSITIONING PIGAZZI LATEX FREE 40580

## (undated) DEVICE — SUCTION STRYKERFLOW II 250-070-500

## (undated) DEVICE — ENDO TROCAR FIRST ENTRY KII FIOS ADV FIX 05X100MM CFF03

## (undated) DEVICE — DRSG KERLIX FLUFFS X5

## (undated) DEVICE — DRSG ABDOMINAL PAD UNSTERILE 8X10" WND152764B

## (undated) DEVICE — EYE DRSG PAD OVAL

## (undated) DEVICE — BIN-UROLOGY / CYSTO BN47

## (undated) DEVICE — STPL POWERED ECHELON VASC 35MM PVE35A

## (undated) DEVICE — TUBE NASOGASTRIC 18FR 48" 2 LUMEN 8888266148

## (undated) DEVICE — DRSG VASELINE 1/2X72" 8884421600

## (undated) DEVICE — PACK BASIN SET UP SUTCNBSBBA

## (undated) DEVICE — TUBING SUCTION MEDI-VAC SOFT 3/16"X20' N520A

## (undated) DEVICE — ESU LIGASURE MARYLAND LAPAROSCOPIC SLR/DVDR 5MMX37CM LF1937

## (undated) DEVICE — SYR 20ML LL W/O NDL 302830

## (undated) DEVICE — TUBING INSUFFLATION INSUFFLATOR FILTER HIGH FLOW 031322-01

## (undated) DEVICE — BLADE CLIPPER SGL USE 9680

## (undated) DEVICE — SYR EAR BULB 3OZ 0035830

## (undated) DEVICE — SU VICRYL 3-0 PS-2 18" UND J497G

## (undated) DEVICE — NDL INSUFFLATION 13GA 120MM C2201

## (undated) DEVICE — GLOVE 8.5 PROTEXIS PI CLSC PF BD CUF STRL LF 12IN 2D72PL85X

## (undated) DEVICE — SU MONOCRYL 4-0 PS-2 18" UND Y496G

## (undated) DEVICE — CATH SELF CATH MALE 14FR 414

## (undated) DEVICE — DRSG GAUZE 4X4" 2187

## (undated) DEVICE — COVER LT HANDLE 2/PK 5160-2FG

## (undated) DEVICE — TUBE EAR PAPARELLA TYPE 2 1025045

## (undated) DEVICE — WATER STERILE 1000ML STERILE UROMATIC 2B-71-14

## (undated) DEVICE — DRSG DERMACARE (OWENS SILK) 3X8" 8886834100

## (undated) DEVICE — DRSG GAUZE 3X3"

## (undated) DEVICE — CANISTER SUCTION MEDI-VAC GUARDIAN 2000ML 90D 65651-220

## (undated) DEVICE — PUNCTURE CLOSURE DEVICE PMITCSG

## (undated) DEVICE — TONGUE DEPRESSOR STERILE 6023

## (undated) DEVICE — HEADREST FOAM 9" PINK

## (undated) DEVICE — ENDO TROCAR SLEEVE KII ADV FIXATION 05X100MM CFS02

## (undated) DEVICE — SYR 03ML LL W/O NDL 309657

## (undated) DEVICE — PREP CHLORAPREP 26ML TINTED HI-LITE ORANGE 930815

## (undated) DEVICE — DRSG HOLDER NASAL DALE 600

## (undated) DEVICE — GLOVE PROTEXIS W/NEU-THERA 6.0  2D73TE60

## (undated) DEVICE — DRSG NON ADHERING 3 X 8 TELFA 1238

## (undated) DEVICE — UTERINE MANIPULATOR HUMI KRONER 6003

## (undated) DEVICE — GLOVE PROTEXIS POWDER FREE 7.0 ORTHOPEDIC 2D73ET70

## (undated) DEVICE — SURGICEL ENDOSCOPIC APPLICATOR FOR ORC POWDER 3123SPEA

## (undated) DEVICE — SLEEVE SCD EXPRESS KNEE LENGTH MED 9529

## (undated) DEVICE — GLV 8.5 BIOGEL LATEX 30485-01

## (undated) DEVICE — GOWN XLG DISP 9545

## (undated) DEVICE — SOL NACL 0.9% INJ 1000ML BAG 2B1324X

## (undated) DEVICE — BLANKET BAIR HUGGER UPPER BODY 42268

## (undated) DEVICE — SURGICEL POWDER ABSORBABLE HEMOSTAT 3GM 3013SP

## (undated) DEVICE — POSITIONER HEAD DONUT FOAM 9" LF FP-HEAD9

## (undated) DEVICE — SYSTEM CLEARIFY VISUALIZATION 21-345

## (undated) DEVICE — SOL RINGERS LACTATED 250ML BAG 2B2322Q

## (undated) DEVICE — ESU CORD BIPOLAR GREEN 10-4000

## (undated) DEVICE — NDL SPINAL 25GA 3.5" QUINCKE 405180

## (undated) DEVICE — BLADE RAZOR DBL EDGE

## (undated) DEVICE — BLADE TRICUT 3.5MM M4 ROTATABLE

## (undated) DEVICE — ENDO TROCAR BLUNT TIP KII BALLOON 12X100MM C0R47

## (undated) RX ORDER — EPHEDRINE SULFATE 50 MG/ML
INJECTION, SOLUTION INTRAMUSCULAR; INTRAVENOUS; SUBCUTANEOUS
Status: DISPENSED
Start: 2019-05-09

## (undated) RX ORDER — LIDOCAINE HYDROCHLORIDE 20 MG/ML
INJECTION, SOLUTION EPIDURAL; INFILTRATION; INTRACAUDAL; PERINEURAL
Status: DISPENSED
Start: 2019-05-09

## (undated) RX ORDER — TRANEXAMIC ACID 100 MG/ML
INJECTION, SOLUTION INTRAVENOUS
Status: DISPENSED
Start: 2024-09-11

## (undated) RX ORDER — PROPOFOL 10 MG/ML
INJECTION, EMULSION INTRAVENOUS
Status: DISPENSED
Start: 2019-09-23

## (undated) RX ORDER — FENTANYL CITRATE 50 UG/ML
INJECTION, SOLUTION INTRAMUSCULAR; INTRAVENOUS
Status: DISPENSED
Start: 2017-10-23

## (undated) RX ORDER — CEFAZOLIN SODIUM 2 G/100ML
INJECTION, SOLUTION INTRAVENOUS
Status: DISPENSED
Start: 2018-09-17

## (undated) RX ORDER — CEFAZOLIN SODIUM IN 0.9 % NACL 3 G/100 ML
INTRAVENOUS SOLUTION, PIGGYBACK (ML) INTRAVENOUS
Status: DISPENSED
Start: 2019-09-23

## (undated) RX ORDER — NEOSTIGMINE METHYLSULFATE 1 MG/ML
VIAL (ML) INJECTION
Status: DISPENSED
Start: 2018-09-17

## (undated) RX ORDER — LIDOCAINE HYDROCHLORIDE 20 MG/ML
INJECTION, SOLUTION EPIDURAL; INFILTRATION; INTRACAUDAL; PERINEURAL
Status: DISPENSED
Start: 2017-10-12

## (undated) RX ORDER — PROPOFOL 10 MG/ML
INJECTION, EMULSION INTRAVENOUS
Status: DISPENSED
Start: 2017-10-12

## (undated) RX ORDER — CEFAZOLIN SODIUM 2 G/100ML
INJECTION, SOLUTION INTRAVENOUS
Status: DISPENSED
Start: 2019-05-09

## (undated) RX ORDER — DEXAMETHASONE SODIUM PHOSPHATE 10 MG/ML
INJECTION, SOLUTION INTRAMUSCULAR; INTRAVENOUS
Status: DISPENSED
Start: 2024-09-11

## (undated) RX ORDER — HYDROMORPHONE HYDROCHLORIDE 1 MG/ML
INJECTION, SOLUTION INTRAMUSCULAR; INTRAVENOUS; SUBCUTANEOUS
Status: DISPENSED
Start: 2017-10-12

## (undated) RX ORDER — CEFAZOLIN SODIUM IN 0.9 % NACL 3 G/100 ML
INTRAVENOUS SOLUTION, PIGGYBACK (ML) INTRAVENOUS
Status: DISPENSED
Start: 2019-05-09

## (undated) RX ORDER — PHENYLEPHRINE HCL IN 0.9% NACL 1 MG/10 ML
SYRINGE (ML) INTRAVENOUS
Status: DISPENSED
Start: 2018-09-17

## (undated) RX ORDER — OXYCODONE HYDROCHLORIDE 5 MG/1
TABLET ORAL
Status: DISPENSED
Start: 2017-10-12

## (undated) RX ORDER — FENTANYL CITRATE 50 UG/ML
INJECTION, SOLUTION INTRAMUSCULAR; INTRAVENOUS
Status: DISPENSED
Start: 2019-09-23

## (undated) RX ORDER — SCOLOPAMINE TRANSDERMAL SYSTEM 1 MG/1
PATCH, EXTENDED RELEASE TRANSDERMAL
Status: DISPENSED
Start: 2019-09-23

## (undated) RX ORDER — DEXAMETHASONE SODIUM PHOSPHATE 4 MG/ML
INJECTION, SOLUTION INTRA-ARTICULAR; INTRALESIONAL; INTRAMUSCULAR; INTRAVENOUS; SOFT TISSUE
Status: DISPENSED
Start: 2018-09-17

## (undated) RX ORDER — PROPOFOL 10 MG/ML
INJECTION, EMULSION INTRAVENOUS
Status: DISPENSED
Start: 2024-09-11

## (undated) RX ORDER — HYDROMORPHONE HYDROCHLORIDE 1 MG/ML
INJECTION, SOLUTION INTRAMUSCULAR; INTRAVENOUS; SUBCUTANEOUS
Status: DISPENSED
Start: 2018-09-17

## (undated) RX ORDER — FENTANYL CITRATE 50 UG/ML
INJECTION, SOLUTION INTRAMUSCULAR; INTRAVENOUS
Status: DISPENSED
Start: 2018-09-17

## (undated) RX ORDER — ONDANSETRON 2 MG/ML
INJECTION INTRAMUSCULAR; INTRAVENOUS
Status: DISPENSED
Start: 2024-09-11

## (undated) RX ORDER — OXYCODONE HYDROCHLORIDE 5 MG/1
TABLET ORAL
Status: DISPENSED
Start: 2018-09-17

## (undated) RX ORDER — SCOLOPAMINE TRANSDERMAL SYSTEM 1 MG/1
PATCH, EXTENDED RELEASE TRANSDERMAL
Status: DISPENSED
Start: 2017-10-12

## (undated) RX ORDER — HYDROMORPHONE HYDROCHLORIDE 1 MG/ML
INJECTION, SOLUTION INTRAMUSCULAR; INTRAVENOUS; SUBCUTANEOUS
Status: DISPENSED
Start: 2019-09-23

## (undated) RX ORDER — CEFAZOLIN SODIUM 1 G/3ML
INJECTION, POWDER, FOR SOLUTION INTRAMUSCULAR; INTRAVENOUS
Status: DISPENSED
Start: 2018-09-17

## (undated) RX ORDER — DEXMEDETOMIDINE HYDROCHLORIDE 4 UG/ML
INJECTION, SOLUTION INTRAVENOUS
Status: DISPENSED
Start: 2024-09-11

## (undated) RX ORDER — HYDROMORPHONE HYDROCHLORIDE 1 MG/ML
INJECTION, SOLUTION INTRAMUSCULAR; INTRAVENOUS; SUBCUTANEOUS
Status: DISPENSED
Start: 2017-10-23

## (undated) RX ORDER — OXYMETAZOLINE HYDROCHLORIDE 0.05 G/100ML
SPRAY NASAL
Status: DISPENSED
Start: 2019-09-23

## (undated) RX ORDER — ONDANSETRON 2 MG/ML
INJECTION INTRAMUSCULAR; INTRAVENOUS
Status: DISPENSED
Start: 2019-09-23

## (undated) RX ORDER — EPHEDRINE SULFATE 50 MG/ML
INJECTION, SOLUTION INTRAMUSCULAR; INTRAVENOUS; SUBCUTANEOUS
Status: DISPENSED
Start: 2018-09-17

## (undated) RX ORDER — LIDOCAINE HYDROCHLORIDE 20 MG/ML
INJECTION, SOLUTION EPIDURAL; INFILTRATION; INTRACAUDAL; PERINEURAL
Status: DISPENSED
Start: 2017-10-23

## (undated) RX ORDER — SCOLOPAMINE TRANSDERMAL SYSTEM 1 MG/1
PATCH, EXTENDED RELEASE TRANSDERMAL
Status: DISPENSED
Start: 2019-05-09

## (undated) RX ORDER — ONDANSETRON 2 MG/ML
INJECTION INTRAMUSCULAR; INTRAVENOUS
Status: DISPENSED
Start: 2017-10-12

## (undated) RX ORDER — FENTANYL CITRATE 50 UG/ML
INJECTION, SOLUTION INTRAMUSCULAR; INTRAVENOUS
Status: DISPENSED
Start: 2024-09-11

## (undated) RX ORDER — FENTANYL CITRATE 50 UG/ML
INJECTION, SOLUTION INTRAMUSCULAR; INTRAVENOUS
Status: DISPENSED
Start: 2019-05-09

## (undated) RX ORDER — LIDOCAINE HYDROCHLORIDE 20 MG/ML
INJECTION, SOLUTION EPIDURAL; INFILTRATION; INTRACAUDAL; PERINEURAL
Status: DISPENSED
Start: 2019-09-23

## (undated) RX ORDER — ROCURONIUM BROMIDE 50 MG/5 ML
SYRINGE (ML) INTRAVENOUS
Status: DISPENSED
Start: 2017-10-23

## (undated) RX ORDER — CEFAZOLIN SODIUM 1 G/50ML
SOLUTION INTRAVENOUS
Status: DISPENSED
Start: 2017-10-12

## (undated) RX ORDER — GLYCOPYRROLATE 0.2 MG/ML
INJECTION, SOLUTION INTRAMUSCULAR; INTRAVENOUS
Status: DISPENSED
Start: 2018-09-17

## (undated) RX ORDER — FENTANYL CITRATE 50 UG/ML
INJECTION, SOLUTION INTRAMUSCULAR; INTRAVENOUS
Status: DISPENSED
Start: 2017-10-12

## (undated) RX ORDER — HYDROMORPHONE HYDROCHLORIDE 1 MG/ML
INJECTION, SOLUTION INTRAMUSCULAR; INTRAVENOUS; SUBCUTANEOUS
Status: DISPENSED
Start: 2024-09-11

## (undated) RX ORDER — LABETALOL 20 MG/4 ML (5 MG/ML) INTRAVENOUS SYRINGE
Status: DISPENSED
Start: 2019-09-23

## (undated) RX ORDER — EPHEDRINE SULFATE 50 MG/ML
INJECTION, SOLUTION INTRAMUSCULAR; INTRAVENOUS; SUBCUTANEOUS
Status: DISPENSED
Start: 2017-10-12

## (undated) RX ORDER — HYDROCODONE BITARTRATE AND ACETAMINOPHEN 5; 325 MG/1; MG/1
TABLET ORAL
Status: DISPENSED
Start: 2019-05-09

## (undated) RX ORDER — BUPIVACAINE HYDROCHLORIDE AND EPINEPHRINE 5; 5 MG/ML; UG/ML
INJECTION, SOLUTION EPIDURAL; INTRACAUDAL; PERINEURAL
Status: DISPENSED
Start: 2017-10-12

## (undated) RX ORDER — CEFAZOLIN SODIUM 1 G/50ML
SOLUTION INTRAVENOUS
Status: DISPENSED
Start: 2017-10-23

## (undated) RX ORDER — OXYCODONE AND ACETAMINOPHEN 5; 325 MG/1; MG/1
TABLET ORAL
Status: DISPENSED
Start: 2019-09-23

## (undated) RX ORDER — SCOLOPAMINE TRANSDERMAL SYSTEM 1 MG/1
PATCH, EXTENDED RELEASE TRANSDERMAL
Status: DISPENSED
Start: 2017-10-23

## (undated) RX ORDER — OXYCODONE HYDROCHLORIDE 5 MG/1
TABLET ORAL
Status: DISPENSED
Start: 2017-10-23

## (undated) RX ORDER — SCOLOPAMINE TRANSDERMAL SYSTEM 1 MG/1
PATCH, EXTENDED RELEASE TRANSDERMAL
Status: DISPENSED
Start: 2018-09-17

## (undated) RX ORDER — CEFAZOLIN SODIUM 2 G/100ML
INJECTION, SOLUTION INTRAVENOUS
Status: DISPENSED
Start: 2017-10-12

## (undated) RX ORDER — BACITRACIN ZINC 500 [USP'U]/G
OINTMENT TOPICAL
Status: DISPENSED
Start: 2017-10-12

## (undated) RX ORDER — SODIUM CHLORIDE, SODIUM LACTATE, POTASSIUM CHLORIDE, CALCIUM CHLORIDE 600; 310; 30; 20 MG/100ML; MG/100ML; MG/100ML; MG/100ML
INJECTION, SOLUTION INTRAVENOUS
Status: DISPENSED
Start: 2019-05-09

## (undated) RX ORDER — GLYCOPYRROLATE 0.2 MG/ML
INJECTION, SOLUTION INTRAMUSCULAR; INTRAVENOUS
Status: DISPENSED
Start: 2017-10-12

## (undated) RX ORDER — ACETAMINOPHEN 325 MG/1
TABLET ORAL
Status: DISPENSED
Start: 2019-09-23

## (undated) RX ORDER — PROPOFOL 10 MG/ML
INJECTION, EMULSION INTRAVENOUS
Status: DISPENSED
Start: 2019-05-09

## (undated) RX ORDER — DEXAMETHASONE SODIUM PHOSPHATE 4 MG/ML
INJECTION, SOLUTION INTRA-ARTICULAR; INTRALESIONAL; INTRAMUSCULAR; INTRAVENOUS; SOFT TISSUE
Status: DISPENSED
Start: 2019-09-23

## (undated) RX ORDER — PROPOFOL 10 MG/ML
INJECTION, EMULSION INTRAVENOUS
Status: DISPENSED
Start: 2018-09-17

## (undated) RX ORDER — ONDANSETRON 2 MG/ML
INJECTION INTRAMUSCULAR; INTRAVENOUS
Status: DISPENSED
Start: 2018-09-17

## (undated) RX ORDER — PROPOFOL 10 MG/ML
INJECTION, EMULSION INTRAVENOUS
Status: DISPENSED
Start: 2017-10-23

## (undated) RX ORDER — ACETAMINOPHEN 500 MG
TABLET ORAL
Status: DISPENSED
Start: 2018-09-17

## (undated) RX ORDER — GABAPENTIN 300 MG/1
CAPSULE ORAL
Status: DISPENSED
Start: 2019-09-23

## (undated) RX ORDER — ONDANSETRON 2 MG/ML
INJECTION INTRAMUSCULAR; INTRAVENOUS
Status: DISPENSED
Start: 2019-05-09

## (undated) RX ORDER — PHENYLEPHRINE HCL IN 0.9% NACL 1 MG/10 ML
SYRINGE (ML) INTRAVENOUS
Status: DISPENSED
Start: 2017-10-12